# Patient Record
Sex: MALE | Race: WHITE | NOT HISPANIC OR LATINO | Employment: FULL TIME | URBAN - METROPOLITAN AREA
[De-identification: names, ages, dates, MRNs, and addresses within clinical notes are randomized per-mention and may not be internally consistent; named-entity substitution may affect disease eponyms.]

---

## 2017-09-06 PROBLEM — K62.5 RECTAL BLEEDING: Status: ACTIVE | Noted: 2017-09-06

## 2018-06-06 ENCOUNTER — TELEPHONE (OUTPATIENT)
Dept: FAMILY MEDICINE | Facility: CLINIC | Age: 43
End: 2018-06-06

## 2018-06-06 DIAGNOSIS — Z13.1 DIABETES MELLITUS SCREENING: ICD-10-CM

## 2018-06-06 DIAGNOSIS — Z13.29 THYROID DISORDER SCREEN: ICD-10-CM

## 2018-06-06 DIAGNOSIS — Z13.0 SCREENING FOR DEFICIENCY ANEMIA: Primary | ICD-10-CM

## 2018-06-06 DIAGNOSIS — Z13.220 SCREENING CHOLESTEROL LEVEL: ICD-10-CM

## 2018-06-06 NOTE — TELEPHONE ENCOUNTER
----- Message from Minna Vela sent at 6/6/2018 10:05 AM CDT -----  Contact: Self/ [phone]  New patient would like to establish care with you and would like a physical.    Please call and advise.

## 2018-06-06 NOTE — TELEPHONE ENCOUNTER
OK - advise patient that I like my patients to get their fasting labs for screening PRIOR to wellness visit so that way we have the results to review during the visit - schedule patient for fasting labs and NEW Patient WELLNESS APPT

## 2018-06-12 ENCOUNTER — LAB VISIT (OUTPATIENT)
Dept: LAB | Facility: HOSPITAL | Age: 43
End: 2018-06-12
Attending: NURSE PRACTITIONER
Payer: COMMERCIAL

## 2018-06-12 DIAGNOSIS — Z13.0 SCREENING FOR DEFICIENCY ANEMIA: ICD-10-CM

## 2018-06-12 DIAGNOSIS — Z13.1 DIABETES MELLITUS SCREENING: ICD-10-CM

## 2018-06-12 DIAGNOSIS — Z13.29 THYROID DISORDER SCREEN: ICD-10-CM

## 2018-06-12 DIAGNOSIS — Z13.220 SCREENING CHOLESTEROL LEVEL: ICD-10-CM

## 2018-06-12 LAB
ALBUMIN SERPL BCP-MCNC: 3.7 G/DL
ALP SERPL-CCNC: 67 U/L
ALT SERPL W/O P-5'-P-CCNC: 29 U/L
ANION GAP SERPL CALC-SCNC: 10 MMOL/L
AST SERPL-CCNC: 20 U/L
BASOPHILS # BLD AUTO: 0.04 K/UL
BASOPHILS NFR BLD: 0.7 %
BILIRUB SERPL-MCNC: 0.7 MG/DL
BUN SERPL-MCNC: 12 MG/DL
CALCIUM SERPL-MCNC: 9.3 MG/DL
CHLORIDE SERPL-SCNC: 105 MMOL/L
CHOLEST SERPL-MCNC: 134 MG/DL
CHOLEST/HDLC SERPL: 4.5 {RATIO}
CO2 SERPL-SCNC: 23 MMOL/L
CREAT SERPL-MCNC: 1 MG/DL
DIFFERENTIAL METHOD: ABNORMAL
EOSINOPHIL # BLD AUTO: 0.2 K/UL
EOSINOPHIL NFR BLD: 3.4 %
ERYTHROCYTE [DISTWIDTH] IN BLOOD BY AUTOMATED COUNT: 15.8 %
EST. GFR  (AFRICAN AMERICAN): >60 ML/MIN/1.73 M^2
EST. GFR  (NON AFRICAN AMERICAN): >60 ML/MIN/1.73 M^2
GLUCOSE SERPL-MCNC: 86 MG/DL
HCT VFR BLD AUTO: 43.3 %
HDLC SERPL-MCNC: 30 MG/DL
HDLC SERPL: 22.4 %
HGB BLD-MCNC: 12.8 G/DL
LDLC SERPL CALC-MCNC: 64.2 MG/DL
LYMPHOCYTES # BLD AUTO: 1.4 K/UL
LYMPHOCYTES NFR BLD: 25.1 %
MCH RBC QN AUTO: 24.2 PG
MCHC RBC AUTO-ENTMCNC: 29.6 G/DL
MCV RBC AUTO: 82 FL
MONOCYTES # BLD AUTO: 0.8 K/UL
MONOCYTES NFR BLD: 13.7 %
NEUTROPHILS # BLD AUTO: 3.2 K/UL
NEUTROPHILS NFR BLD: 56.2 %
NONHDLC SERPL-MCNC: 104 MG/DL
PLATELET # BLD AUTO: 173 K/UL
PMV BLD AUTO: 10.4 FL
POTASSIUM SERPL-SCNC: 4.3 MMOL/L
PROT SERPL-MCNC: 6.9 G/DL
RBC # BLD AUTO: 5.3 M/UL
SODIUM SERPL-SCNC: 138 MMOL/L
TRIGL SERPL-MCNC: 199 MG/DL
TSH SERPL DL<=0.005 MIU/L-ACNC: 1.98 UIU/ML
WBC # BLD AUTO: 5.62 K/UL

## 2018-06-12 PROCEDURE — 80061 LIPID PANEL: CPT

## 2018-06-12 PROCEDURE — 84443 ASSAY THYROID STIM HORMONE: CPT

## 2018-06-12 PROCEDURE — 36415 COLL VENOUS BLD VENIPUNCTURE: CPT

## 2018-06-12 PROCEDURE — 80053 COMPREHEN METABOLIC PANEL: CPT

## 2018-06-12 PROCEDURE — 85025 COMPLETE CBC W/AUTO DIFF WBC: CPT

## 2018-06-13 ENCOUNTER — OFFICE VISIT (OUTPATIENT)
Dept: FAMILY MEDICINE | Facility: CLINIC | Age: 43
End: 2018-06-13
Payer: COMMERCIAL

## 2018-06-13 VITALS
SYSTOLIC BLOOD PRESSURE: 128 MMHG | DIASTOLIC BLOOD PRESSURE: 86 MMHG | HEART RATE: 87 BPM | WEIGHT: 296.5 LBS | BODY MASS INDEX: 42.45 KG/M2 | TEMPERATURE: 99 F | HEIGHT: 70 IN | OXYGEN SATURATION: 97 %

## 2018-06-13 DIAGNOSIS — E78.1 HYPERTRIGLYCERIDEMIA: ICD-10-CM

## 2018-06-13 DIAGNOSIS — L81.9 HYPOPIGMENTED SKIN LESION: ICD-10-CM

## 2018-06-13 DIAGNOSIS — K62.5 RECTAL BLEEDING: ICD-10-CM

## 2018-06-13 DIAGNOSIS — Z86.010 HISTORY OF COLON POLYPS: ICD-10-CM

## 2018-06-13 DIAGNOSIS — Z00.00 ANNUAL PHYSICAL EXAM: Primary | ICD-10-CM

## 2018-06-13 DIAGNOSIS — K64.8 INTERNAL HEMORRHOID: ICD-10-CM

## 2018-06-13 DIAGNOSIS — Z23 NEED FOR TDAP VACCINATION: ICD-10-CM

## 2018-06-13 DIAGNOSIS — D64.9 MILD ANEMIA: ICD-10-CM

## 2018-06-13 DIAGNOSIS — E78.6 LOW HDL (UNDER 40): ICD-10-CM

## 2018-06-13 DIAGNOSIS — E66.01 MORBID OBESITY WITH BMI OF 40.0-44.9, ADULT: ICD-10-CM

## 2018-06-13 PROBLEM — K63.5 COLON POLYPS: Status: ACTIVE | Noted: 2017-01-01

## 2018-06-13 PROCEDURE — 99386 PREV VISIT NEW AGE 40-64: CPT | Mod: 25,S$GLB,, | Performed by: NURSE PRACTITIONER

## 2018-06-13 PROCEDURE — 99999 PR PBB SHADOW E&M-EST. PATIENT-LVL IV: CPT | Mod: PBBFAC,,, | Performed by: NURSE PRACTITIONER

## 2018-06-13 PROCEDURE — 90715 TDAP VACCINE 7 YRS/> IM: CPT | Mod: S$GLB,,, | Performed by: NURSE PRACTITIONER

## 2018-06-13 PROCEDURE — 3079F DIAST BP 80-89 MM HG: CPT | Mod: CPTII,S$GLB,, | Performed by: NURSE PRACTITIONER

## 2018-06-13 PROCEDURE — 3074F SYST BP LT 130 MM HG: CPT | Mod: CPTII,S$GLB,, | Performed by: NURSE PRACTITIONER

## 2018-06-13 PROCEDURE — 90471 IMMUNIZATION ADMIN: CPT | Mod: S$GLB,,, | Performed by: NURSE PRACTITIONER

## 2018-06-13 NOTE — PATIENT INSTRUCTIONS
"  Triglycerides  Does this test have other names?  Lipid panel, fasting lipoprotein panel  What is this test?  This test measures the amount of triglycerides in your blood.  Triglycerides are one of several types of fats in your blood. Other kinds are LDL ("bad") cholesterol and HDL ("good") cholesterol.  Knowing your triglyceride level is important, especially if you have diabetes, are overweight or a smoker, or are mostly inactive. High triglyceride levels may put you at greater risk for a heart attack or stroke.    This test is part of a group of cholesterol and blood fat tests called a fasting lipoprotein panel, or lipid panel. This panel is recommended for all adults at least once every 5 years, or as recommended by your healthcare provider.  Knowing your triglyceride level helps your healthcare provider suggest healthy changes to your diet or lifestyle. If you have triglycerides that are high to very high, your provider is more likely to prescribe medicines to lower your triglycerides or your LDL cholesterol.  Why do I need this test?  You may have this test as part of a routine checkup. You may also need this test if you're overweight, drink too much alcohol, rarely exercise, or have other conditions like high blood pressure or diabetes.  If you are on cholesterol-lowering medicines, you may have this test to see how well your treatment is working.  What other tests might I have along with this test?  Your healthcare provider will order screening tests for LDL, HDL, and total cholesterol.  What do my test results mean?  Many things may affect your lab test results. These include the method each lab uses to do the test. Even if your test results are different from the normal value, you may not have a problem. To learn what the results mean for you, talk with your healthcare provider.  Results are given in milligrams per deciliter (mg/dL). Normal levels of triglycerides are less than 150 mg/dL.  Here are how " higher numbers are classified:  · 150 to 199 mg/dL: Borderline high  · 200 to 499 mg/dL: High  · 500 mg/dL and above: Very high  If you have a high triglyceride level, you have a greater risk for heart attack and stroke.  A triglyceride level above 150 mg/dL also means that you may have an increased risk for metabolic syndrome. This is a cluster of symptoms including high blood pressure, high blood sugar, and high body fat around the waist. These symptoms have been linked to increased risk for diabetes, heart disease, and stroke.  High triglycerides levels can also be caused by certain diseases or inherited conditions.  If your triglyceride level is above 200 mg/dL, your healthcare provider may recommend that you:  · Lose weight  · Limit high-fat foods containing saturated fats. These are animal fats found in meat, butter, and whole milk.  · Limit trans fats, which are found in many processed foods like chips and store-bought cookies  · Cut back on drinks with added sugars, such as soda  · Limit your alcohol intake  · Stop smoking  · Control your blood pressure  · Exercise for at least 30 minutes a day, 5 days a week  · Limit the calories from fat in your diet to 25% to 35% of your total intake  If your triglycerides are extremely high--above 500 mg/dL--you may have an added risk for problems with your pancreas. You will likely need medicine to lower your levels along with recommended changes in diet and lifestyle.  How is this test done?  The test requires a blood sample, which is drawn through a needle from a vein in your arm.  Does this test pose any risks?  Taking a blood sample with a needle carries risks that include bleeding, infection, bruising, or feeling dizzy. When the needle pricks your arm, you may feel a slight stinging sensation or pain. Afterward, the site may be slightly sore.  What might affect my test results?  Not fasting for the required length of time before the test can affect your results.  Certain medicines can affect your results, as can drinking alcohol.  How do I prepare for the test?  If you have this test as part of a cholesterol screening, you will need to not eat or drink anything but water for 9 to 14 hours before the test. In addition, be sure your healthcare provider knows about all medicines, herbs, vitamins, and supplements you are taking. This includes medicines that don't need a prescription and any illicit drugs you may use.     © 7383-5493 [employer]. [address] [place] [address], [place] [address]. All rights reserved. This information is not intended as a substitute for professional medical care. Always follow your healthcare professional's instructions.

## 2018-06-14 NOTE — PROGRESS NOTES
"Subjective:       Patient ID: Kyle Abel is a 43 y.o. male.    Chief Complaint: Annual Exam (wellness)    ####NEW PATIENT - 3 years since last visit###    Patient is a 43 year old white male with history of Hypertension that resolved with lifestyle modifications, history of rectal bleeding with internal hemorrhoids and colon polyps seen on colonoscopy that is here today to establish care and get Biometric Screening form completed.    Patient reports that he was diagnosed with Hypertension at one visit in the past because blood pressure was elevated but reports he was able to resolve with lifestyle modifications.  Blood pressure is okay today.  /86 (BP Location: Left arm, Patient Position: Sitting, BP Method: Large (Manual))   Pulse 87   Temp 99.4 °F (37.4 °C) (Oral)   Ht 5' 10" (1.778 m)   Wt 134.5 kg (296 lb 8.3 oz)   SpO2 97%   BMI 42.55 kg/m²     Patient reports he still has excessive rectal bleeding and known internal hemorrhoids.  He reports he seen GI and had colonoscopy screening but then the GI never addressed the continued rectal bleeding.  Patient does show a very mild microcytic anemia on wellness labs.  Will refer to Colorectal Surgery for evaluation.    Wellness labs:  -  CBC shows mild microcytic anemia present.  Does report rectal bleeding.  Will refer to CRS and advised to take MVI with iron supplement.  -  CMP WNL  -  Total cholesterol normal at 134 with normal LDL 64.2.  Patient does have elevated triglycerides of 199 with low HDL 30.  Patient did quit smoking 3 days ago.  Advised on lifestyle modifications and fish oil supplement.  -  TSH WNL    Health Maintenance:  -  Due for Tdap vaccine.        Lab Visit on 06/12/2018   Component Date Value Ref Range Status    WBC 06/12/2018 5.62  3.90 - 12.70 K/uL Final    RBC 06/12/2018 5.30  4.60 - 6.20 M/uL Final    Hemoglobin 06/12/2018 12.8* 14.0 - 18.0 g/dL Final    Hematocrit 06/12/2018 43.3  40.0 - 54.0 % Final    MCV " 06/12/2018 82  82 - 98 fL Final    MCH 06/12/2018 24.2* 27.0 - 31.0 pg Final    MCHC 06/12/2018 29.6* 32.0 - 36.0 g/dL Final    RDW 06/12/2018 15.8* 11.5 - 14.5 % Final    Platelets 06/12/2018 173  150 - 350 K/uL Final    MPV 06/12/2018 10.4  9.2 - 12.9 fL Final    Gran # (ANC) 06/12/2018 3.2  1.8 - 7.7 K/uL Final    Lymph # 06/12/2018 1.4  1.0 - 4.8 K/uL Final    Mono # 06/12/2018 0.8  0.3 - 1.0 K/uL Final    Eos # 06/12/2018 0.2  0.0 - 0.5 K/uL Final    Baso # 06/12/2018 0.04  0.00 - 0.20 K/uL Final    Gran% 06/12/2018 56.2  38.0 - 73.0 % Final    Lymph% 06/12/2018 25.1  18.0 - 48.0 % Final    Mono% 06/12/2018 13.7  4.0 - 15.0 % Final    Eosinophil% 06/12/2018 3.4  0.0 - 8.0 % Final    Basophil% 06/12/2018 0.7  0.0 - 1.9 % Final    Differential Method 06/12/2018 Automated   Final    Sodium 06/12/2018 138  136 - 145 mmol/L Final    Potassium 06/12/2018 4.3  3.5 - 5.1 mmol/L Final    Chloride 06/12/2018 105  95 - 110 mmol/L Final    CO2 06/12/2018 23  23 - 29 mmol/L Final    Glucose 06/12/2018 86  70 - 110 mg/dL Final    BUN, Bld 06/12/2018 12  6 - 20 mg/dL Final    Creatinine 06/12/2018 1.0  0.5 - 1.4 mg/dL Final    Calcium 06/12/2018 9.3  8.7 - 10.5 mg/dL Final    Total Protein 06/12/2018 6.9  6.0 - 8.4 g/dL Final    Albumin 06/12/2018 3.7  3.5 - 5.2 g/dL Final    Total Bilirubin 06/12/2018 0.7  0.1 - 1.0 mg/dL Final    Comment: For infants and newborns, interpretation of results should be based  on gestational age, weight and in agreement with clinical  observations.  Premature Infant recommended reference ranges:  Up to 24 hours.............<8.0 mg/dL  Up to 48 hours............<12.0 mg/dL  3-5 days..................<15.0 mg/dL  6-29 days.................<15.0 mg/dL      Alkaline Phosphatase 06/12/2018 67  55 - 135 U/L Final    AST 06/12/2018 20  10 - 40 U/L Final    ALT 06/12/2018 29  10 - 44 U/L Final    Anion Gap 06/12/2018 10  8 - 16 mmol/L Final    eGFR if   06/12/2018 >60  >60 mL/min/1.73 m^2 Final    eGFR if non African American 06/12/2018 >60  >60 mL/min/1.73 m^2 Final    Comment: Calculation used to obtain the estimated glomerular filtration  rate (eGFR) is the CKD-EPI equation.       Cholesterol 06/12/2018 134  120 - 199 mg/dL Final    Comment: The National Cholesterol Education Program (NCEP) has set the  following guidelines (reference ranges) for Cholesterol:  Optimal.....................<200 mg/dL  Borderline High.............200-239 mg/dL  High........................> or = 240 mg/dL      Triglycerides 06/12/2018 199* 30 - 150 mg/dL Final    Comment: The National Cholesterol Education Program (NCEP) has set the  following guidelines (reference values) for triglycerides:  Normal......................<150 mg/dL  Borderline High.............150-199 mg/dL  High........................200-499 mg/dL      HDL 06/12/2018 30* 40 - 75 mg/dL Final    Comment: The National Cholesterol Education Program (NCEP) has set the  following guidelines (reference values) for HDL Cholesterol:  Low...............<40 mg/dL  Optimal...........>60 mg/dL      LDL Cholesterol 06/12/2018 64.2  63.0 - 159.0 mg/dL Final    Comment: The National Cholesterol Education Program (NCEP) has set the  following guidelines (reference values) for LDL Cholesterol:  Optimal.......................<130 mg/dL  Borderline High...............130-159 mg/dL  High..........................160-189 mg/dL  Very High.....................>190 mg/dL      HDL/Chol Ratio 06/12/2018 22.4  20.0 - 50.0 % Final    Total Cholesterol/HDL Ratio 06/12/2018 4.5  2.0 - 5.0 Final    Non-HDL Cholesterol 06/12/2018 104  mg/dL Final    Comment: Risk category and Non-HDL cholesterol goals:  Coronary heart disease (CHD)or equivalent (10-year risk of CHD >20%):  Non-HDL cholesterol goal     <130 mg/dL  Two or more CHD risk factors and 10-year risk of CHD <= 20%:  Non-HDL cholesterol goal     <160 mg/dL  0 to 1 CHD risk  factor:  Non-HDL cholesterol goal     <190 mg/dL      TSH 06/12/2018 1.978  0.400 - 4.000 uIU/mL Final       Previous Medications    No medications on file       Past Medical History:   Diagnosis Date    Colon polyps 2017    repeat colonoscopy in 5 years    Hemorrhoid     Hypertension     Diagnosed @ 1 1/2 years ago and medication was prescribed - patient reports he did not take medication and does not want treatment for blood pressure    Internal hemorrhoid, bleeding     Rectal bleed        Past Surgical History:   Procedure Laterality Date    COLONOSCOPY N/A 9/6/2017    Procedure: COLONOSCOPY;  Surgeon: Chapin Hernandez MD;  [facility];  Service: Endoscopy;  Laterality: N/A;       Family History   Problem Relation Age of Onset    No Known Problems Mother     Ulcerative colitis Father 57        intestines     No Known Problems Brother        Social History     Social History    Marital status:      Spouse name: N/A    Number of children: N/A    Years of education: N/A     Occupational History     [employer]      Social History Main Topics    Smoking status: Former Smoker     Packs/day: 1.50     Years: 25.00     Types: Cigarettes     Quit date: 6/10/2018    Smokeless tobacco: Current User     Types: Chew    Alcohol use Yes      Comment: every weekend - 4 drinks either beer or vodka per occasion    Drug use: No    Sexual activity: Not Asked     Other Topics Concern    None     Social History Narrative    None       Review of Systems   Constitutional: Negative for activity change, appetite change, fatigue, fever and unexpected weight change.        Obesity   HENT: Negative for congestion, ear pain, mouth sores, nosebleeds, postnasal drip, rhinorrhea, sinus pressure, sneezing, sore throat, trouble swallowing and voice change.    Eyes: Negative.    Respiratory: Negative for cough, chest tightness and shortness of breath.    Cardiovascular: Negative for  "chest pain, palpitations and leg swelling.   Gastrointestinal: Positive for anal bleeding, blood in stool and rectal pain. Negative for abdominal pain, constipation, diarrhea, nausea and vomiting.   Endocrine: Negative.    Genitourinary: Negative for difficulty urinating, dysuria, flank pain, hematuria and urgency.   Musculoskeletal: Negative for arthralgias, back pain, gait problem, joint swelling, myalgias and neck pain.   Skin: Negative for color change, rash and wound.        Skin lesion on back that he would like checked.   Allergic/Immunologic: Negative for immunocompromised state.   Neurological: Negative for dizziness, tremors, seizures, syncope, speech difficulty and headaches.   Hematological: Negative for adenopathy. Does not bruise/bleed easily.   Psychiatric/Behavioral: Negative for behavioral problems, dysphoric mood, sleep disturbance and suicidal ideas. The patient is not nervous/anxious.          Objective:     Vitals:    06/13/18 1445   BP: 128/86   BP Location: Left arm   Patient Position: Sitting   BP Method: Large (Manual)   Pulse: 87   Temp: 99.4 °F (37.4 °C)   TempSrc: Oral   SpO2: 97%   Weight: 134.5 kg (296 lb 8.3 oz)   Height: 5' 10" (1.778 m)          Physical Exam   Constitutional: He is oriented to person, place, and time. He appears well-developed and well-nourished.   + morbid obesity with Body mass index is 42.55 kg/m².     HENT:   Head: Normocephalic.   Right Ear: External ear normal.   Left Ear: External ear normal.   Nose: Nose normal.   Mouth/Throat: Oropharynx is clear and moist. No oropharyngeal exudate.   Eyes: EOM are normal. Pupils are equal, round, and reactive to light. Right eye exhibits no discharge. Left eye exhibits no discharge. No scleral icterus.   Neck: Normal range of motion. Neck supple. No tracheal deviation present. No thyromegaly present.   Cardiovascular: Normal rate, regular rhythm and normal heart sounds.    No murmur heard.  Pulmonary/Chest: Effort normal " and breath sounds normal. No respiratory distress.   Abdominal: Soft. He exhibits no distension.   Musculoskeletal: Normal range of motion. He exhibits no edema.   Lymphadenopathy:     He has no cervical adenopathy.   Neurological: He is alert and oriented to person, place, and time. Coordination normal.   Skin: Skin is warm and dry. No rash noted.        Patient has a hypopigmented, raised lesion to the back that is round with normal borders and < 1 cm - not highly suspicious.  However patient has multiple scarring to bilateral arms that patient reports he has his own cautery machine that he uses to remove skin lesions - his description of lesions sounds like acetic keratosis.  Recommended patient see dermatologist for skin check.   Psychiatric: He has a normal mood and affect. His behavior is normal.         Assessment:         ICD-10-CM ICD-9-CM   1. Annual physical exam Z00.00 V70.0   2. Mild anemia D64.9 285.9   3. Rectal bleeding K62.5 569.3   4. Internal hemorrhoid K64.8 455.0   5. History of colon polyps Z86.010 V12.72   6. Hypertriglyceridemia E78.1 272.1   7. Low HDL (under 40) E78.6 272.5   8. Need for Tdap vaccination Z23 V06.1   9. Hypopigmented skin lesion L81.9 709.00   10. Morbid obesity with BMI of 40.0-44.9, adult E66.01 278.01    Z68.41 V85.41       Plan:       Annual physical exam  - due for Tdap vaccine    Mild anemia  -  Suspect due to the rectal bleeding that patient describes.  Advised to take OTC MVI with iron and will refer to CRS  -     Ambulatory Referral to Colorectal Surgery    Rectal bleeding  -     Ambulatory Referral to Colorectal Surgery    Internal hemorrhoid  -     Ambulatory Referral to Colorectal Surgery    History of colon polyps  -     Ambulatory Referral to Colorectal Surgery    Hypertriglyceridemia  -  Advised on lifestyle modifications with multiple handouts given    Low HDL (under 40)  -  Advised on lifestyle modifications and multiple handouts given.  Also recommended  fish oil supplement.    Need for Tdap vaccination  -     Tdap Vaccine    Hypopigmented skin lesion  -  Advised patient that skin lesion on back is not highly suspicious BUT the lesions he describes that he is removed himself by cautery sounds suspicious of precancerous lesions - recommended patient see dermatology for skin check.    Morbid obesity with BMI of 40.0-44.9, adult  -  Advised on lifestyle modifications.      Follow-up in about 1 year (around 6/13/2019).     Patient's Medications   New Prescriptions    No medications on file   Previous Medications    No medications on file   Modified Medications    No medications on file   Discontinued Medications    HYDROCORTISONE-PRAMOXINE (PROCTOFOAM-HS) RECTAL FOAM    Place 1 applicator rectally 2 (two) times daily.

## 2018-06-18 NOTE — PROGRESS NOTES
CRS Office Visit History and Physical    [place]erring [place]  [address] [facility]  [address]  [place] [address]    SUBJECTIVE:     Chief Complaint: internal hemorrhoids    History of Present Illness:  Patient is a 43 y.o. male presents with internal hemorrhoids. The patient is a new patient to this practice.   Course is as follows:  He has suffered from internal and external hemorrhoids for multiple years.  His external hemorrhoids of decreased in size over the past few years which she attributes to a topical remedy.  He still has frequent bleeding with bowel movements.  This is associated with passage of clots.  He has intermittent low dull pain when bleeding. No significant perianal mass or protrusion.  No prolapse of tissue. His bowel movements are regular and he does have intermittent constipation.  He tries to moderate this with dietary intake of fiber.  No family history of colon or rectal cancer.  No prior anorectal procedures.        Last Colonoscopy: 2017    - One 8 mm polyp in the ascending colon, removed                         with a hot snare. Resected and retrieved. --> pathology was adenoma                        - One 4 mm polyp in the sigmoid colon, removed with                         a cold snare. Resected and retrieved.  --> hyperplastic                        - Internal hemorrhoids.                        - The examination was otherwise normal.    Review of patient's allergies indicates:   Allergen Reactions    Penicillins Swelling     As a child age 11       Past Medical History:   Diagnosis Date    Colon polyps 2017    repeat colonoscopy in 5 years    Hemorrhoid     Hypertension     Diagnosed @ 1 1/2 years ago and medication was prescribed - patient reports he did not take medication and does not want treatment for blood pressure    Internal hemorrhoid, bleeding     Rectal bleed      Past Surgical History:   Procedure Laterality Date    COLONOSCOPY N/A 9/6/2017     "Procedure: COLONOSCOPY;  Surgeon: Chapin Hernandez MD;  Location: [facility];  Service: Endoscopy;  Laterality: N/A;     Family History   Problem Relation Age of Onset    No Known Problems Mother     Ulcerative colitis Father 57        intestines     No Known Problems Brother      Social History   Substance Use Topics    Smoking status: Former Smoker     Packs/day: 1.50     Years: 25.00     Types: Cigarettes     Quit date: 6/10/2018    Smokeless tobacco: Current User     Types: Chew    Alcohol use Yes      Comment: every weekend - 4 drinks either beer or vodka per occasion        Review of Systems:  Review of Systems   Constitutional: Negative for chills, diaphoresis, fever, malaise/fatigue and weight loss.   HENT: Negative for congestion.    Respiratory: Negative for shortness of breath.    Cardiovascular: Negative for chest pain and leg swelling.   Gastrointestinal: Positive for blood in stool and constipation. Negative for abdominal pain, nausea and vomiting.   Genitourinary: Negative for dysuria.   Musculoskeletal: Negative for back pain and myalgias.   Skin: Negative for rash.   Neurological: Negative for dizziness and weakness.   Endo/Heme/Allergies: Does not bruise/bleed easily.   Psychiatric/Behavioral: Negative for depression.       OBJECTIVE:     Vital Signs (Most Recent)  /84 (BP Location: Left arm, Patient Position: Sitting, BP Method: Large (Manual))   Pulse 86   Ht 5' 10" (1.778 m)   Wt 135.6 kg (299 lb 0.9 oz)   SpO2 97%   BMI 42.91 kg/m²     Physical Exam:  General: White male in no distress   Neuro: alert and oriented x 4.  Moves all extremities.     HEENT: no icterus.  Trachea midline  Respiratory: respirations are even and unlabored  Cardiac: regular rate  Abdomen:  Soft, nontender, no masses  Extremities: Warm dry and intact  Skin: no rashes  Anorectal:  External exam reveals very small external skin tags.  Digital exam was performed. A moderate size hemorrhoids felt on the " right posterior.  Otherwise perianal tone is normal and the exam was normal.  Anoscopy was then performed. Grade 2-3 right posterior hemorrhoid.  Grade 1 right anterior hemorrhoid.  Grade 1-2 left lateral hemorrhoid    Labs: H/H = 13/43    Imaging: none      ASSESSMENT/PLAN:     Kyle was seen today for hemorrhoids and rectal bleeding.    Diagnoses and all orders for this visit:    Internal hemorrhoid, bleeding    Other orders  -     traMADol (ULTRAM) 50 mg tablet; Take 1 tablet (50 mg total) by mouth every 6 (six) hours as needed for Pain.        43-year-old gentleman with rectal bleeding associated to a large posterior right hemorrhoid and smaller left lateral hemorrhoid.  Options for treatment were discussed with him today including dietary modification versus banding versus excisional hemorrhoidectomy.  Banding was recommended as the disease is mostly internal.  Banding was performed in the left lateral and right posterior location.  I will plan to see him back in 4 weeks.    Rubber Band Ligation:  Verbal consent was obtained.   Clear plastic anoscope inserted.    Grade II/III hemorrhoids seen on the right posterior position and grade I/II in the left lateral position  Suction applicator was placed above the dentate line.   Rubber bands were deployed in the right posterior and left lateral position.    Patient tolerated the procedure well.       CECILIA Lawrence MD  Staff Surgeon  Colon & Rectal Surgery

## 2018-06-19 ENCOUNTER — INITIAL CONSULT (OUTPATIENT)
Dept: SURGERY | Facility: CLINIC | Age: 43
End: 2018-06-19
Payer: COMMERCIAL

## 2018-06-19 VITALS
DIASTOLIC BLOOD PRESSURE: 84 MMHG | HEIGHT: 70 IN | HEART RATE: 86 BPM | OXYGEN SATURATION: 97 % | WEIGHT: 299.06 LBS | SYSTOLIC BLOOD PRESSURE: 130 MMHG | BODY MASS INDEX: 42.81 KG/M2

## 2018-06-19 DIAGNOSIS — K64.8 INTERNAL HEMORRHOID, BLEEDING: Primary | ICD-10-CM

## 2018-06-19 PROCEDURE — 3075F SYST BP GE 130 - 139MM HG: CPT | Mod: CPTII,S$GLB,, | Performed by: COLON & RECTAL SURGERY

## 2018-06-19 PROCEDURE — 99203 OFFICE O/P NEW LOW 30 MIN: CPT | Mod: 25,S$GLB,, | Performed by: COLON & RECTAL SURGERY

## 2018-06-19 PROCEDURE — 46221 LIGATION OF HEMORRHOID(S): CPT | Mod: S$GLB,,, | Performed by: COLON & RECTAL SURGERY

## 2018-06-19 PROCEDURE — 3008F BODY MASS INDEX DOCD: CPT | Mod: CPTII,S$GLB,, | Performed by: COLON & RECTAL SURGERY

## 2018-06-19 PROCEDURE — 3079F DIAST BP 80-89 MM HG: CPT | Mod: CPTII,S$GLB,, | Performed by: COLON & RECTAL SURGERY

## 2018-06-19 PROCEDURE — 99999 PR PBB SHADOW E&M-EST. PATIENT-LVL III: CPT | Mod: PBBFAC,,, | Performed by: COLON & RECTAL SURGERY

## 2018-06-19 RX ORDER — TRAMADOL HYDROCHLORIDE 50 MG/1
50 TABLET ORAL EVERY 6 HOURS PRN
Qty: 30 TABLET | Refills: 0 | Status: SHIPPED | OUTPATIENT
Start: 2018-06-19 | End: 2018-06-29

## 2018-06-19 NOTE — LETTER
June 19, 2018      [place]  [address] [facility]  [address]  [place] [address]           [facility]  [address]  [place] [address]  Phone: [phone]  Fax: [phone]          Patient: Kyle Abel   MR Number: [id number]   YOB: 1975   Date of Visit: 6/19/2018       Dear Gilma Cuba:    Thank you for referring Kyle Abel to me for evaluation. Attached you will find relevant portions of my assessment and plan of care.    If you have questions, please do not hesitate to call me. I look forward to following Kyle Abel along with you.    Sincerely,    Bal Lawrence MD    Enclosure  CC:  No Recipients    If you would like to receive this communication electronically, please contact [email or web address] or [phone] to request more information on [facility] access.    For providers and/or their staff who would like to refer a patient to [facility], please contact us through our one-stop-shop provider referral line, [facility], at [phone].    If you feel you have received this communication in error or would no longer like to receive these types of communications, please e-mail [email or web address]

## 2018-06-20 ENCOUNTER — TELEPHONE (OUTPATIENT)
Dept: SURGERY | Facility: CLINIC | Age: 43
End: 2018-06-20

## 2018-06-20 ENCOUNTER — NURSE TRIAGE (OUTPATIENT)
Dept: ADMINISTRATIVE | Facility: CLINIC | Age: 43
End: 2018-06-20

## 2018-06-20 NOTE — TELEPHONE ENCOUNTER
----- Message from Connie Cagle sent at 6/20/2018  9:01 AM CDT -----  Contact: Pt:[phone]  .Needs Advice    Reason for call:Pt called and states he came in to see  06/19/18 and he is experiencing lower back pain and leg pain.Pt would like to speak with the nurse.      Communication Preference:Pt:[phone]  Additional Information:

## 2018-06-20 NOTE — TELEPHONE ENCOUNTER
Pt says the Tramadol did not help last night but today he took Naproxin which did help. The pain is leg and low back pain. Told him this is not related to the RBL. If it continues tomorrow to call his primary doctor.

## 2018-06-20 NOTE — TELEPHONE ENCOUNTER
Reason for Disposition   [1] MILD-MODERATE post-op pain (e.g., pain scale 1-7) AND [2] not controlled with pain medications    Protocols used: ST POST-OP SYMPTOMS AND [id number]    Kyle is calling to report that since procedure he is having lower back pain and pain in left leg.  States Tramadol does not help this pain.  Pain is a 7.  Please contact Kyle to advise this AM.  Kyle can be reached at [phone]

## 2018-10-29 ENCOUNTER — TELEPHONE (OUTPATIENT)
Dept: FAMILY MEDICINE | Facility: CLINIC | Age: 43
End: 2018-10-29

## 2018-10-29 NOTE — TELEPHONE ENCOUNTER
Spoke with pt has an ear infection don't have the time to come in could you call something in ,Gilma called something in before for me, if not to call him and will try to get in.

## 2018-10-29 NOTE — TELEPHONE ENCOUNTER
Call patient and dvise patient that I can not call in med to treat symptoms that I have not evaluated.  An ear infection can be outer ear, middle ear, or inner ear infection - treat all 3 of these conditions differently so  and  must evaluate the ear to know how to treat.

## 2018-10-29 NOTE — TELEPHONE ENCOUNTER
----- Message from Yasmeen Draper sent at 10/29/2018 12:01 PM CDT -----  Contact: [phone]/ self   Patient called in requesting to speak with you. Patient prefers to speak with a nurse. Please advise.

## 2019-01-03 ENCOUNTER — TELEPHONE (OUTPATIENT)
Dept: FAMILY MEDICINE | Facility: CLINIC | Age: 44
End: 2019-01-03

## 2019-01-03 ENCOUNTER — OFFICE VISIT (OUTPATIENT)
Dept: FAMILY MEDICINE | Facility: CLINIC | Age: 44
End: 2019-01-03
Payer: COMMERCIAL

## 2019-01-03 VITALS
HEIGHT: 70 IN | BODY MASS INDEX: 41.95 KG/M2 | HEART RATE: 82 BPM | SYSTOLIC BLOOD PRESSURE: 124 MMHG | OXYGEN SATURATION: 96 % | TEMPERATURE: 99 F | WEIGHT: 293 LBS | DIASTOLIC BLOOD PRESSURE: 80 MMHG

## 2019-01-03 DIAGNOSIS — J30.9 ALLERGIC RHINITIS, UNSPECIFIED SEASONALITY, UNSPECIFIED TRIGGER: Primary | ICD-10-CM

## 2019-01-03 DIAGNOSIS — L81.9 HYPOPIGMENTED SKIN LESION: ICD-10-CM

## 2019-01-03 DIAGNOSIS — Z87.19 HISTORY OF HEMORRHOIDS: ICD-10-CM

## 2019-01-03 DIAGNOSIS — K62.5 RECTAL BLEEDING: ICD-10-CM

## 2019-01-03 PROCEDURE — 3079F PR MOST RECENT DIASTOLIC BLOOD PRESSURE 80-89 MM HG: ICD-10-PCS | Mod: CPTII,S$GLB,, | Performed by: NURSE PRACTITIONER

## 2019-01-03 PROCEDURE — 99999 PR PBB SHADOW E&M-EST. PATIENT-LVL III: CPT | Mod: PBBFAC,,, | Performed by: NURSE PRACTITIONER

## 2019-01-03 PROCEDURE — 3074F SYST BP LT 130 MM HG: CPT | Mod: CPTII,S$GLB,, | Performed by: NURSE PRACTITIONER

## 2019-01-03 PROCEDURE — 99214 PR OFFICE/OUTPT VISIT, EST, LEVL IV, 30-39 MIN: ICD-10-PCS | Mod: S$GLB,,, | Performed by: NURSE PRACTITIONER

## 2019-01-03 PROCEDURE — 99214 OFFICE O/P EST MOD 30 MIN: CPT | Mod: S$GLB,,, | Performed by: NURSE PRACTITIONER

## 2019-01-03 PROCEDURE — 3074F PR MOST RECENT SYSTOLIC BLOOD PRESSURE < 130 MM HG: ICD-10-PCS | Mod: CPTII,S$GLB,, | Performed by: NURSE PRACTITIONER

## 2019-01-03 PROCEDURE — 3008F BODY MASS INDEX DOCD: CPT | Mod: CPTII,S$GLB,, | Performed by: NURSE PRACTITIONER

## 2019-01-03 PROCEDURE — 99999 PR PBB SHADOW E&M-EST. PATIENT-LVL III: ICD-10-PCS | Mod: PBBFAC,,, | Performed by: NURSE PRACTITIONER

## 2019-01-03 PROCEDURE — 3008F PR BODY MASS INDEX (BMI) DOCUMENTED: ICD-10-PCS | Mod: CPTII,S$GLB,, | Performed by: NURSE PRACTITIONER

## 2019-01-03 PROCEDURE — 3079F DIAST BP 80-89 MM HG: CPT | Mod: CPTII,S$GLB,, | Performed by: NURSE PRACTITIONER

## 2019-01-03 RX ORDER — CETIRIZINE HYDROCHLORIDE 10 MG/1
10 TABLET ORAL DAILY
Refills: 0 | COMMUNITY
Start: 2019-01-03 | End: 2019-01-15

## 2019-01-03 NOTE — PROGRESS NOTES
Subjective:       Patient ID: Kyle Abel is a 43 y.o. male.    Chief Complaint: Otalgia and Sinus Problem    44 y/o male present to clinic with ear ache, sinus problems, and rectal bleeding.  This patient is new to me, his PCP is Gilma Cuba NP. Ear ache and sinus problems began about 5 days ago. He has not tried any medication for symptom relief. He believes he has an ear infection as he has a hx of chronic ear infections.    Patient has noticed rectal bleeding with bowel movements for the past month. Previous rubber band ligation procedure for internal hemorrhoids with Dr. Lawrence in June 2018. He also notices small blood clots with BM.   He has intermittent low dull pain when bleeding. No significant perianal mass or protrusion.  No prolapse of tissue. His bowel movements are regular and he does have intermittent constipation.  He tries to moderate this with dietary intake of fiber.  No family history of colon or rectal cancer.      Otalgia    There is pain in both ears. This is a new problem. The current episode started in the past 7 days (5 days ago). There has been no fever. The pain is at a severity of 5/10. The pain is moderate. Associated symptoms include coughing, headaches and rhinorrhea. Pertinent negatives include no abdominal pain, diarrhea, ear discharge, hearing loss, neck pain, sore throat or vomiting. He has tried nothing for the symptoms. His past medical history is significant for a chronic ear infection.   Sinus Problem   This is a new problem. The current episode started in the past 7 days (5 days ago). The problem has been gradually worsening since onset. There has been no fever. His pain is at a severity of 5/10. The pain is moderate. Associated symptoms include congestion, coughing, ear pain, headaches, sinus pressure and sneezing. Pertinent negatives include no chills, neck pain, shortness of breath, sore throat or swollen glands. Past treatments include nothing.       Current  Outpatient Medications   Medication Sig Dispense Refill    cetirizine (ZYRTEC) 10 MG tablet Take 1 tablet (10 mg total) by mouth once daily.  0     No current facility-administered medications for this visit.        Past Medical History:   Diagnosis Date    Colon polyps 2017    repeat colonoscopy in 5 years    Hemorrhoid     Hypertension     Diagnosed @ 1 1/2 years ago and medication was prescribed - patient reports he did not take medication and does not want treatment for blood pressure    Internal hemorrhoid, bleeding     Rectal bleed        Past Surgical History:   Procedure Laterality Date    COLONOSCOPY N/A 2017    Performed by Chapin Hernandez MD at [facility]       Family History   Problem Relation Age of Onset    No Known Problems Mother     Ulcerative colitis Father 57        intestines     No Known Problems Brother        Social History     Socioeconomic History    Marital status:      Spouse name: None    Number of children: None    Years of education: None    Highest education level: None   Social Needs    Financial resource strain: None    Food insecurity - worry: None    Food insecurity - inability: None    Transportation needs - medical: None    Transportation needs - non-medical: None   Occupational History    Occupation:      Employer: [employer]    Tobacco Use    Smoking status: Former Smoker     Packs/day: 1.50     Years: 25.00     Pack years: 37.50     Types: Cigarettes     Last attempt to quit: 6/10/2018     Years since quittin.5    Smokeless tobacco: Current User     Types: Chew   Substance and Sexual Activity    Alcohol use: Yes     Comment: every weekend - 4 drinks either beer or vodka per occasion    Drug use: No    Sexual activity: None   Other Topics Concern    None   Social History Narrative    None       Review of Systems   Constitutional: Negative for chills, fatigue and fever.   HENT: Positive for congestion, ear  "pain, postnasal drip, rhinorrhea, sinus pressure, sinus pain and sneezing. Negative for ear discharge, hearing loss, sore throat and trouble swallowing.    Eyes: Positive for discharge (watery eyes). Negative for pain, redness and itching.   Respiratory: Positive for cough. Negative for shortness of breath and wheezing.    Cardiovascular: Negative for chest pain.   Gastrointestinal: Positive for anal bleeding, blood in stool, constipation and rectal pain. Negative for abdominal pain, diarrhea, nausea and vomiting.   Genitourinary: Negative for dysuria.   Musculoskeletal: Negative for myalgias and neck pain.   Allergic/Immunologic: Positive for environmental allergies.   Neurological: Positive for headaches. Negative for syncope and weakness.   Hematological: Does not bruise/bleed easily.         Objective:     Vitals:    01/03/19 1306   BP: 124/80   BP Location: Right arm   Patient Position: Sitting   BP Method: Large (Manual)   Pulse: 82   Temp: 98.6 °F (37 °C)   TempSrc: Oral   SpO2: 96%   Weight: 132.9 kg (292 lb 15.9 oz)   Height: 5' 10" (1.778 m)          Physical Exam   Constitutional: He is oriented to person, place, and time. He appears well-developed and well-nourished.   HENT:   Head: Normocephalic.   Right Ear: Hearing normal. Tympanic membrane is not erythematous. A middle ear effusion (clear fluid) is present.   Left Ear: Hearing and tympanic membrane normal. Tympanic membrane is not erythematous.   Nose: Mucosal edema and rhinorrhea present. Right sinus exhibits maxillary sinus tenderness. Right sinus exhibits no frontal sinus tenderness. Left sinus exhibits maxillary sinus tenderness. Left sinus exhibits no frontal sinus tenderness.   Mouth/Throat: Uvula is midline and mucous membranes are normal. Posterior oropharyngeal erythema (+pnd) present. No oropharyngeal exudate or posterior oropharyngeal edema.   Eyes: Conjunctivae and EOM are normal. Pupils are equal, round, and reactive to light.   Neck: " Normal range of motion. No thyromegaly present.   Cardiovascular: Normal rate and regular rhythm.   Pulmonary/Chest: Effort normal and breath sounds normal. He has no wheezes.   Abdominal: Soft. Bowel sounds are normal. There is no tenderness.   Musculoskeletal: Normal range of motion.   Lymphadenopathy:     He has no cervical adenopathy.   Neurological: He is alert and oriented to person, place, and time.   Skin: Skin is warm and dry.        Psychiatric: He has a normal mood and affect.         Assessment:         ICD-10-CM ICD-9-CM   1. Allergic rhinitis, unspecified seasonality, unspecified trigger J30.9 477.9   2. Rectal bleeding K62.5 569.3   3. History of hemorrhoids Z87.19 V13.89   4. Hypopigmented skin lesion L81.9 709.00       Plan:       Allergic rhinitis, unspecified seasonality, unspecified trigger  -     cetirizine (ZYRTEC) 10 MG tablet; Take 1 tablet (10 mg total) by mouth once daily.; Refill: 0  -continue nasal Flonase 1-2 sprays to each nostril daily    Rectal bleeding  -     Ambulatory Referral to Gastroenterology    History of hemorrhoids  -     Ambulatory Referral to Gastroenterology    Hypopigmented skin lesion  -Encouraged to see dermatology is lesion changes in size, color, or becomes painful or irritated.    Follow-up if symptoms worsen or fail to improve.        Medication List           Accurate as of 1/3/19  2:07 PM. If you have any questions, ask your nurse or doctor.               START taking these medications    cetirizine 10 MG tablet  Commonly known as:  ZYRTEC  Take 1 tablet (10 mg total) by mouth once daily.  Started by:  JOSEP Mendoza           Where to Get Your Medications      You can get these medications from any pharmacy    You don't need a prescription for these medications  · cetirizine 10 MG tablet

## 2019-01-03 NOTE — TELEPHONE ENCOUNTER
----- Message from Regine Willingham sent at 1/3/2019  8:56 AM CST -----  Contact: [phone]/ self   Pt its requesting an appointment for today , states she has double ear infection . Please advise

## 2019-01-04 ENCOUNTER — TELEPHONE (OUTPATIENT)
Dept: FAMILY MEDICINE | Facility: CLINIC | Age: 44
End: 2019-01-04

## 2019-01-04 DIAGNOSIS — K62.5 RECTAL BLEEDING: Primary | ICD-10-CM

## 2019-01-04 DIAGNOSIS — K64.8 INTERNAL HEMORRHOID, BLEEDING: ICD-10-CM

## 2019-01-04 NOTE — TELEPHONE ENCOUNTER
----- Message from Brianna Draper sent at 1/4/2019  4:42 PM CST -----  Patient would like to have a referral to Colon and Rectal and not GI. The patient stated he would like to see Dr. Lawrence or someone in his dept for his issue. Please advise.

## 2019-01-15 ENCOUNTER — OFFICE VISIT (OUTPATIENT)
Dept: SURGERY | Facility: CLINIC | Age: 44
End: 2019-01-15
Payer: COMMERCIAL

## 2019-01-15 VITALS
DIASTOLIC BLOOD PRESSURE: 91 MMHG | BODY MASS INDEX: 42.17 KG/M2 | SYSTOLIC BLOOD PRESSURE: 153 MMHG | HEART RATE: 96 BPM | WEIGHT: 294.56 LBS | HEIGHT: 70 IN

## 2019-01-15 DIAGNOSIS — K64.8 INTERNAL HEMORRHOID, BLEEDING: Primary | ICD-10-CM

## 2019-01-15 PROCEDURE — 3077F PR MOST RECENT SYSTOLIC BLOOD PRESSURE >= 140 MM HG: ICD-10-PCS | Mod: CPTII,S$GLB,, | Performed by: COLON & RECTAL SURGERY

## 2019-01-15 PROCEDURE — 46221 LIGATION OF HEMORRHOID(S): CPT | Mod: S$GLB,,, | Performed by: COLON & RECTAL SURGERY

## 2019-01-15 PROCEDURE — 3077F SYST BP >= 140 MM HG: CPT | Mod: CPTII,S$GLB,, | Performed by: COLON & RECTAL SURGERY

## 2019-01-15 PROCEDURE — 46221 PR HEMORRHOIDECTOMY INTERNAL RUBBER BAND LIGATIONS: ICD-10-PCS | Mod: S$GLB,,, | Performed by: COLON & RECTAL SURGERY

## 2019-01-15 PROCEDURE — 3008F PR BODY MASS INDEX (BMI) DOCUMENTED: ICD-10-PCS | Mod: CPTII,S$GLB,, | Performed by: COLON & RECTAL SURGERY

## 2019-01-15 PROCEDURE — 99999 PR PBB SHADOW E&M-EST. PATIENT-LVL III: CPT | Mod: PBBFAC,,, | Performed by: COLON & RECTAL SURGERY

## 2019-01-15 PROCEDURE — 99213 PR OFFICE/OUTPT VISIT, EST, LEVL III, 20-29 MIN: ICD-10-PCS | Mod: 25,S$GLB,, | Performed by: COLON & RECTAL SURGERY

## 2019-01-15 PROCEDURE — 99999 PR PBB SHADOW E&M-EST. PATIENT-LVL III: ICD-10-PCS | Mod: PBBFAC,,, | Performed by: COLON & RECTAL SURGERY

## 2019-01-15 PROCEDURE — 3080F PR MOST RECENT DIASTOLIC BLOOD PRESSURE >= 90 MM HG: ICD-10-PCS | Mod: CPTII,S$GLB,, | Performed by: COLON & RECTAL SURGERY

## 2019-01-15 PROCEDURE — 99213 OFFICE O/P EST LOW 20 MIN: CPT | Mod: 25,S$GLB,, | Performed by: COLON & RECTAL SURGERY

## 2019-01-15 PROCEDURE — 3008F BODY MASS INDEX DOCD: CPT | Mod: CPTII,S$GLB,, | Performed by: COLON & RECTAL SURGERY

## 2019-01-15 PROCEDURE — 3080F DIAST BP >= 90 MM HG: CPT | Mod: CPTII,S$GLB,, | Performed by: COLON & RECTAL SURGERY

## 2019-01-15 NOTE — LETTER
January 15, 2019      Loretta Bennett, JOSEP  [address]  [address]  [facility] [address]           [facility]  [address]  [place] [address]  Phone: [phone]          Patient: Kyle Abel   MR Number: [id number]   YOB: 1975   Date of Visit: 1/15/2019       Dear Loretta Bennett:    Thank you for referring Kyle Abel to me for evaluation. Attached you will find relevant portions of my assessment and plan of care.    If you have questions, please do not hesitate to call me. I look forward to following Kyle Abel along with you.    Sincerely,    Bal Lawrence MD    Enclosure  CC:  No Recipients    If you would like to receive this communication electronically, please contact [email or web address] or [phone] to request more information on [facility] Link access.    For providers and/or their staff who would like to refer a patient to [facility], please contact us through our one-stop-shop provider referral line, [facility] , at [phone].    If you feel you have received this communication in error or would no longer like to receive these types of communications, please e-mail [email or web address]

## 2019-01-15 NOTE — PROGRESS NOTES
CRS Office Visit Followup  Referring Md:   Loretta Bennett, [place]  [address]  [address]  [place] [address]    SUBJECTIVE:     Chief Complaint: internal hemorrhoids    History of Present Illness:  Patient is a 43 y.o. male presents with internal hemorrhoids. The patient is an established patient to this practice.   Course is as follows:  6/19/18:  He has suffered from internal and external hemorrhoids for multiple years.  His external hemorrhoids of decreased in size over the past few years which she attributes to a topical remedy.  He still has frequent bleeding with bowel movements.  This is associated with passage of clots.  He has intermittent low dull pain when bleeding. No significant perianal mass or protrusion.  No prolapse of tissue. His bowel movements are regular and he does have intermittent constipation.  He tries to moderate this with dietary intake of fiber.      - found to have Grade 2-3 right posterior hemorrhoid.  Grade 1 right anterior hemorrhoid.  Grade 1-2 left lateral hemorrhoid.  Banding was performed on right posterior and left lateral columns  1/15/19:  Did well for a few months and then had multiple episodes of repeat bleeding.  Still struggles with constipation.    No family history of colon or rectal cancer.  No prior anorectal procedures.        Last Colonoscopy: 2017    - One 8 mm polyp in the ascending colon, removed                         with a hot snare. Resected and retrieved. --> pathology was adenoma                        - One 4 mm polyp in the sigmoid colon, removed with                         a cold snare. Resected and retrieved.  --> hyperplastic                        - Internal hemorrhoids.                        - The examination was otherwise normal.      Review of Systems:  Review of Systems   Constitutional: Negative for chills, diaphoresis, fever, malaise/fatigue and weight loss.   HENT: Negative for congestion.    Respiratory: Negative for shortness of  "breath.    Cardiovascular: Negative for chest pain and leg swelling.   Gastrointestinal: Positive for blood in stool and constipation. Negative for abdominal pain, nausea and vomiting.   Genitourinary: Negative for dysuria.   Musculoskeletal: Negative for back pain and myalgias.   Skin: Negative for rash.   Neurological: Negative for dizziness and weakness.   Endo/Heme/Allergies: Does not bruise/bleed easily.   Psychiatric/Behavioral: Negative for depression.       OBJECTIVE:     Vital Signs (Most Recent)  BP (!) 153/91 (BP Location: Left arm, Patient Position: Sitting, BP Method: Large (Automatic))   Pulse 96   Ht 5' 10" (1.778 m)   Wt 133.6 kg (294 lb 8.6 oz)   BMI 42.26 kg/m²     Physical Exam:  General: White male in no distress   Neuro: alert and oriented x 4.  Moves all extremities.     HEENT: no icterus.  Trachea midline  Respiratory: respirations are even and unlabored  Cardiac: regular rate  Abdomen:  Soft, nontender, no masses  Extremities: Warm dry and intact  Skin: no rashes  Anorectal:  External exam reveals very small external skin tags.  Digital exam was performed. A moderate size hemorrhoids felt on the right posterior.  Otherwise perianal tone is normal and the exam was normal.  Anoscopy was then performed. Grade 2-3 right posterior hemorrhoid.  Grade 1=2 right anterior hemorrhoid.  Grade 1 left lateral hemorrhoid    Labs: H/H = 13/43    Imaging: none      ASSESSMENT/PLAN:     Diagnoses and all orders for this visit:    Internal hemorrhoid, bleeding        43-year-old gentleman with rectal bleeding associated to a large posterior right hemorrhoid and smaller left lateral hemorrhoid.  RBL on 6/2018 in the left lateral and right posterior positions.  Repeat banding was performed today on the right posterior and right anterior positions.  This was done in the left lateral position as he had back discomfort following prone positioning.  See him back in 4 weeks for evaluation for repeat " banding.    We discussed the need to take intermittent MiraLax to help with constipation.      Rubber Band Ligation:  Verbal consent was obtained.   Clear plastic anoscope inserted.    Grade II/III hemorrhoids seen on the right posterior position and grade I/II in the right anterior position  Suction applicator was placed above the dentate line.   Rubber bands were deployed in the right posterior and right anterior position.    Patient tolerated the procedure well.       CECILIA Lawrence MD  Staff Surgeon  Colon & Rectal Surgery

## 2019-01-20 ENCOUNTER — HOSPITAL ENCOUNTER (EMERGENCY)
Facility: HOSPITAL | Age: 44
Discharge: HOME | End: 2019-01-20
Attending: EMERGENCY MEDICINE
Payer: COMMERCIAL

## 2019-01-20 VITALS
BODY MASS INDEX: 40.89 KG/M2 | OXYGEN SATURATION: 99 % | SYSTOLIC BLOOD PRESSURE: 134 MMHG | RESPIRATION RATE: 18 BRPM | DIASTOLIC BLOOD PRESSURE: 84 MMHG | WEIGHT: 285 LBS | TEMPERATURE: 99 F | HEART RATE: 86 BPM

## 2019-01-20 DIAGNOSIS — K57.92 DIVERTICULITIS: ICD-10-CM

## 2019-01-20 DIAGNOSIS — K57.32 SIGMOID DIVERTICULITIS: Primary | ICD-10-CM

## 2019-01-20 DIAGNOSIS — R10.9 ABDOMINAL PAIN: ICD-10-CM

## 2019-01-20 LAB
ALBUMIN SERPL BCP-MCNC: 3.9 G/DL
ALP SERPL-CCNC: 67 U/L
ALT SERPL W/O P-5'-P-CCNC: 30 U/L
ANION GAP SERPL CALC-SCNC: 10 MMOL/L
AST SERPL-CCNC: 19 U/L
BASOPHILS # BLD AUTO: 0.01 K/UL
BASOPHILS NFR BLD: 0.1 %
BILIRUB SERPL-MCNC: 1.2 MG/DL
BILIRUB UR QL STRIP: NEGATIVE
BUN SERPL-MCNC: 10 MG/DL
CALCIUM SERPL-MCNC: 9.7 MG/DL
CHLORIDE SERPL-SCNC: 101 MMOL/L
CLARITY UR: CLEAR
CO2 SERPL-SCNC: 27 MMOL/L
COLOR UR: ABNORMAL
CREAT SERPL-MCNC: 1.1 MG/DL
DIFFERENTIAL METHOD: ABNORMAL
EOSINOPHIL # BLD AUTO: 0 K/UL
EOSINOPHIL NFR BLD: 0.3 %
ERYTHROCYTE [DISTWIDTH] IN BLOOD BY AUTOMATED COUNT: 14.5 %
EST. GFR  (AFRICAN AMERICAN): >60 ML/MIN/1.73 M^2
EST. GFR  (NON AFRICAN AMERICAN): >60 ML/MIN/1.73 M^2
GLUCOSE SERPL-MCNC: 110 MG/DL
GLUCOSE UR QL STRIP: NEGATIVE
HCT VFR BLD AUTO: 47.5 %
HGB BLD-MCNC: 15.3 G/DL
HGB UR QL STRIP: NEGATIVE
KETONES UR QL STRIP: NEGATIVE
LACTATE SERPL-SCNC: 1.7 MMOL/L
LEUKOCYTE ESTERASE UR QL STRIP: NEGATIVE
LIPASE SERPL-CCNC: 10 U/L
LYMPHOCYTES # BLD AUTO: 0.9 K/UL
LYMPHOCYTES NFR BLD: 8.4 %
MCH RBC QN AUTO: 28.3 PG
MCHC RBC AUTO-ENTMCNC: 32.2 G/DL
MCV RBC AUTO: 88 FL
MONOCYTES # BLD AUTO: 0.9 K/UL
MONOCYTES NFR BLD: 8.2 %
NEUTROPHILS # BLD AUTO: 9.1 K/UL
NEUTROPHILS NFR BLD: 82.6 %
NITRITE UR QL STRIP: NEGATIVE
PH UR STRIP: 8 [PH] (ref 5–8)
PLATELET # BLD AUTO: 160 K/UL
PMV BLD AUTO: 11 FL
POTASSIUM SERPL-SCNC: 3.9 MMOL/L
PROT SERPL-MCNC: 7.6 G/DL
PROT UR QL STRIP: ABNORMAL
RBC # BLD AUTO: 5.4 M/UL
SODIUM SERPL-SCNC: 138 MMOL/L
SP GR UR STRIP: 1.01 (ref 1–1.03)
URN SPEC COLLECT METH UR: ABNORMAL
UROBILINOGEN UR STRIP-ACNC: 1 EU/DL
WBC # BLD AUTO: 11.03 K/UL

## 2019-01-20 PROCEDURE — 25500020 PHARM REV CODE 255: Performed by: EMERGENCY MEDICINE

## 2019-01-20 PROCEDURE — 85025 COMPLETE CBC W/AUTO DIFF WBC: CPT

## 2019-01-20 PROCEDURE — 83690 ASSAY OF LIPASE: CPT

## 2019-01-20 PROCEDURE — 81003 URINALYSIS AUTO W/O SCOPE: CPT

## 2019-01-20 PROCEDURE — 96374 THER/PROPH/DIAG INJ IV PUSH: CPT

## 2019-01-20 PROCEDURE — 96361 HYDRATE IV INFUSION ADD-ON: CPT

## 2019-01-20 PROCEDURE — 63600175 PHARM REV CODE 636 W HCPCS: Performed by: EMERGENCY MEDICINE

## 2019-01-20 PROCEDURE — 96375 TX/PRO/DX INJ NEW DRUG ADDON: CPT

## 2019-01-20 PROCEDURE — 80053 COMPREHEN METABOLIC PANEL: CPT

## 2019-01-20 PROCEDURE — 99285 EMERGENCY DEPT VISIT HI MDM: CPT | Mod: 25

## 2019-01-20 PROCEDURE — 25000003 PHARM REV CODE 250: Performed by: EMERGENCY MEDICINE

## 2019-01-20 PROCEDURE — 83605 ASSAY OF LACTIC ACID: CPT

## 2019-01-20 PROCEDURE — 96376 TX/PRO/DX INJ SAME DRUG ADON: CPT

## 2019-01-20 RX ORDER — ONDANSETRON 2 MG/ML
4 INJECTION INTRAMUSCULAR; INTRAVENOUS
Status: COMPLETED | OUTPATIENT
Start: 2019-01-20 | End: 2019-01-20

## 2019-01-20 RX ORDER — HYOSCYAMINE SULFATE 0.5 MG/ML
0.5 INJECTION, SOLUTION SUBCUTANEOUS
Status: COMPLETED | OUTPATIENT
Start: 2019-01-20 | End: 2019-01-20

## 2019-01-20 RX ORDER — METRONIDAZOLE 500 MG/1
500 TABLET ORAL EVERY 8 HOURS
Status: DISCONTINUED | OUTPATIENT
Start: 2019-01-20 | End: 2019-01-21 | Stop reason: HOSPADM

## 2019-01-20 RX ORDER — CIPROFLOXACIN 500 MG/1
500 TABLET ORAL EVERY 12 HOURS
Status: DISCONTINUED | OUTPATIENT
Start: 2019-01-20 | End: 2019-01-21 | Stop reason: HOSPADM

## 2019-01-20 RX ORDER — MORPHINE SULFATE 4 MG/ML
4 INJECTION, SOLUTION INTRAMUSCULAR; INTRAVENOUS
Status: COMPLETED | OUTPATIENT
Start: 2019-01-20 | End: 2019-01-20

## 2019-01-20 RX ORDER — METRONIDAZOLE 500 MG/1
500 TABLET ORAL EVERY 6 HOURS
Qty: 28 TABLET | Refills: 0 | Status: ON HOLD | OUTPATIENT
Start: 2019-01-20 | End: 2019-02-06 | Stop reason: HOSPADM

## 2019-01-20 RX ORDER — ONDANSETRON 4 MG/1
8 TABLET, ORALLY DISINTEGRATING ORAL EVERY 8 HOURS PRN
Qty: 12 TABLET | Refills: 0 | Status: SHIPPED | OUTPATIENT
Start: 2019-01-20 | End: 2019-01-23

## 2019-01-20 RX ORDER — SYRING-NEEDL,DISP,INSUL,0.3 ML 29 G X1/2"
296 SYRINGE, EMPTY DISPOSABLE MISCELLANEOUS
Status: DISCONTINUED | OUTPATIENT
Start: 2019-01-20 | End: 2019-01-20

## 2019-01-20 RX ORDER — CIPROFLOXACIN 500 MG/1
500 TABLET ORAL EVERY 12 HOURS
Qty: 14 TABLET | Refills: 0 | Status: ON HOLD | OUTPATIENT
Start: 2019-01-20 | End: 2019-02-06 | Stop reason: HOSPADM

## 2019-01-20 RX ORDER — SODIUM CHLORIDE 9 MG/ML
1000 INJECTION, SOLUTION INTRAVENOUS
Status: COMPLETED | OUTPATIENT
Start: 2019-01-20 | End: 2019-01-20

## 2019-01-20 RX ORDER — PSEUDOEPHEDRINE/ACETAMINOPHEN 30MG-500MG
100 TABLET ORAL
Status: DISCONTINUED | OUTPATIENT
Start: 2019-01-20 | End: 2019-01-20

## 2019-01-20 RX ORDER — OXYCODONE AND ACETAMINOPHEN 5; 325 MG/1; MG/1
1 TABLET ORAL EVERY 6 HOURS PRN
Qty: 12 TABLET | Refills: 0 | Status: SHIPPED | OUTPATIENT
Start: 2019-01-20 | End: 2019-01-23

## 2019-01-20 RX ADMIN — MORPHINE SULFATE 4 MG: 4 INJECTION INTRAVENOUS at 08:01

## 2019-01-20 RX ADMIN — HYOSCYAMINE SULFATE 0.5 MG: 0.5 INJECTION, SOLUTION SUBCUTANEOUS at 08:01

## 2019-01-20 RX ADMIN — MORPHINE SULFATE 4 MG: 4 INJECTION INTRAVENOUS at 09:01

## 2019-01-20 RX ADMIN — CIPROFLOXACIN HYDROCHLORIDE 500 MG: 500 TABLET, FILM COATED ORAL at 10:01

## 2019-01-20 RX ADMIN — METRONIDAZOLE 500 MG: 500 TABLET ORAL at 10:01

## 2019-01-20 RX ADMIN — SODIUM CHLORIDE 1000 ML: 0.9 INJECTION, SOLUTION INTRAVENOUS at 07:01

## 2019-01-20 RX ADMIN — ONDANSETRON 4 MG: 2 INJECTION, SOLUTION INTRAMUSCULAR; INTRAVENOUS at 09:01

## 2019-01-20 RX ADMIN — IOHEXOL 100 ML: 350 INJECTION, SOLUTION INTRAVENOUS at 09:01

## 2019-01-21 NOTE — DISCHARGE INSTRUCTIONS
Take all medications as prescribed.  Follow up with your PCP in one week.  Return to the ED if symptoms fail to improve.  Utilize a bland diet over then next week.

## 2019-01-21 NOTE — ED PROVIDER NOTES
Encounter Date: 1/20/2019    SCRIBE #1 NOTE: I, Isabel Mejia, am scribing for, and in the presence of,  Dr. Smith. I have scribed the entire note.       History     Chief Complaint   Patient presents with    Abdominal Pain     states constipation took oral laxatives and suppository had bm and continues with abdominal pain      This is a 43 y.o. male who presents with complaint of abdominal pain. The patient describes the pain as being located in the left lower quadrant without radiation of side pain.  He reports onset of symptoms was about 2-3 days ago. The patient states prior to onset of symptoms he was having constipation. He then tried using laxatives and suppositories for relief.  At this time, the patient believes that he might be constipated.  The patient was able to have loose stool like diarrhea after the laxatives 2 days ago.  He denies any vomiting, change in appetite, fever or chills but admits to nausea during diarrhea. The patient denies a history of abdominal surgeries. He does report a history of internal hemorrhoids with banding performed by Dr. Lawrence 5 days ago.  Patient denies any chest pain, shortness of breath, fever, chills, blood in stool, numbness or tingling.          The history is provided by the patient.     Review of patient's allergies indicates:   Allergen Reactions    Penicillins Swelling     As a child age 11     Past Medical History:   Diagnosis Date    Colon polyps 2017    repeat colonoscopy in 5 years    Hemorrhoid     Hypertension     Diagnosed @ 1 1/2 years ago and medication was prescribed - patient reports he did not take medication and does not want treatment for blood pressure    Internal hemorrhoid, bleeding     Rectal bleed      Past Surgical History:   Procedure Laterality Date    COLONOSCOPY N/A 9/6/2017    Performed by Chapin Hernandez MD at [facility]     Family History   Problem Relation Age of Onset    No Known Problems Mother      Ulcerative colitis Father 57        intestines     No Known Problems Brother      Social History     Tobacco Use    Smoking status: Former Smoker     Packs/day: 1.50     Years: 25.00     Pack years: 37.50     Types: Cigarettes     Last attempt to quit: 6/10/2018     Years since quittin.6    Smokeless tobacco: Current User     Types: Chew   Substance Use Topics    Alcohol use: Yes     Comment: every weekend - 4 drinks either beer or vodka per occasion    Drug use: No     Review of Systems   Constitutional: Negative for chills and fever.   HENT: Negative for congestion, ear pain, rhinorrhea and sore throat.    Eyes: Negative for photophobia and visual disturbance.   Respiratory: Negative for cough, shortness of breath and wheezing.    Cardiovascular: Negative for chest pain and palpitations.   Gastrointestinal: Positive for abdominal pain, constipation, diarrhea and nausea. Negative for vomiting.   Genitourinary: Negative for dysuria and hematuria.   Musculoskeletal: Negative for back pain, myalgias and neck pain.   Skin: Negative for rash.   Allergic/Immunologic: Negative for immunocompromised state.   Neurological: Negative for dizziness, weakness, light-headedness and headaches.   Psychiatric/Behavioral: Negative for confusion.       Physical Exam     Initial Vitals   BP Pulse Resp Temp SpO2   19 1837 19 1836 19 1836 19 1836 19 1836   (!) 133/95 109 20 98.7 °F (37.1 °C) 99 %      MAP       --                Physical Exam    Nursing note and vitals reviewed.  Constitutional: He appears well-developed and well-nourished. He is not diaphoretic. He does not appear ill. No distress.   HENT:   Head: Normocephalic and atraumatic.   Mouth/Throat: Oropharynx is clear and moist.   Eyes: Conjunctivae and EOM are normal. Pupils are equal, round, and reactive to light. No scleral icterus.   Neck: Normal range of motion. Neck supple.   Cardiovascular: Normal rate, regular rhythm and normal  heart sounds. Exam reveals no gallop and no friction rub.    No murmur heard.  Pulmonary/Chest: Breath sounds normal. He has no wheezes. He has no rhonchi. He has no rales.   Abdominal: Soft. Normal appearance and bowel sounds are normal. There is tenderness in the left lower quadrant. There is no rebound, no guarding (mild), no tenderness at McBurney's point and negative Love's sign.   Obese abdomen; tender to palpation left lower quadrant.  Positive bowel sounds in all 4 quadrants.  No rebound, no guarding no peritoneal signs. No Love's sign. Negative McBurney's point tenderness. No organomegaly.   Musculoskeletal: Normal range of motion. He exhibits no edema or tenderness.   Lymphadenopathy:     He has no cervical adenopathy.   Neurological: He is alert and oriented to person, place, and time. He has normal strength.   Skin: Skin is warm and dry. Capillary refill takes less than 2 seconds. No rash noted.   Psychiatric: He has a normal mood and affect.         ED Course   Procedures  Labs Reviewed   CBC W/ AUTO DIFFERENTIAL - Abnormal; Notable for the following components:       Result Value    Gran # (ANC) 9.1 (*)     Lymph # 0.9 (*)     Gran% 82.6 (*)     Lymph% 8.4 (*)     All other components within normal limits   COMPREHENSIVE METABOLIC PANEL - Abnormal; Notable for the following components:    Total Bilirubin 1.2 (*)     All other components within normal limits   URINALYSIS, REFLEX TO URINE CULTURE - Abnormal; Notable for the following components:    Color, UA Red (*)     Protein, UA Trace (*)     All other components within normal limits    Narrative:     Preferred Collection Type->Urine, Clean Catch   LIPASE   LACTIC ACID, PLASMA          Imaging Results          CT Abdomen Pelvis With Contrast (Final result)  Result time 01/20/19 21:15:10    Final result by Oneida Roberts MD (01/20/19 21:15:10)                 Impression:      Acute diverticulitis involving the proximal sigmoid colon with  extensive inflammatory changes seen.  Small focus of air seen in the region which may represent air within a diverticulum versus microperforation.  No evidence of focal fluid collection or abscess.      Electronically signed by: Oneida Roberts MD  Date:    01/20/2019  Time:    21:15             Narrative:    EXAMINATION:  CT ABDOMEN PELVIS WITH CONTRAST    CLINICAL HISTORY:  Abdominal distension;    TECHNIQUE:  Low dose axial images, sagittal and coronal reformations were obtained from the lung bases to the pubic symphysis following the IV administration of 100 mL of Omnipaque 350 .  Oral contrast was not given.    COMPARISON:  CT abdomen and pelvis from October 2012.    FINDINGS:  The visualized portion of the heart is unremarkable.  The lung bases are clear.    No significant hepatic abnormalities are identified.  There is no intra-or extrahepatic biliary ductal dilatation.  The gallbladder is unremarkable.  Stomach, pancreas, and adrenal glands are unremarkable.  Trace subcapsular fluid is seen at the lateral aspect of the spleen noting previous findings of remote splenic injury.    Kidneys, ureters, urinary bladder, and prostate are unremarkable.    Extensive inflammatory changes and mild wall thickening are seen involving the proximal sigmoid colon in a region of multiple diverticula suggestive for acute diverticulitis.  Small focus of air is seen within the region which likely reflects air within diverticulum as opposed to potential micro perforation.  No evidence of focal fluid collections or rim enhancing abscess.  No evidence of bowel obstruction.    Aorta tapers normally.    No acute osseous abnormality identified. Subcutaneous soft tissue structures are unremarkable.                               X-Ray Abdomen Flat And Erect (Final result)  Result time 01/20/19 19:52:51    Final result by Samir Duarte MD (01/20/19 19:52:51)                 Impression:      Nonobstructive bowel gas  pattern.      Electronically signed by: Samir Duarte MD  Date:    01/20/2019  Time:    19:52             Narrative:    EXAMINATION:  XR ABDOMEN FLAT AND ERECT    CLINICAL HISTORY:  Unspecified abdominal pain    TECHNIQUE:  Flat and erect AP views of the abdomen were performed.    COMPARISON:  CT abdomen and pelvis 10/01/2012    FINDINGS:  Resolution is limited by body habitus with underpenetration.  Nonobstructive bowel gas pattern.  Hepatic shadow appears prominent without significant mass effect.  No free air or abnormal intra-abdominal calcification seen.  No large consolidation at the included lung bases.  Mild degenerative change without acute process seen.                                 Medical Decision Making:   Clinical Tests:   Lab Tests: Ordered and Reviewed  Radiological Study: Ordered and Reviewed  ED Management:  - CBC w/diff with normal H&H; no leukocytosis  - CMP within normal limits; no electrolyte abnormalities noted  - Lipase within normal limits  - UA within normal limits  - Lactic acid within normal limits  - Plain radiograph of abdomen does not demonstrate any free air, air fluid levels or signs of obstruction or constipation; non-obstructive gas pattern  - Pt administered IV morphine with no improvement in pain  - Pt  administered 1 L normal saline IV fluid bolus  - CT scan of abdomen pelvis demonstrates sigmoid diverticulitis  - patient administered additional IV morphine with mild improvement in pain   -Patient has some improvement of pain after 2nd dose of Morphine  -He is able to tolerate PO  -CT of abdomen is suggestive of sigmoid diverticulitis  -Will make ambulatory appointment with GI  -Will give dose of Cipro and Flagyl in ED and discharge home with Cipro, Flagyl, pain medication and antiemetic.  -Recommend bland diet for 2-3 days  -Patient given strict return precautions                   ED Course as of Jan 20 2230   Sun Jan 20, 2019   1838 This is a SORT/MSE of a 43 y.o. male  presenting to the ED with c/o abdominal pain and constipation since this a.m. Patient reports that he took oral laxatives  and suppository and had a BM continues to have pain. Care will be transferred to an alternate provider when patient is roomed for a full evaluation and final disposition. YOSI Pitts 6:38 PM   [CH]      ED Course User Index  [CH] Saul Borrego NP     Clinical Impression:     1. Sigmoid diverticulitis    2. Abdominal pain    3. Diverticulitis        Disposition:   Disposition: Discharged  Condition: Stable    I, Ambrose Smith,  personally performed the services described in this documentation. All medical record entries made by the scribe were at my direction and in my presence.  I have reviewed the chart and agree that the record reflects my personal performance and is accurate and complete. Ambrose Smith M.D. 10:30 PM01/20/2019                   Ambrose Smith MD  01/20/19 [id number]

## 2019-01-23 ENCOUNTER — TELEPHONE (OUTPATIENT)
Dept: FAMILY MEDICINE | Facility: CLINIC | Age: 44
End: 2019-01-23

## 2019-01-23 NOTE — TELEPHONE ENCOUNTER
----- Message from Minna Vela sent at 1/23/2019  9:03 AM CST -----  Contact: Self/ [phone]  Patient asked to speak with you about his recent hospital visit.    Please call.

## 2019-01-23 NOTE — TELEPHONE ENCOUNTER
Spoke with patient he wanted to let Gilma know that he had surgery on 1-20-19 ans was told to let his PCP know. Patient has a F/U to see Gilma on 1-28-19.

## 2019-01-28 ENCOUNTER — HOSPITAL ENCOUNTER (INPATIENT)
Facility: HOSPITAL | Age: 44
LOS: 9 days | Discharge: HOME | DRG: 329 | End: 2019-02-06
Attending: EMERGENCY MEDICINE | Admitting: COLON & RECTAL SURGERY
Payer: COMMERCIAL

## 2019-01-28 ENCOUNTER — OFFICE VISIT (OUTPATIENT)
Dept: FAMILY MEDICINE | Facility: CLINIC | Age: 44
End: 2019-01-28
Payer: COMMERCIAL

## 2019-01-28 VITALS
HEIGHT: 70 IN | TEMPERATURE: 98 F | RESPIRATION RATE: 18 BRPM | DIASTOLIC BLOOD PRESSURE: 78 MMHG | BODY MASS INDEX: 41.17 KG/M2 | SYSTOLIC BLOOD PRESSURE: 124 MMHG | HEART RATE: 85 BPM | WEIGHT: 287.56 LBS | OXYGEN SATURATION: 97 %

## 2019-01-28 DIAGNOSIS — R10.32 ACUTE BILATERAL LOWER ABDOMINAL PAIN: Primary | ICD-10-CM

## 2019-01-28 DIAGNOSIS — R10.31 ACUTE BILATERAL LOWER ABDOMINAL PAIN: Primary | ICD-10-CM

## 2019-01-28 DIAGNOSIS — K57.92 DIVERTICULITIS: ICD-10-CM

## 2019-01-28 DIAGNOSIS — K57.32 SIGMOID DIVERTICULITIS: Primary | ICD-10-CM

## 2019-01-28 DIAGNOSIS — Z01.810 PREOP CARDIOVASCULAR EXAM: ICD-10-CM

## 2019-01-28 LAB
ALBUMIN SERPL BCP-MCNC: 3.8 G/DL
ALP SERPL-CCNC: 61 U/L
ALT SERPL W/O P-5'-P-CCNC: 55 U/L
ANION GAP SERPL CALC-SCNC: 9 MMOL/L
AST SERPL-CCNC: 24 U/L
BACTERIA #/AREA URNS AUTO: ABNORMAL /HPF
BASOPHILS # BLD AUTO: 0.06 K/UL
BASOPHILS NFR BLD: 0.4 %
BILIRUB SERPL-MCNC: 0.5 MG/DL
BILIRUB UR QL STRIP: NEGATIVE
BUN SERPL-MCNC: 14 MG/DL
BUN SERPL-MCNC: 14 MG/DL (ref 6–30)
CALCIUM SERPL-MCNC: 9.7 MG/DL
CHLORIDE SERPL-SCNC: 102 MMOL/L (ref 95–110)
CHLORIDE SERPL-SCNC: 103 MMOL/L
CLARITY UR REFRACT.AUTO: CLEAR
CO2 SERPL-SCNC: 24 MMOL/L
COLOR UR AUTO: ABNORMAL
CREAT SERPL-MCNC: 1 MG/DL
CREAT SERPL-MCNC: 1 MG/DL (ref 0.5–1.4)
DIFFERENTIAL METHOD: ABNORMAL
EOSINOPHIL # BLD AUTO: 0 K/UL
EOSINOPHIL NFR BLD: 0.2 %
ERYTHROCYTE [DISTWIDTH] IN BLOOD BY AUTOMATED COUNT: 14.2 %
EST. GFR  (AFRICAN AMERICAN): >60 ML/MIN/1.73 M^2
EST. GFR  (NON AFRICAN AMERICAN): >60 ML/MIN/1.73 M^2
GLUCOSE SERPL-MCNC: 100 MG/DL
GLUCOSE SERPL-MCNC: 102 MG/DL (ref 70–110)
GLUCOSE UR QL STRIP: NEGATIVE
HCT VFR BLD AUTO: 47.8 %
HCT VFR BLD CALC: 45 %PCV (ref 36–54)
HGB BLD-MCNC: 15.3 G/DL
HGB UR QL STRIP: NEGATIVE
IMM GRANULOCYTES # BLD AUTO: 0.2 K/UL
IMM GRANULOCYTES NFR BLD AUTO: 1.2 %
KETONES UR QL STRIP: NEGATIVE
LACTATE SERPL-SCNC: 1.2 MMOL/L
LEUKOCYTE ESTERASE UR QL STRIP: ABNORMAL
LIPASE SERPL-CCNC: 18 U/L
LYMPHOCYTES # BLD AUTO: 1 K/UL
LYMPHOCYTES NFR BLD: 6.3 %
MCH RBC QN AUTO: 28.7 PG
MCHC RBC AUTO-ENTMCNC: 32 G/DL
MCV RBC AUTO: 90 FL
MICROSCOPIC COMMENT: ABNORMAL
MONOCYTES # BLD AUTO: 1.3 K/UL
MONOCYTES NFR BLD: 8 %
NEUTROPHILS # BLD AUTO: 13.7 K/UL
NEUTROPHILS NFR BLD: 83.9 %
NITRITE UR QL STRIP: NEGATIVE
NRBC BLD-RTO: 0 /100 WBC
PH UR STRIP: 5 [PH] (ref 5–8)
PLATELET # BLD AUTO: 262 K/UL
PMV BLD AUTO: 11.2 FL
POC IONIZED CALCIUM: 1.1 MMOL/L (ref 1.06–1.42)
POC TCO2 (MEASURED): 24 MMOL/L (ref 23–29)
POTASSIUM BLD-SCNC: 4.2 MMOL/L (ref 3.5–5.1)
POTASSIUM SERPL-SCNC: 4.2 MMOL/L
PROT SERPL-MCNC: 7.8 G/DL
PROT UR QL STRIP: NEGATIVE
RBC # BLD AUTO: 5.33 M/UL
RBC #/AREA URNS AUTO: 1 /HPF (ref 0–4)
SAMPLE: NORMAL
SODIUM BLD-SCNC: 139 MMOL/L (ref 136–145)
SODIUM SERPL-SCNC: 136 MMOL/L
SP GR UR STRIP: >=1.03 (ref 1–1.03)
URN SPEC COLLECT METH UR: ABNORMAL
WBC # BLD AUTO: 16.38 K/UL
WBC #/AREA URNS AUTO: 1 /HPF (ref 0–5)

## 2019-01-28 PROCEDURE — 20600001 HC STEP DOWN PRIVATE ROOM

## 2019-01-28 PROCEDURE — 99285 PR EMERGENCY DEPT VISIT,LEVEL V: ICD-10-PCS | Mod: ,,, | Performed by: PHYSICIAN ASSISTANT

## 2019-01-28 PROCEDURE — 3074F PR MOST RECENT SYSTOLIC BLOOD PRESSURE < 130 MM HG: ICD-10-PCS | Mod: CPTII,S$GLB,, | Performed by: NURSE PRACTITIONER

## 2019-01-28 PROCEDURE — A4216 STERILE WATER/SALINE, 10 ML: HCPCS | Performed by: NURSE PRACTITIONER

## 2019-01-28 PROCEDURE — S0030 INJECTION, METRONIDAZOLE: HCPCS | Performed by: PHYSICIAN ASSISTANT

## 2019-01-28 PROCEDURE — 3008F PR BODY MASS INDEX (BMI) DOCUMENTED: ICD-10-PCS | Mod: CPTII,S$GLB,, | Performed by: NURSE PRACTITIONER

## 2019-01-28 PROCEDURE — 83690 ASSAY OF LIPASE: CPT

## 2019-01-28 PROCEDURE — 81001 URINALYSIS AUTO W/SCOPE: CPT

## 2019-01-28 PROCEDURE — 63600175 PHARM REV CODE 636 W HCPCS: Performed by: PHYSICIAN ASSISTANT

## 2019-01-28 PROCEDURE — 80053 COMPREHEN METABOLIC PANEL: CPT

## 2019-01-28 PROCEDURE — 96367 TX/PROPH/DG ADDL SEQ IV INF: CPT

## 2019-01-28 PROCEDURE — 99285 EMERGENCY DEPT VISIT HI MDM: CPT | Mod: ,,, | Performed by: PHYSICIAN ASSISTANT

## 2019-01-28 PROCEDURE — 25000003 PHARM REV CODE 250: Performed by: NURSE PRACTITIONER

## 2019-01-28 PROCEDURE — 99213 PR OFFICE/OUTPT VISIT, EST, LEVL III, 20-29 MIN: ICD-10-PCS | Mod: S$GLB,,, | Performed by: NURSE PRACTITIONER

## 2019-01-28 PROCEDURE — 87040 BLOOD CULTURE FOR BACTERIA: CPT | Mod: 59

## 2019-01-28 PROCEDURE — 96376 TX/PRO/DX INJ SAME DRUG ADON: CPT

## 2019-01-28 PROCEDURE — 63600175 PHARM REV CODE 636 W HCPCS: Performed by: STUDENT IN AN ORGANIZED HEALTH CARE EDUCATION/TRAINING PROGRAM

## 2019-01-28 PROCEDURE — 3008F BODY MASS INDEX DOCD: CPT | Mod: CPTII,S$GLB,, | Performed by: NURSE PRACTITIONER

## 2019-01-28 PROCEDURE — 99999 PR PBB SHADOW E&M-EST. PATIENT-LVL III: ICD-10-PCS | Mod: PBBFAC,,, | Performed by: NURSE PRACTITIONER

## 2019-01-28 PROCEDURE — 99999 PR PBB SHADOW E&M-EST. PATIENT-LVL III: CPT | Mod: PBBFAC,,, | Performed by: NURSE PRACTITIONER

## 2019-01-28 PROCEDURE — 96361 HYDRATE IV INFUSION ADD-ON: CPT

## 2019-01-28 PROCEDURE — 99285 EMERGENCY DEPT VISIT HI MDM: CPT | Mod: 25

## 2019-01-28 PROCEDURE — 3074F SYST BP LT 130 MM HG: CPT | Mod: CPTII,S$GLB,, | Performed by: NURSE PRACTITIONER

## 2019-01-28 PROCEDURE — 85025 COMPLETE CBC W/AUTO DIFF WBC: CPT

## 2019-01-28 PROCEDURE — 83605 ASSAY OF LACTIC ACID: CPT

## 2019-01-28 PROCEDURE — 99213 OFFICE O/P EST LOW 20 MIN: CPT | Mod: S$GLB,,, | Performed by: NURSE PRACTITIONER

## 2019-01-28 PROCEDURE — 3078F DIAST BP <80 MM HG: CPT | Mod: CPTII,S$GLB,, | Performed by: NURSE PRACTITIONER

## 2019-01-28 PROCEDURE — 63600175 PHARM REV CODE 636 W HCPCS: Performed by: NURSE PRACTITIONER

## 2019-01-28 PROCEDURE — 96365 THER/PROPH/DIAG IV INF INIT: CPT

## 2019-01-28 PROCEDURE — 25500020 PHARM REV CODE 255: Performed by: EMERGENCY MEDICINE

## 2019-01-28 PROCEDURE — 96375 TX/PRO/DX INJ NEW DRUG ADDON: CPT

## 2019-01-28 PROCEDURE — 25000003 PHARM REV CODE 250: Performed by: PHYSICIAN ASSISTANT

## 2019-01-28 PROCEDURE — 3078F PR MOST RECENT DIASTOLIC BLOOD PRESSURE < 80 MM HG: ICD-10-PCS | Mod: CPTII,S$GLB,, | Performed by: NURSE PRACTITIONER

## 2019-01-28 RX ORDER — HYDROMORPHONE HYDROCHLORIDE 2 MG/ML
2 INJECTION, SOLUTION INTRAMUSCULAR; INTRAVENOUS; SUBCUTANEOUS
Status: COMPLETED | OUTPATIENT
Start: 2019-01-28 | End: 2019-01-28

## 2019-01-28 RX ORDER — METRONIDAZOLE 500 MG/100ML
500 INJECTION, SOLUTION INTRAVENOUS
Status: COMPLETED | OUTPATIENT
Start: 2019-01-28 | End: 2019-01-28

## 2019-01-28 RX ORDER — CEFEPIME HYDROCHLORIDE 2 G/1
2 INJECTION, POWDER, FOR SOLUTION INTRAVENOUS
Status: DISCONTINUED | OUTPATIENT
Start: 2019-01-28 | End: 2019-01-28

## 2019-01-28 RX ORDER — NALOXONE HCL 0.4 MG/ML
0.02 VIAL (ML) INJECTION
Status: DISCONTINUED | OUTPATIENT
Start: 2019-01-28 | End: 2019-02-01

## 2019-01-28 RX ORDER — SODIUM CHLORIDE 9 MG/ML
INJECTION, SOLUTION INTRAVENOUS CONTINUOUS
Status: DISCONTINUED | OUTPATIENT
Start: 2019-01-28 | End: 2019-01-29

## 2019-01-28 RX ORDER — ONDANSETRON 2 MG/ML
4 INJECTION INTRAMUSCULAR; INTRAVENOUS EVERY 6 HOURS PRN
Status: DISCONTINUED | OUTPATIENT
Start: 2019-01-28 | End: 2019-02-01

## 2019-01-28 RX ORDER — ACETAMINOPHEN 10 MG/ML
1000 INJECTION, SOLUTION INTRAVENOUS EVERY 8 HOURS
Status: COMPLETED | OUTPATIENT
Start: 2019-01-28 | End: 2019-01-29

## 2019-01-28 RX ORDER — SODIUM CHLORIDE 0.9 % (FLUSH) 0.9 %
5 SYRINGE (ML) INJECTION
Status: DISCONTINUED | OUTPATIENT
Start: 2019-01-28 | End: 2019-02-01

## 2019-01-28 RX ORDER — SODIUM CHLORIDE 9 MG/ML
1000 INJECTION, SOLUTION INTRAVENOUS
Status: COMPLETED | OUTPATIENT
Start: 2019-01-28 | End: 2019-01-28

## 2019-01-28 RX ORDER — HYDROMORPHONE HYDROCHLORIDE 1 MG/ML
1 INJECTION, SOLUTION INTRAMUSCULAR; INTRAVENOUS; SUBCUTANEOUS EVERY 4 HOURS PRN
Status: DISCONTINUED | OUTPATIENT
Start: 2019-01-28 | End: 2019-01-29

## 2019-01-28 RX ORDER — HYDROMORPHONE HYDROCHLORIDE 1 MG/ML
1 INJECTION, SOLUTION INTRAMUSCULAR; INTRAVENOUS; SUBCUTANEOUS
Status: COMPLETED | OUTPATIENT
Start: 2019-01-28 | End: 2019-01-28

## 2019-01-28 RX ORDER — CIPROFLOXACIN 2 MG/ML
400 INJECTION, SOLUTION INTRAVENOUS
Status: COMPLETED | OUTPATIENT
Start: 2019-01-28 | End: 2019-01-28

## 2019-01-28 RX ORDER — ONDANSETRON 2 MG/ML
4 INJECTION INTRAMUSCULAR; INTRAVENOUS
Status: COMPLETED | OUTPATIENT
Start: 2019-01-28 | End: 2019-01-28

## 2019-01-28 RX ORDER — SODIUM CHLORIDE 0.9 % (FLUSH) 0.9 %
3 SYRINGE (ML) INJECTION EVERY 8 HOURS
Status: DISCONTINUED | OUTPATIENT
Start: 2019-01-28 | End: 2019-02-01

## 2019-01-28 RX ADMIN — ONDANSETRON 4 MG: 2 INJECTION INTRAMUSCULAR; INTRAVENOUS at 04:01

## 2019-01-28 RX ADMIN — HYDROMORPHONE HYDROCHLORIDE 1 MG: 1 INJECTION, SOLUTION INTRAMUSCULAR; INTRAVENOUS; SUBCUTANEOUS at 09:01

## 2019-01-28 RX ADMIN — IOHEXOL 100 ML: 350 INJECTION, SOLUTION INTRAVENOUS at 12:01

## 2019-01-28 RX ADMIN — ACETAMINOPHEN 1000 MG: 10 INJECTION, SOLUTION INTRAVENOUS at 09:01

## 2019-01-28 RX ADMIN — Medication 3 ML: at 10:01

## 2019-01-28 RX ADMIN — METRONIDAZOLE 500 MG: 500 SOLUTION INTRAVENOUS at 02:01

## 2019-01-28 RX ADMIN — ONDANSETRON 4 MG: 2 INJECTION INTRAMUSCULAR; INTRAVENOUS at 11:01

## 2019-01-28 RX ADMIN — HYDROMORPHONE HYDROCHLORIDE 1 MG: 1 INJECTION, SOLUTION INTRAMUSCULAR; INTRAVENOUS; SUBCUTANEOUS at 04:01

## 2019-01-28 RX ADMIN — HYDROMORPHONE HYDROCHLORIDE 1 MG: 1 INJECTION, SOLUTION INTRAMUSCULAR; INTRAVENOUS; SUBCUTANEOUS at 11:01

## 2019-01-28 RX ADMIN — CIPROFLOXACIN 400 MG: 2 INJECTION, SOLUTION INTRAVENOUS at 03:01

## 2019-01-28 RX ADMIN — SODIUM CHLORIDE 1000 ML: 0.9 INJECTION, SOLUTION INTRAVENOUS at 11:01

## 2019-01-28 RX ADMIN — SODIUM CHLORIDE 1000 ML: 0.9 INJECTION, SOLUTION INTRAVENOUS at 02:01

## 2019-01-28 RX ADMIN — HYDROMORPHONE HYDROCHLORIDE 2 MG: 2 INJECTION INTRAMUSCULAR; INTRAVENOUS; SUBCUTANEOUS at 01:01

## 2019-01-28 RX ADMIN — SODIUM CHLORIDE: 0.9 INJECTION, SOLUTION INTRAVENOUS at 10:01

## 2019-01-28 RX ADMIN — ONDANSETRON 4 MG: 2 INJECTION INTRAMUSCULAR; INTRAVENOUS at 09:01

## 2019-01-28 NOTE — ASSESSMENT & PLAN NOTE
43 year old male failed outpt treatment for diverticulitis    -admit to Dr. sykes  -npo  -may have ice sparingly  -begin IB cipro/flagyl  -will consult ID due to severe allergy to PCN  -seerial abd exams  -daily labs  -pt and family agree with plans

## 2019-01-28 NOTE — ED PROVIDER NOTES
Encounter Date: 1/28/2019       History     Chief Complaint   Patient presents with    Abdominal Pain     hx divertic from last week     43 year old male with medical history of HTN, internal hemorrhoid presenting to the ED with the chief complaint of abdominal pain. Patient reports developing lower abdominal pain 1 week ago. He was evaluated at [facility] and diagnosed with sigmoid diverticulitis with possible microperforation. Patient was discharged on Cipro/Flagyl and completes the course today. He reports his abdominal pain gradually improved. He reports waking up with acutely worsened lower abdominal pain. He reports the pain feels similar to his diverticulitis pain, but the pain radiates down to his pelvis which is new. He was evaluated by his PCP today for a follow-up visit and referred to the ED for evaluation. He denies fever, chest pain, SOB, cough, nausea, vomiting, flank pain, diarrhea, constipation, melena, hematochezia. Patient's last BM was today and normal. He reports having 1 episode of dysuria today. He denies history of abdominal surgeries and kidney stones. He took 1 percocet today without relief of his pain.           Review of patient's allergies indicates:   Allergen Reactions    Penicillins Swelling     As a child age 11     Past Medical History:   Diagnosis Date    Colon polyps 2017    repeat colonoscopy in 5 years    Hemorrhoid     Hypertension     Diagnosed @ 1 1/2 years ago and medication was prescribed - patient reports he did not take medication and does not want treatment for blood pressure    Internal hemorrhoid, bleeding     Rectal bleed      Past Surgical History:   Procedure Laterality Date    COLONOSCOPY N/A 9/6/2017    Performed by Chapin Hernandez MD at [facility]     Family History   Problem Relation Age of Onset    No Known Problems Mother     Ulcerative colitis Father 57        intestines     No Known Problems Brother      Social History     Tobacco Use     Smoking status: Former Smoker     Packs/day: 1.50     Years: 25.00     Pack years: 37.50     Types: Cigarettes     Last attempt to quit: 6/10/2018     Years since quittin.6    Smokeless tobacco: Current User     Types: Chew   Substance Use Topics    Alcohol use: Yes     Comment: every weekend - 4 drinks either beer or vodka per occasion    Drug use: No     Review of Systems   Constitutional: Negative for chills, diaphoresis and fever.   HENT: Negative for congestion, sore throat and trouble swallowing.    Eyes: Negative for pain and redness.   Respiratory: Negative for cough and shortness of breath.    Cardiovascular: Negative for chest pain.   Gastrointestinal: Positive for abdominal pain. Negative for blood in stool, constipation, diarrhea, nausea and vomiting.   Genitourinary: Positive for dysuria and testicular pain. Negative for discharge, flank pain, hematuria and scrotal swelling.   Musculoskeletal: Negative for back pain, neck pain and neck stiffness.   Skin: Negative for rash and wound.   Neurological: Negative for weakness, light-headedness and headaches.     Physical Exam     Initial Vitals [19 1108]   BP Pulse Resp Temp SpO2   133/88 92 18 98.4 °F (36.9 °C) 98 %      MAP       --         Physical Exam    Constitutional: He appears well-developed and well-nourished. He is not diaphoretic. He appears distressed.   HENT:   Head: Normocephalic and atraumatic.   Mouth/Throat: Oropharynx is clear and moist. No oropharyngeal exudate.   Eyes: EOM are normal. Pupils are equal, round, and reactive to light.   Neck: Normal range of motion. Neck supple.   Cardiovascular: Normal rate and regular rhythm.   Pulmonary/Chest: Breath sounds normal. No respiratory distress. He has no wheezes.   Abdominal: Soft. He exhibits no distension. There is tenderness (lower abdominal, diffuse).       Genitourinary: Penis normal.   Genitourinary Comments: No testicular tenderness or edema   Musculoskeletal: Normal range  of motion. He exhibits no edema or tenderness.   No midline spinal tenderness  No CVA tenderness   Neurological: He is alert and oriented to person, place, and time. He has normal strength. No cranial nerve deficit or sensory deficit.   Skin: Skin is warm and dry.       ED Course   Procedures  Labs Reviewed   CBC W/ AUTO DIFFERENTIAL - Abnormal; Notable for the following components:       Result Value    WBC 16.38 (*)     Immature Granulocytes 1.2 (*)     Gran # (ANC) 13.7 (*)     Immature Grans (Abs) 0.20 (*)     Mono # 1.3 (*)     Gran% 83.9 (*)     Lymph% 6.3 (*)     All other components within normal limits   COMPREHENSIVE METABOLIC PANEL - Abnormal; Notable for the following components:    ALT 55 (*)     All other components within normal limits   URINALYSIS, REFLEX TO URINE CULTURE - Abnormal; Notable for the following components:    Specific Gravity, UA >=1.030 (*)     Leukocytes, UA 1+ (*)     All other components within normal limits    Narrative:     Preferred Collection Type->Urine, Clean Catch   URINALYSIS MICROSCOPIC - Abnormal; Notable for the following components:    Bacteria, UA Few (*)     All other components within normal limits    Narrative:     Preferred Collection Type->Urine, Clean Catch   CULTURE, BLOOD   CULTURE, BLOOD   LIPASE   LACTIC ACID, PLASMA   ISTAT PROCEDURE   ISTAT CHEM8          Imaging Results          CT Abdomen Pelvis With Contrast (Final result)  Result time 01/28/19 13:00:54    Final result by Mathieu Middleton MD (01/28/19 13:00:54)                 Impression:      1. In this patient with known sigmoid diverticulitis.  There is increased inflammation in the region, with an increase in relatively disorganized fluid.  There is 1 small focus of possible early fluid organization/abscess measuring approximately 2.3 x 1.1 cm.  No definite air within the fluid collection.  Scattered foci of air are noted about the region of diverticulitis suggesting sequela of micro perforation.   In comparison to the previous exam, this has increased.  Continued follow-up advised.  2. Hepatic steatosis, correlation with LFTs advised.  3. Additional findings above.      Electronically signed by: Mathieu Middleton MD  Date:    01/28/2019  Time:    13:00             Narrative:    EXAMINATION:  CT ABDOMEN PELVIS WITH CONTRAST    CLINICAL HISTORY:  Diverticulitis, known, follow up;Dx with sigmoid diverticulitis 1/20 with possible microperf; lower abd pain improved initially and acutely worsened today;    TECHNIQUE:  Low dose axial images, sagittal and coronal reformations were obtained from the lung bases to the pubic symphysis following the IV administration of 100 mL of Omnipaque 350 .  Oral contrast was not given.    COMPARISON:  01/20/2019    FINDINGS:  Images of the lower thorax are remarkable for mild dependent atelectasis on the left.    The liver is mildly hypoattenuating, could reflect steatosis, correlation with LFTs recommended.  The spleen has a lobular contour, similar to the previous exam.  The pancreas, adrenal glands and gallbladder are unremarkable.  There is no biliary dilation or abdominal ascites.  The portal vein, splenic vein, SMV, celiac axis and SMA all are patent.  No significant abdominal lymphadenopathy.    The kidneys enhance symmetrically without hydronephrosis or nephrolithiasis.  The kidneys excrete contrast appropriately on delayed imaging.  The bilateral ureters are unremarkable without calculi seen.  The urinary bladder is grossly unremarkable.  The prostate is not enlarged.    There is diffuse inflammation and disorganized fluid involving the mid sigmoid colon in this patient with history of diverticulitis in the region.  In comparison to the previous examination, the degree of inflammation has somewhat increased.  There are several punctate foci of air adjacent to the region, suggesting sequela of micro perforation.  There is a somewhat linear focus of air adjacent to the right  aspect of the sigmoid colon, more prominent than on the previous exam.  On coronal imaging, there is a rounded fluid collection in the region measuring approximately 2.3 x 1.1 cm, could reflect loculated fluid, early abscess is a consideration.  Diverticula are noted along the remainder of the large bowel without additional regions of inflammation.  The terminal ileum and appendix are unremarkable.  The small bowel is grossly unremarkable.  Scattered shotty periaortic and paracaval lymph nodes are noted.    No focal osseous destructive process.  No significant inguinal lymphadenopathy.                                       APC / Resident Notes:   43 year old male with medical history of HTN, internal hemorrhoid presenting to the ED c/o abdominal pain. Dx with sigmoid diverticulitis on 1/20. Completes Cipro/Flagyl course today. Woke up this AM with acutely worsened abdominal pain. DDx includes but not limited to diverticulitis,  bowel perforation, GI obstruction, intra-abdominal abscess, mesenteric ischemia, viral gastroenteritis, pancreatitis, UTI, nephrolithiasis. Will get labs and repeat CT abd/plev. Will give analgesia, anti-emetics, and fluids.     Work-up shows WBC elevated 16, H/H 15/47, CMP unremarkable, Crt 1.0, Lipase 18, Lactate 1.2, UA negative for UTI or occult blood.     CT shows increased inflammation in the sigmoid colon, with an increase in relatively disorganized fluid.  There is 1 small focus of possible early fluid organization/abscess measuring approximately 2.3 x 1.1 cm.  No definite air within the fluid collection.  Scattered foci of air are noted about the region of diverticulitis suggesting sequela of micro perforation.  In comparison to the previous exam, this has increased.  Continued follow-up advised.    2:29 PM  Discussed patient with CRS and they will come see the patient. Will give Cefepime and Flagyl for coverage. Patient reports severe anaphylactic reaction to penicillin when he was  11 years old and pharmacy not recommending Zosyn at this time.     2:47 PM  Discussed with CRS and they will admit the patient to their service for ongoing management. CRS do not recommend giving Cefepime and would like to remain with Cipro/Flagyl at this time. Will consult ID regarding antibiotic course when inpatient. Admission orders placed to Dr. Guidry per CRS. Patient agreeable to the plan. I have discussed the care of this patient with my supervising physician.                 Clinical Impression:   The encounter diagnosis was Sigmoid diverticulitis.      Disposition:   Disposition: Admitted  Condition: Angélica Sagastume PA-C  01/28/19 2327

## 2019-01-28 NOTE — PROGRESS NOTES
Subjective:       Patient ID: Kyle Abel is a 43 y.o. male.    Chief Complaint: Hospital Follow Up and Nausea    Patient is a 43 year old white male with history of Hypertension that resolved with lifestyle modifications, history of rectal bleeding with internal hemorrhoids and colon polyps seen on colonoscopy, and a recent ER visit for Acute Diverticulitis on 1/20/2019 that is here today for hospital follow up.     Patient was seen in ER on 1/20/2019 for Acute diverticulitis.  He was put on Cipro and Flagyl po antibiotics and sent home to follow up with PCP.  Patient is here today for follow up but reports that this morning the pain is ACUTELY worse 10/10 that started in LLQ and now radiating to RLQ and down towards testicles.  He reports taking a Percocet pain pill this morning with no relief.  Patient is tender on palpation with guarding to all quadrants but worse to lower quadrants. Last bowel movement this morning was normal. Patient feeling acutely worse so will send back to ER rule out a perforation.        Current Outpatient Medications   Medication Sig Dispense Refill    ciprofloxacin HCl (CIPRO) 500 MG tablet Take 1 tablet (500 mg total) by mouth every 12 (twelve) hours. for 7 days 14 tablet 0    metroNIDAZOLE (FLAGYL) 500 MG tablet Take 1 tablet (500 mg total) by mouth every 6 (six) hours. for 7 days 28 tablet 0     No current facility-administered medications for this visit.        Past Medical History:   Diagnosis Date    Colon polyps 2017    repeat colonoscopy in 5 years    Hemorrhoid     Hypertension     Diagnosed @ 1 1/2 years ago and medication was prescribed - patient reports he did not take medication and does not want treatment for blood pressure    Internal hemorrhoid, bleeding     Rectal bleed        Past Surgical History:   Procedure Laterality Date    COLONOSCOPY N/A 9/6/2017    Performed by Chapin Hernandez MD at [facility]       Family History   Problem Relation Age of  Onset    No Known Problems Mother     Ulcerative colitis Father 57        intestines     No Known Problems Brother        Social History     Socioeconomic History    Marital status:      Spouse name: None    Number of children: None    Years of education: None    Highest education level: None   Social Needs    Financial resource strain: None    Food insecurity - worry: None    Food insecurity - inability: None    Transportation needs - medical: None    Transportation needs - non-medical: None   Occupational History    Occupation:      Employer: [email or web address]    Tobacco Use    Smoking status: Former Smoker     Packs/day: 1.50     Years: 25.00     Pack years: 37.50     Types: Cigarettes     Last attempt to quit: 6/10/2018     Years since quittin.6    Smokeless tobacco: Current User     Types: Chew   Substance and Sexual Activity    Alcohol use: Yes     Comment: every weekend - 4 drinks either beer or vodka per occasion    Drug use: No    Sexual activity: None   Other Topics Concern    None   Social History Narrative    None       Review of Systems   Constitutional: Negative for activity change, appetite change, fatigue, fever and unexpected weight change.   HENT: Negative for congestion, ear pain, mouth sores, nosebleeds, postnasal drip, rhinorrhea, sinus pressure, sneezing, sore throat, trouble swallowing and voice change.    Eyes: Negative.    Respiratory: Negative for cough, chest tightness and shortness of breath.    Cardiovascular: Negative for chest pain, palpitations and leg swelling.   Gastrointestinal: Positive for abdominal distention, abdominal pain and nausea. Negative for blood in stool, constipation, diarrhea, rectal pain and vomiting.   Endocrine: Negative.    Genitourinary: Negative for dysuria, flank pain, hematuria and urgency.   Musculoskeletal: Negative for arthralgias, back pain, gait problem, joint swelling, myalgias and neck pain.   Skin: Negative  "for color change, rash and wound.   Allergic/Immunologic: Negative for immunocompromised state.   Neurological: Negative for dizziness, tremors, seizures, syncope, speech difficulty and headaches.   Hematological: Negative for adenopathy. Does not bruise/bleed easily.   Psychiatric/Behavioral: Negative for behavioral problems, dysphoric mood, sleep disturbance and suicidal ideas. The patient is not nervous/anxious.          Objective:     Vitals:    01/28/19 0946   BP: 124/78   BP Location: Left arm   Patient Position: Sitting   BP Method: Large (Manual)   Pulse: 85   Resp: 18   Temp: 98.4 °F (36.9 °C)   TempSrc: Oral   SpO2: 97%   Weight: 130.4 kg (287 lb 9.4 oz)   Height: 5' 10" (1.778 m)          Physical Exam   Constitutional: He appears distressed.   + obesity with Body mass index is 41.26 kg/m². Acute abdominal pains     HENT:   Head: Normocephalic and atraumatic.   Eyes: EOM are normal. Pupils are equal, round, and reactive to light.   Neck: Normal range of motion. Neck supple. No JVD present.   Cardiovascular: Normal rate and regular rhythm.   Pulmonary/Chest: Effort normal and breath sounds normal. No respiratory distress.   Abdominal: Bowel sounds are normal. There is tenderness in the right lower quadrant, suprapubic area and left lower quadrant. There is rebound and guarding.       Acute tenderness with guarding and rebound tenderness present to lower quandrants.           Assessment:         ICD-10-CM ICD-9-CM   1. Acute bilateral lower abdominal pain R10.31 789.03    R10.32 789.04     338.19   2. Diverticulitis K57.92 562.11       Plan:       Acute bilateral lower abdominal pain  -  Patient is acutely tender with guarding to bilateral lower quadrants.  Known diverticulitis on 1/20/2019 and on last day of antibiotics - reports was feeling better the last 2 days but acute pain that started this morning - very tender to touch and percocet did not touch the pain - discussed ordering outpatient CT to rule " out perforation, etc but patient in such acute pain in office - will send patient back to ER for management/evaluation - patient's father is here to drive patient to ER.    Diverticulitis              Medication List           Accurate as of 1/28/19 10:42 AM. If you have any questions, ask your nurse or doctor.               CONTINUE taking these medications    ciprofloxacin HCl 500 MG tablet  Commonly known as:  CIPRO  Take 1 tablet (500 mg total) by mouth every 12 (twelve) hours. for 7 days     metroNIDAZOLE 500 MG tablet  Commonly known as:  FLAGYL  Take 1 tablet (500 mg total) by mouth every 6 (six) hours. for 7 days

## 2019-01-28 NOTE — ED TRIAGE NOTES
Pt reports lower abd pain that radiates towards pelvic area; reports pain increased when waking up this morningWas seen last Sunday for the same symptoms; reports recent dx of diverticulitis; pt endorses nausea, denies vomiting; denies change in stools; pt denies fevers; abd soft, tender upon palpation    Pt reports taking one percocet and one tylenol

## 2019-01-28 NOTE — ED NOTES
LOC: The patient is awake, alert, and aware of environment. The patient is oriented x 3 and speaking appropriately.   APPEARANCE: No acute distress noted.   PSYCHOSOCIAL: Patient is calm and cooperative.   SKIN: The skin is warm, dry.   RESPIRATORY: Airway is open and patent. Bilateral chest rise and fall. Respirations are spontaneous, even and unlabored. Normal effort and rate noted. No accessory muscle use noted.   CARDIAC: Patient has a normal rate and rhythm. Denies chest pain or SOB.   ABDOMEN: Soft, lower abd tenderness upon palpation. No distention noted.   URINARY:  Voids independently.   EXTREMITIES: WNL  NEUROLOGIC: Eyes open spontaneously. Speech clear. Tolerating saliva secretions well. Able to follow commands, demonstrating ability to actively and appropriately communicate within context of current conversation. Symmetrical facial muscles. Moving all extremities well. Movement is purposeful.   MUSCULOSKELETAL: No obvious deformities noted.

## 2019-01-28 NOTE — H&P
[facility]  Colorectal Surgery  History & Physical    Patient Name: Kyle Abel  MRN: [id number]  Admission Date: 1/28/2019  Attending Physician: Dr. Guidry  Primary Care Provider: Gilma Cuba NP    Subjective:     Chief Complaint/Reason for Admission: diverticulitis    History of Present Illness:  43 year old male, hx HTN presented to [facility] ER 1/2019 with abd pain, ct proven sigmoid diverticulitis, treated as outpt with cipro/flagy for one week.  Per pt at first he felt better but then yesterday and today his abd pain increased, had several small stools and come to ER due to abd pain. Denies fever at home, no nausea  CT 1/28/19:     In this patient with known sigmoid diverticulitis.  There is increased inflammation in the region, with an increase in relatively disorganized fluid.  There is 1 small focus of possible early fluid organization/abscess measuring approximately 2.3 x 1.1 cm.  No definite air within the fluid collection.  Scattered foci of air are noted about the region of diverticulitis suggesting sequela of micro perforation.  In comparison to the previous exam, this has increased.  Continued follow-up advised.  In Er he has required several doses of IV dilaudid for abd pain  Before last week this is only episode of diverticulitis  Colonoscopy 2017 8mm ascending adenoma polyp removed and 4mm hyperplastic polyp removed from sigmoid, internal hemorrhoids  Had RBL done 1/15/19 by Dr. Lawrence  No hx of colon or rectal surgery  Family hx (father) of UC       (Not in a hospital admission)    Review of patient's allergies indicates:   Allergen Reactions    Penicillins Anaphylaxis and Swelling     As a child age 11       Past Medical History:   Diagnosis Date    Colon polyps 2017    repeat colonoscopy in 5 years    Hemorrhoid     Hypertension     Diagnosed @ 1 1/2 years ago and medication was prescribed - patient reports he did not take medication and does not want treatment for  blood pressure    Internal hemorrhoid, bleeding     Rectal bleed      Past Surgical History:   Procedure Laterality Date    COLONOSCOPY N/A 2017    Performed by Chapin Hernandez MD at [facility]     Family History     Problem Relation (Age of Onset)    No Known Problems Mother, Brother    Ulcerative colitis Father (57)        Tobacco Use    Smoking status: Former Smoker     Packs/day: 1.50     Years: 25.00     Pack years: 37.50     Types: Cigarettes     Last attempt to quit: 6/10/2018     Years since quittin.6    Smokeless tobacco: Current User     Types: Chew   Substance and Sexual Activity    Alcohol use: Yes     Comment: every weekend - 4 drinks either beer or vodka per occasion    Drug use: No    Sexual activity: Yes     Review of Systems   Constitutional: Positive for appetite change. Negative for activity change, chills, fatigue and fever.   Respiratory: Negative for cough, chest tightness, shortness of breath and wheezing.    Cardiovascular: Negative for chest pain and leg swelling.   Gastrointestinal: Positive for abdominal pain. Negative for abdominal distention, anal bleeding, blood in stool, constipation, diarrhea, nausea, rectal pain and vomiting.   Genitourinary: Negative for difficulty urinating, enuresis, flank pain, frequency, genital sores and hematuria.   Musculoskeletal: Negative for back pain, gait problem and joint swelling.   Skin: Negative.  Negative for color change.   Neurological: Negative for dizziness, syncope and numbness.   Psychiatric/Behavioral: Negative for agitation, confusion, decreased concentration, dysphoric mood and hallucinations. The patient is not nervous/anxious and is not hyperactive.      Objective:     Vital Signs (Most Recent):  Temp: 98.9 °F (37.2 °C) (19 1509)  Pulse: 100 (19 1509)  Resp: 20 (19 1509)  BP: 129/75 (19 1509)  SpO2: 95 % (19 1509) Vital Signs (24h Range):  Temp:  [98.4 °F (36.9 °C)-100.9 °F (38.3 °C)]  98.9 °F (37.2 °C)  Pulse:  [] 100  Resp:  [18-20] 20  SpO2:  [95 %-100 %] 95 %  BP: (124-133)/(75-88) 129/75     Weight: 126.1 kg (278 lb)  Body mass index is 39.89 kg/m².    Physical Exam   Constitutional: He is oriented to person, place, and time. He appears well-developed and well-nourished. No distress.   Cardiovascular: Normal rate, regular rhythm and normal heart sounds.   Pulmonary/Chest: Effort normal and breath sounds normal. No respiratory distress. He has no wheezes. He has no rales.   Abdominal: Soft. He exhibits no distension and no mass. There is tenderness. There is no guarding.   Pain with deep palpation in RUQ and LLQ, no signs of peritonitis    Musculoskeletal: Normal range of motion.   Neurological: He is alert and oriented to person, place, and time.   Skin: Skin is warm and dry.   Psychiatric: He has a normal mood and affect. His behavior is normal. Judgment and thought content normal.   Nursing note and vitals reviewed.        Significant Labs:  BMP (Last 3 Results):   Recent Labs   Lab 01/28/19  1151         K 4.2      CO2 24   BUN 14   CREATININE 1.0   CALCIUM 9.7     CBC (Last 3 Results):   Recent Labs   Lab 01/28/19  1151 01/28/19  1159   WBC 16.38*  --    RBC 5.33  --    HGB 15.3  --    HCT 47.8 45     --    MCV 90  --    MCH 28.7  --    MCHC 32.0  --      Lactic acid:  1.2  Significant Diagnostics:  CT: I have reviewed all pertinent results/findings within the past 24 hours:  ct reviewed by Dr. sykes    Assessment/Plan:     Sigmoid diverticulitis    43 year old male failed outpt treatment for diverticulitis    -admit to Dr. sykes  -npo  -may have ice sparingly  -begin IB cipro/flagyl  -will consult ID due to severe allergy to PCN  -seerial abd exams  -daily labs  -pt and family agree with plans              Lorenza Reynolds NP  Colorectal Surgery  [facility]

## 2019-01-28 NOTE — HPI
43 year old male, hx HTN presented to [place] ER 1/2019 with abd pain, ct proven sigmoid diverticulitis, treated as outpt with cipro/flagy for one week.  Per pt at first he felt better but then yesterday and today his abd pain increased, had several small stools and come to ER due to abd pain. Denies fever at home, no nausea  CT 1/28/19:     In this patient with known sigmoid diverticulitis.  There is increased inflammation in the region, with an increase in relatively disorganized fluid.  There is 1 small focus of possible early fluid organization/abscess measuring approximately 2.3 x 1.1 cm.  No definite air within the fluid collection.  Scattered foci of air are noted about the region of diverticulitis suggesting sequela of micro perforation.  In comparison to the previous exam, this has increased.  Continued follow-up advised.  In Er he has required several doses of IV dilaudid for abd pain  Before last week this is only episode of diverticulitis  Colonoscopy 2017 8mm ascending adenoma polyp removed and 4mm hyperplastic polyp removed from sigmoid, internal hemorrhoids  Had RBL done 1/15/19 by Dr. Lawrence  No hx of colon or rectal surgery  Family hx (father) of UC

## 2019-01-28 NOTE — SUBJECTIVE & OBJECTIVE
(Not in a hospital admission)    Review of patient's allergies indicates:   Allergen Reactions    Penicillins Anaphylaxis and Swelling     As a child age 11       Past Medical History:   Diagnosis Date    Colon polyps 2017    repeat colonoscopy in 5 years    Hemorrhoid     Hypertension     Diagnosed @ 1 1/2 years ago and medication was prescribed - patient reports he did not take medication and does not want treatment for blood pressure    Internal hemorrhoid, bleeding     Rectal bleed      Past Surgical History:   Procedure Laterality Date    COLONOSCOPY N/A 2017    Performed by Chapin Hernandez MD at [facility]     Family History     Problem Relation (Age of Onset)    No Known Problems Mother, Brother    Ulcerative colitis Father (57)        Tobacco Use    Smoking status: Former Smoker     Packs/day: 1.50     Years: 25.00     Pack years: 37.50     Types: Cigarettes     Last attempt to quit: 6/10/2018     Years since quittin.6    Smokeless tobacco: Current User     Types: Chew   Substance and Sexual Activity    Alcohol use: Yes     Comment: every weekend - 4 drinks either beer or vodka per occasion    Drug use: No    Sexual activity: Yes     Review of Systems   Constitutional: Positive for appetite change. Negative for activity change, chills, fatigue and fever.   Respiratory: Negative for cough, chest tightness, shortness of breath and wheezing.    Cardiovascular: Negative for chest pain and leg swelling.   Gastrointestinal: Positive for abdominal pain. Negative for abdominal distention, anal bleeding, blood in stool, constipation, diarrhea, nausea, rectal pain and vomiting.   Genitourinary: Negative for difficulty urinating, enuresis, flank pain, frequency, genital sores and hematuria.   Musculoskeletal: Negative for back pain, gait problem and joint swelling.   Skin: Negative.  Negative for color change.   Neurological: Negative for dizziness, syncope and numbness.    Psychiatric/Behavioral: Negative for agitation, confusion, decreased concentration, dysphoric mood and hallucinations. The patient is not nervous/anxious and is not hyperactive.      Objective:     Vital Signs (Most Recent):  Temp: 98.9 °F (37.2 °C) (01/28/19 1509)  Pulse: 100 (01/28/19 1509)  Resp: 20 (01/28/19 1509)  BP: 129/75 (01/28/19 1509)  SpO2: 95 % (01/28/19 1509) Vital Signs (24h Range):  Temp:  [98.4 °F (36.9 °C)-100.9 °F (38.3 °C)] 98.9 °F (37.2 °C)  Pulse:  [] 100  Resp:  [18-20] 20  SpO2:  [95 %-100 %] 95 %  BP: (124-133)/(75-88) 129/75     Weight: 126.1 kg (278 lb)  Body mass index is 39.89 kg/m².    Physical Exam   Constitutional: He is oriented to person, place, and time. He appears well-developed and well-nourished. No distress.   Cardiovascular: Normal rate, regular rhythm and normal heart sounds.   Pulmonary/Chest: Effort normal and breath sounds normal. No respiratory distress. He has no wheezes. He has no rales.   Abdominal: Soft. He exhibits no distension and no mass. There is tenderness. There is no guarding.   Pain with deep palpation in RUQ and LLQ, no signs of peritonitis    Musculoskeletal: Normal range of motion.   Neurological: He is alert and oriented to person, place, and time.   Skin: Skin is warm and dry.   Psychiatric: He has a normal mood and affect. His behavior is normal. Judgment and thought content normal.   Nursing note and vitals reviewed.        Significant Labs:  BMP (Last 3 Results):   Recent Labs   Lab 01/28/19  1151         K 4.2      CO2 24   BUN 14   CREATININE 1.0   CALCIUM 9.7     CBC (Last 3 Results):   Recent Labs   Lab 01/28/19  1151 01/28/19  1159   WBC 16.38*  --    RBC 5.33  --    HGB 15.3  --    HCT 47.8 45     --    MCV 90  --    MCH 28.7  --    MCHC 32.0  --      Lactic acid:  1.2  Significant Diagnostics:  CT: I have reviewed all pertinent results/findings within the past 24 hours:  ct reviewed by Dr. sykes

## 2019-01-29 LAB
ANION GAP SERPL CALC-SCNC: 8 MMOL/L
BASOPHILS # BLD AUTO: 0.03 K/UL
BASOPHILS NFR BLD: 0.2 %
BUN SERPL-MCNC: 9 MG/DL
CALCIUM SERPL-MCNC: 9.2 MG/DL
CHLORIDE SERPL-SCNC: 105 MMOL/L
CO2 SERPL-SCNC: 23 MMOL/L
CREAT SERPL-MCNC: 0.9 MG/DL
CRP SERPL-MCNC: 124.5 MG/L
DIFFERENTIAL METHOD: ABNORMAL
EOSINOPHIL # BLD AUTO: 0 K/UL
EOSINOPHIL NFR BLD: 0.2 %
ERYTHROCYTE [DISTWIDTH] IN BLOOD BY AUTOMATED COUNT: 14.6 %
EST. GFR  (AFRICAN AMERICAN): >60 ML/MIN/1.73 M^2
EST. GFR  (NON AFRICAN AMERICAN): >60 ML/MIN/1.73 M^2
GLUCOSE SERPL-MCNC: 105 MG/DL
HCT VFR BLD AUTO: 43.7 %
HGB BLD-MCNC: 14 G/DL
IMM GRANULOCYTES # BLD AUTO: 0.23 K/UL
IMM GRANULOCYTES NFR BLD AUTO: 1.4 %
LYMPHOCYTES # BLD AUTO: 0.8 K/UL
LYMPHOCYTES NFR BLD: 4.8 %
MAGNESIUM SERPL-MCNC: 2.2 MG/DL
MCH RBC QN AUTO: 28.1 PG
MCHC RBC AUTO-ENTMCNC: 32 G/DL
MCV RBC AUTO: 88 FL
MONOCYTES # BLD AUTO: 1.1 K/UL
MONOCYTES NFR BLD: 7 %
NEUTROPHILS # BLD AUTO: 13.9 K/UL
NEUTROPHILS NFR BLD: 86.4 %
NRBC BLD-RTO: 0 /100 WBC
PHOSPHATE SERPL-MCNC: 2.6 MG/DL
PLATELET # BLD AUTO: 252 K/UL
PMV BLD AUTO: 11.5 FL
POTASSIUM SERPL-SCNC: 4.2 MMOL/L
RBC # BLD AUTO: 4.99 M/UL
SODIUM SERPL-SCNC: 136 MMOL/L
WBC # BLD AUTO: 16.13 K/UL

## 2019-01-29 PROCEDURE — 86140 C-REACTIVE PROTEIN: CPT

## 2019-01-29 PROCEDURE — 20600001 HC STEP DOWN PRIVATE ROOM

## 2019-01-29 PROCEDURE — 99255 PR INITIAL INPATIENT CONSULT,LEVL V: ICD-10-PCS | Mod: ,,, | Performed by: INTERNAL MEDICINE

## 2019-01-29 PROCEDURE — S0030 INJECTION, METRONIDAZOLE: HCPCS | Performed by: NURSE PRACTITIONER

## 2019-01-29 PROCEDURE — 36415 COLL VENOUS BLD VENIPUNCTURE: CPT

## 2019-01-29 PROCEDURE — 25000003 PHARM REV CODE 250: Performed by: NURSE PRACTITIONER

## 2019-01-29 PROCEDURE — 25000003 PHARM REV CODE 250: Performed by: INTERNAL MEDICINE

## 2019-01-29 PROCEDURE — 84100 ASSAY OF PHOSPHORUS: CPT

## 2019-01-29 PROCEDURE — S0073 INJECTION, AZTREONAM, 500 MG: HCPCS | Performed by: NURSE PRACTITIONER

## 2019-01-29 PROCEDURE — 99255 IP/OBS CONSLTJ NEW/EST HI 80: CPT | Mod: ,,, | Performed by: INTERNAL MEDICINE

## 2019-01-29 PROCEDURE — 63600175 PHARM REV CODE 636 W HCPCS: Performed by: STUDENT IN AN ORGANIZED HEALTH CARE EDUCATION/TRAINING PROGRAM

## 2019-01-29 PROCEDURE — S0073 INJECTION, AZTREONAM, 500 MG: HCPCS | Performed by: INTERNAL MEDICINE

## 2019-01-29 PROCEDURE — A4216 STERILE WATER/SALINE, 10 ML: HCPCS | Performed by: NURSE PRACTITIONER

## 2019-01-29 PROCEDURE — 25000003 PHARM REV CODE 250: Performed by: STUDENT IN AN ORGANIZED HEALTH CARE EDUCATION/TRAINING PROGRAM

## 2019-01-29 PROCEDURE — 85025 COMPLETE CBC W/AUTO DIFF WBC: CPT

## 2019-01-29 PROCEDURE — 83735 ASSAY OF MAGNESIUM: CPT

## 2019-01-29 PROCEDURE — 63600175 PHARM REV CODE 636 W HCPCS: Performed by: NURSE PRACTITIONER

## 2019-01-29 PROCEDURE — 80048 BASIC METABOLIC PNL TOTAL CA: CPT

## 2019-01-29 RX ORDER — HYDROMORPHONE HYDROCHLORIDE 1 MG/ML
1 INJECTION, SOLUTION INTRAMUSCULAR; INTRAVENOUS; SUBCUTANEOUS
Status: DISCONTINUED | OUTPATIENT
Start: 2019-01-29 | End: 2019-02-01

## 2019-01-29 RX ORDER — CIPROFLOXACIN 2 MG/ML
400 INJECTION, SOLUTION INTRAVENOUS
Status: DISCONTINUED | OUTPATIENT
Start: 2019-01-29 | End: 2019-01-29

## 2019-01-29 RX ORDER — AZTREONAM 2 G/1
2000 INJECTION, POWDER, LYOPHILIZED, FOR SOLUTION INTRAMUSCULAR; INTRAVENOUS
Status: DISCONTINUED | OUTPATIENT
Start: 2019-01-29 | End: 2019-02-04

## 2019-01-29 RX ORDER — METRONIDAZOLE 500 MG/100ML
500 INJECTION, SOLUTION INTRAVENOUS
Status: DISCONTINUED | OUTPATIENT
Start: 2019-01-29 | End: 2019-02-04

## 2019-01-29 RX ORDER — AZTREONAM 1 G/1
1000 INJECTION, POWDER, LYOPHILIZED, FOR SOLUTION INTRAMUSCULAR; INTRAVENOUS
Status: DISCONTINUED | OUTPATIENT
Start: 2019-01-29 | End: 2019-01-29

## 2019-01-29 RX ORDER — VANCOMYCIN HCL IN 5 % DEXTROSE 1.5G/250ML
1500 PLASTIC BAG, INJECTION (ML) INTRAVENOUS
Status: DISCONTINUED | OUTPATIENT
Start: 2019-01-29 | End: 2019-01-31

## 2019-01-29 RX ORDER — DEXTROSE MONOHYDRATE, SODIUM CHLORIDE, AND POTASSIUM CHLORIDE 50; 1.49; 4.5 G/1000ML; G/1000ML; G/1000ML
INJECTION, SOLUTION INTRAVENOUS CONTINUOUS
Status: DISCONTINUED | OUTPATIENT
Start: 2019-01-29 | End: 2019-01-30

## 2019-01-29 RX ADMIN — ACETAMINOPHEN 1000 MG: 10 INJECTION, SOLUTION INTRAVENOUS at 01:01

## 2019-01-29 RX ADMIN — HYDROMORPHONE HYDROCHLORIDE 1 MG: 1 INJECTION, SOLUTION INTRAMUSCULAR; INTRAVENOUS; SUBCUTANEOUS at 02:01

## 2019-01-29 RX ADMIN — METRONIDAZOLE 500 MG: 500 INJECTION, SOLUTION INTRAVENOUS at 11:01

## 2019-01-29 RX ADMIN — Medication 3 ML: at 10:01

## 2019-01-29 RX ADMIN — METRONIDAZOLE 500 MG: 500 INJECTION, SOLUTION INTRAVENOUS at 08:01

## 2019-01-29 RX ADMIN — VANCOMYCIN HYDROCHLORIDE 1500 MG: 100 INJECTION, POWDER, LYOPHILIZED, FOR SOLUTION INTRAVENOUS at 09:01

## 2019-01-29 RX ADMIN — HYDROMORPHONE HYDROCHLORIDE 1 MG: 1 INJECTION, SOLUTION INTRAMUSCULAR; INTRAVENOUS; SUBCUTANEOUS at 10:01

## 2019-01-29 RX ADMIN — ONDANSETRON 4 MG: 2 INJECTION INTRAMUSCULAR; INTRAVENOUS at 11:01

## 2019-01-29 RX ADMIN — AZTREONAM 1000 MG: 1 INJECTION, POWDER, FOR SOLUTION INTRAMUSCULAR; INTRAVENOUS at 10:01

## 2019-01-29 RX ADMIN — ONDANSETRON 4 MG: 2 INJECTION INTRAMUSCULAR; INTRAVENOUS at 02:01

## 2019-01-29 RX ADMIN — HYDROMORPHONE HYDROCHLORIDE 1 MG: 1 INJECTION, SOLUTION INTRAMUSCULAR; INTRAVENOUS; SUBCUTANEOUS at 09:01

## 2019-01-29 RX ADMIN — ACETAMINOPHEN 1000 MG: 10 INJECTION, SOLUTION INTRAVENOUS at 05:01

## 2019-01-29 RX ADMIN — HYDROMORPHONE HYDROCHLORIDE 1 MG: 1 INJECTION, SOLUTION INTRAMUSCULAR; INTRAVENOUS; SUBCUTANEOUS at 06:01

## 2019-01-29 RX ADMIN — METRONIDAZOLE 500 MG: 500 INJECTION, SOLUTION INTRAVENOUS at 04:01

## 2019-01-29 RX ADMIN — HYDROMORPHONE HYDROCHLORIDE 1 MG: 1 INJECTION, SOLUTION INTRAMUSCULAR; INTRAVENOUS; SUBCUTANEOUS at 04:01

## 2019-01-29 RX ADMIN — Medication 3 ML: at 06:01

## 2019-01-29 RX ADMIN — PROMETHAZINE HYDROCHLORIDE 6.25 MG: 25 INJECTION INTRAMUSCULAR; INTRAVENOUS at 02:01

## 2019-01-29 RX ADMIN — ONDANSETRON 4 MG: 2 INJECTION INTRAMUSCULAR; INTRAVENOUS at 10:01

## 2019-01-29 RX ADMIN — AZTREONAM 2000 MG: 2 INJECTION, POWDER, LYOPHILIZED, FOR SOLUTION INTRAMUSCULAR; INTRAVENOUS at 05:01

## 2019-01-29 RX ADMIN — ONDANSETRON 4 MG: 2 INJECTION INTRAMUSCULAR; INTRAVENOUS at 04:01

## 2019-01-29 RX ADMIN — POTASSIUM CHLORIDE, DEXTROSE MONOHYDRATE AND SODIUM CHLORIDE: 150; 5; 450 INJECTION, SOLUTION INTRAVENOUS at 01:01

## 2019-01-29 RX ADMIN — PROMETHAZINE HYDROCHLORIDE 6.25 MG: 25 INJECTION INTRAMUSCULAR; INTRAVENOUS at 09:01

## 2019-01-29 RX ADMIN — HYDROMORPHONE HYDROCHLORIDE 1 MG: 1 INJECTION, SOLUTION INTRAMUSCULAR; INTRAVENOUS; SUBCUTANEOUS at 11:01

## 2019-01-29 RX ADMIN — Medication 3 ML: at 01:01

## 2019-01-29 NOTE — ASSESSMENT & PLAN NOTE
43 year old male failed outpt treatment for diverticulitis    -  -npo  -may have ice sparingly  -per recs from ID, vanc, azto and bree  -l consult ID due to severe allergy to PCN  -seerial abd exams  -daily labs  -pt and family agree with plans

## 2019-01-29 NOTE — ED NOTES
Report and care received from ZOILA Hall. Pt resting comfortably in stretcher with bed locked in low position with side rails up x2. Respirations even and unlabored with visible chest rise noted. Pt offered bathroom assistance and denies needs at this time. Pt updated on POC, verbalizes understanding. Pt instructed to call for assistance, call bell within reach. Pt on continuous BP and O2 monitoring. Will continue to monitor.    Patient Identifiers for Kyle Abel checked and correct  LOC: The patient is awake, alert and aware of environment with an appropriate affect, the patient is oriented x 3 and speaking appropriate.   APPEARANCE: Patient appears to be in some pain but in no acute distress, patient is clean and well groomed, patient's clothing is properly fastened.  SKIN: The skin is warm and dry, patient has normal skin turgor and moist mucus membranes,no rashes or lesions.Skin Intact , No Breakdown Noted  Musculoskeletal :  Normal range of motion noted. Moves all extremeties well, No swelling or tenderness noted  RESPIRATORY: Airway is open and patent, respirations are spontaneous, patient has a normal effort and rate.  CARDIAC: Patient has a normal rate and rhythm, no periphreal edema noted, capillary refill < 3 seconds.   ABDOMEN: Soft and non tender to palpation, no distention noted.  PULSES: 2+  And symmetrical in all extremeties  NEUROLOGIC: PERRL. facial expression is symmetrical, patient moving all extremities, normal sensation in all extremities when touched with a finger.The patient is awake, alert and cooperative with a calm affect, patient is aware of environment.    Will continue to monitor

## 2019-01-29 NOTE — ASSESSMENT & PLAN NOTE
"- CT shows increased inflammation in the sigmoid region, cocnern for fluid organization/abscess formation and microperforation  - No improvement with outpatient course of PO cipro and flagyl   - ID consulted for abx recs in pt with severe PCN allergy  - Pt unsure if ever exposed to Cephalosporins  - "Failed" outpatient therapy with Cipro and Flagyl likely due to lack of source control      Plan  1. Continue Aztreonam (increase dose to 2g q8), Flagyl and Vanc  2. Will avoid PCNs and any cross reactive antibiotics (Zosyn, Cephalosporins etc) and will treat outpatient management as failed    3. Can consider Minocycline if pt does not improve  4. Pt with likely abscess and microperforations will need source control for resolution of infection. Defer to primary surgical team.  5. Will follow blood cultures  "

## 2019-01-29 NOTE — PROGRESS NOTES
[facility]  Colorectal Surgery  Progress Note    Patient Name: Kyle Abel  MRN: [id number]  Admission Date: 1/28/2019  Hospital Length of Stay: 1 days  Attending Physician: Samir Guidry MD    Subjective:     Interval History: no acute events overnite, still significant abd pain    Post-Op Info:  * No surgery found *          Medications:  Continuous Infusions:   sodium chloride 0.9% 100 mL/hr at 01/28/19 2210     Scheduled Meds:   acetaminophen  1,000 mg Intravenous Q8H    aztreonam  1,000 mg Intravenous Q8H    metronidazole  500 mg Intravenous Q8H    sodium chloride 0.9%  3 mL Intravenous Q8H    vancomycin (VANCOCIN) IVPB  1,500 mg Intravenous Q12H     PRN Meds:   HYDROmorphone    naloxone    ondansetron    sodium chloride 0.9%        Objective:     Vital Signs (Most Recent):  Temp: 98.8 °F (37.1 °C) (01/29/19 0830)  Pulse: 84 (01/29/19 0830)  Resp: 17 (01/29/19 0830)  BP: 117/71 (01/29/19 0830)  SpO2: (!) 94 % (01/29/19 0830) Vital Signs (24h Range):  Temp:  [98.5 °F (36.9 °C)-100.9 °F (38.3 °C)] 98.8 °F (37.1 °C)  Pulse:  [] 84  Resp:  [14-20] 17  SpO2:  [94 %-100 %] 94 %  BP: (113-132)/(65-78) 117/71     Intake/Output - Last 3 Shifts       01/27 0700 - 01/28 0659 01/28 0700 - 01/29 0659 01/29 0700 - 01/30 0659    I.V. (mL/kg)  715 (5.7)     IV Piggyback  1500     Total Intake(mL/kg)  2215 (17.6)     Urine (mL/kg/hr)  150     Total Output  150     Net  +2065            Urine Occurrence  3 x 2 x          Physical Exam   Constitutional: He is oriented to person, place, and time. He appears well-developed and well-nourished.   Cardiovascular: Normal rate, regular rhythm and normal heart sounds.   Pulmonary/Chest: Effort normal and breath sounds normal. No respiratory distress. He has no wheezes. He has no rales.   Abdominal: Soft. He exhibits no distension and no mass. There is tenderness. There is no guarding.   Pain in RUQ and LLQ   Musculoskeletal: Normal range of  motion.   Neurological: He is alert and oriented to person, place, and time.   Skin: Skin is warm and dry.   Psychiatric: He has a normal mood and affect. His behavior is normal. Judgment and thought content normal.   Nursing note and vitals reviewed.        Significant Labs:  BMP (Last 3 Results):   Recent Labs   Lab 01/28/19  1151 01/29/19  0454    105    136   K 4.2 4.2    105   CO2 24 23   BUN 14 9   CREATININE 1.0 0.9   CALCIUM 9.7 9.2   MG  --  2.2     CBC (Last 3 Results):   Recent Labs   Lab 01/28/19  1151 01/28/19  1159 01/29/19  0455   WBC 16.38*  --  16.13*   RBC 5.33  --  4.99   HGB 15.3  --  14.0   HCT 47.8 45 43.7     --  252   MCV 90  --  88   MCH 28.7  --  28.1   MCHC 32.0  --  32.0       Significant Diagnostics:  None    Assessment/Plan:     * Sigmoid diverticulitis    43 year old male failed outpt treatment for diverticulitis    -  -npo  -may have ice sparingly  -per recs from ID, vanc, azto and flagy  -l consult ID due to severe allergy to PCN  -seerial abd exams  -daily labs  -pt and family agree with plans              Lorenza Reynolds NP  Colorectal Surgery  [facility]

## 2019-01-29 NOTE — SUBJECTIVE & OBJECTIVE
Subjective:     Interval History: no acute events overnite, still significant abd pain    Post-Op Info:  * No surgery found *          Medications:  Continuous Infusions:   sodium chloride 0.9% 100 mL/hr at 01/28/19 2210     Scheduled Meds:   acetaminophen  1,000 mg Intravenous Q8H    aztreonam  1,000 mg Intravenous Q8H    metronidazole  500 mg Intravenous Q8H    sodium chloride 0.9%  3 mL Intravenous Q8H    vancomycin (VANCOCIN) IVPB  1,500 mg Intravenous Q12H     PRN Meds:   HYDROmorphone    naloxone    ondansetron    sodium chloride 0.9%        Objective:     Vital Signs (Most Recent):  Temp: 98.8 °F (37.1 °C) (01/29/19 0830)  Pulse: 84 (01/29/19 0830)  Resp: 17 (01/29/19 0830)  BP: 117/71 (01/29/19 0830)  SpO2: (!) 94 % (01/29/19 0830) Vital Signs (24h Range):  Temp:  [98.5 °F (36.9 °C)-100.9 °F (38.3 °C)] 98.8 °F (37.1 °C)  Pulse:  [] 84  Resp:  [14-20] 17  SpO2:  [94 %-100 %] 94 %  BP: (113-132)/(65-78) 117/71     Intake/Output - Last 3 Shifts       01/27 0700 - 01/28 0659 01/28 0700 - 01/29 0659 01/29 0700 - 01/30 0659    I.V. (mL/kg)  715 (5.7)     IV Piggyback  1500     Total Intake(mL/kg)  2215 (17.6)     Urine (mL/kg/hr)  150     Total Output  150     Net  +2065            Urine Occurrence  3 x 2 x          Physical Exam   Constitutional: He is oriented to person, place, and time. He appears well-developed and well-nourished.   Cardiovascular: Normal rate, regular rhythm and normal heart sounds.   Pulmonary/Chest: Effort normal and breath sounds normal. No respiratory distress. He has no wheezes. He has no rales.   Abdominal: Soft. He exhibits no distension and no mass. There is tenderness. There is no guarding.   Pain in RUQ and LLQ   Musculoskeletal: Normal range of motion.   Neurological: He is alert and oriented to person, place, and time.   Skin: Skin is warm and dry.   Psychiatric: He has a normal mood and affect. His behavior is normal. Judgment and thought content normal.    Nursing note and vitals reviewed.        Significant Labs:  BMP (Last 3 Results):   Recent Labs   Lab 01/28/19  1151 01/29/19  0454    105    136   K 4.2 4.2    105   CO2 24 23   BUN 14 9   CREATININE 1.0 0.9   CALCIUM 9.7 9.2   MG  --  2.2     CBC (Last 3 Results):   Recent Labs   Lab 01/28/19  1151 01/28/19  1159 01/29/19  0455   WBC 16.38*  --  16.13*   RBC 5.33  --  4.99   HGB 15.3  --  14.0   HCT 47.8 45 43.7     --  252   MCV 90  --  88   MCH 28.7  --  28.1   MCHC 32.0  --  32.0       Significant Diagnostics:  None

## 2019-01-29 NOTE — CONSULTS
"[facility]  Infectious Disease  Consult Note    Patient Name: Kyle Abel  MRN: [id number]  Admission Date: 1/28/2019  Hospital Length of Stay: 1 days  Attending Physician: Samir Guidry MD  Primary Care Provider: Gilma Cuba NP     Isolation Status: No active isolations    Patient information was obtained from patient, past medical records and ER records.      Consults  Assessment/Plan:     * Sigmoid diverticulitis    - CT shows increased inflammation in the sigmoid region, cocnern for fluid organization/abscess formation and microperforation  - No improvement with outpatient course of PO cipro and flagyl   - ID consulted for abx recs in pt with severe PCN allergy  - Pt unsure if ever exposed to Cephalosporins  - "Failed" outpatient therapy with Cipro and Flagyl likely due to lack of source control      Plan  1. Continue Aztreonam (increase dose to 2g q8), Flagyl and Vanc  2. Will avoid PCNs and any cross reactive antibiotics (Zosyn, Cephalosporins etc) and will treat outpatient management as failed    3. Can consider Minocycline if pt does not improve  4. Pt with likely abscess and microperforations will need source control for resolution of infection. Defer to primary surgical team.  5. Will follow blood cultures         Thank you for your consult. I will follow-up with patient. Please contact us if you have any additional questions.    Nabil Mckeon MD  Infectious Disease  [facility]    Subjective:     Principal Problem: Sigmoid diverticulitis    HPI:     Kyle Abel is a 43 year old male with a medical history significant for HTN, EDUARDO and Anaphylaxis to PCN. Pt initially presented to [facility] ED 1/20/19 with abdominal pain. CT showed sigmoid diverticulitis at the time. Pt was discharged home with a course of PO Cipro and Flagyl with which he endorse compliance. Pt states that he initially felt better with the antibiotics but acutely worsened on " "1/26. Pt represented to [facility] ED on 1/28 with increasing abdominal pain and low grade fevers (Home Tmax 100.4).     Repeat CT shows "increased inflammation in the region opf known sigmoid diversticulits, with an increase in relatively disorganized fluid.  There is 1 small focus of possible early fluid organization/abscess measuring approximately 2.3 x 1.1 cm.  No definite air within the fluid collection.  Scattered foci of air are noted about the region of diverticulitis suggesting sequela of micro perforation.  In comparison to the previous exam, this has increased.     Pt was admitted to Colorectal surgery and ID was consulted for antibiotic recommendations in pt with PCN allergy.     In ED, pt was started on Aztreonam 1g q8, Flagyl 500mg q8, Vancomycin 1.5g q12.     Past Medical History:   Diagnosis Date    Colon polyps 2017    repeat colonoscopy in 5 years    Hemorrhoid     Hypertension     Diagnosed @ 1 1/2 years ago and medication was prescribed - patient reports he did not take medication and does not want treatment for blood pressure    Internal hemorrhoid, bleeding     Rectal bleed        Past Surgical History:   Procedure Laterality Date    COLONOSCOPY N/A 9/6/2017    Performed by Chapin Hernandez MD at [facility]       Review of patient's allergies indicates:   Allergen Reactions    Penicillins Anaphylaxis and Swelling     As a child age 11       Medications:  Medications Prior to Admission   Medication Sig    ciprofloxacin HCl (CIPRO) 500 MG tablet Take 1 tablet (500 mg total) by mouth every 12 (twelve) hours. for 7 days    metroNIDAZOLE (FLAGYL) 500 MG tablet Take 1 tablet (500 mg total) by mouth every 6 (six) hours. for 7 days     Antibiotics (From admission, onward)    Start     Stop Route Frequency Ordered    01/29/19 1800  aztreonam injection 2,000 mg      -- IV Every 8 hours (non-standard times) 01/29/19 1351    01/29/19 0930  vancomycin 1.5 g in 5 % dextrose 250 mL IVPB  (Vancomycin " IVPB with levels panel)      -- IV Every 12 hours (non-standard times) 19 0805    19 0830  metronidazole IVPB 500 mg      -- IV Every 8 hours (non-standard times) 19 0718        Antifungals (From admission, onward)    None        Antivirals (From admission, onward)    None           Immunization History   Administered Date(s) Administered    Tdap 2018       Family History     Problem Relation (Age of Onset)    No Known Problems Mother, Brother    Ulcerative colitis Father (57)        Social History     Socioeconomic History    Marital status:      Spouse name: None    Number of children: None    Years of education: None    Highest education level: None   Social Needs    Financial resource strain: None    Food insecurity - worry: None    Food insecurity - inability: None    Transportation needs - medical: None    Transportation needs - non-medical: None   Occupational History    Occupation:      Employer: [employer]    Tobacco Use    Smoking status: Former Smoker     Packs/day: 1.50     Years: 25.00     Pack years: 37.50     Types: Cigarettes     Last attempt to quit: 6/10/2018     Years since quittin.6    Smokeless tobacco: Current User     Types: Chew   Substance and Sexual Activity    Alcohol use: Yes     Comment: every weekend - 4 drinks either beer or vodka per occasion    Drug use: No    Sexual activity: Yes   Other Topics Concern    None   Social History Narrative    None     Review of Systems   Constitutional: Positive for appetite change and fever. Negative for activity change, chills and fatigue.   Respiratory: Negative for cough, chest tightness, shortness of breath and wheezing.    Cardiovascular: Negative for chest pain and leg swelling.   Gastrointestinal: Positive for abdominal pain. Negative for abdominal distention, anal bleeding, blood in stool, constipation, diarrhea, nausea, rectal pain and vomiting.   Genitourinary:  Negative for difficulty urinating, enuresis, flank pain, frequency, genital sores and hematuria.   Musculoskeletal: Negative for back pain, gait problem and joint swelling.   Skin: Negative.  Negative for color change.   Neurological: Negative for dizziness, syncope and numbness.   Psychiatric/Behavioral: Negative for agitation, confusion, decreased concentration, dysphoric mood and hallucinations. The patient is not nervous/anxious and is not hyperactive.      Objective:     Vital Signs (Most Recent):  Temp: 100.2 °F (37.9 °C) (01/29/19 1243)  Pulse: 87 (01/29/19 1243)  Resp: 20 (01/29/19 1243)  BP: 118/73 (01/29/19 1243)  SpO2: 99 % (01/29/19 1243) Vital Signs (24h Range):  Temp:  [98.5 °F (36.9 °C)-100.7 °F (38.2 °C)] 100.2 °F (37.9 °C)  Pulse:  [] 87  Resp:  [14-20] 20  SpO2:  [94 %-100 %] 99 %  BP: (113-131)/(65-78) 118/73     Weight: 126.1 kg (278 lb)  Body mass index is 39.89 kg/m².    Estimated Creatinine Clearance: 142.5 mL/min (based on SCr of 0.9 mg/dL).    Physical Exam   Constitutional: He is oriented to person, place, and time. He appears well-developed and well-nourished. No distress.   HENT:   Head: Normocephalic and atraumatic.   Eyes: EOM are normal. Pupils are equal, round, and reactive to light.   Cardiovascular: Normal rate, regular rhythm and normal heart sounds.   Pulmonary/Chest: Effort normal and breath sounds normal. No respiratory distress. He has no wheezes. He has no rales.   Abdominal: Soft. He exhibits no distension and no mass. There is tenderness. There is no guarding.   Pain with palpation to entire abdomen, worse in suprapubic region, LLQ and LUQ   Musculoskeletal: Normal range of motion.   Neurological: He is alert and oriented to person, place, and time.   Skin: Skin is warm and dry.   Psychiatric: He has a normal mood and affect. His behavior is normal. Judgment and thought content normal.   Nursing note and vitals reviewed.      Significant Labs:   Blood Culture:   Recent  Labs   Lab 01/28/19  1356 01/28/19  1409   LABBLOO No Growth to date  No Growth to date No Growth to date  No Growth to date     BMP:   Recent Labs   Lab 01/29/19  0454         K 4.2      CO2 23   BUN 9   CREATININE 0.9   CALCIUM 9.2   MG 2.2     CBC:   Recent Labs   Lab 01/28/19  1151 01/28/19  1159 01/29/19  0455   WBC 16.38*  --  16.13*   HGB 15.3  --  14.0   HCT 47.8 45 43.7     --  252     All pertinent labs within the past 24 hours have been reviewed.    Significant Imaging: I have reviewed all pertinent imaging results/findings within the past 24 hours.     CT Abdo/Pelvis 1/28/19  1. In this patient with known sigmoid diverticulitis.  There is increased inflammation in the region, with an increase in relatively disorganized fluid.  There is 1 small focus of possible early fluid organization/abscess measuring approximately 2.3 x 1.1 cm.  No definite air within the fluid collection.  Scattered foci of air are noted about the region of diverticulitis suggesting sequela of micro perforation.  In comparison to the previous exam, this has increased.  Continued follow-up advised.  2. Hepatic steatosis, correlation with LFTs advised.  3. Additional findings above.

## 2019-01-29 NOTE — PLAN OF CARE
Problem: Adult Inpatient Plan of Care  Goal: Plan of Care Review  Outcome: Ongoing (interventions implemented as appropriate)  Patient is alert and oriented. Able to make needs known to staff. PRN Dilaudid given for pain control. PRN Zofran and Phenergan administered with pain medications for nausea control. No s/s of respiratory/cardiac distress noted. PIVs are patent and flush well. House fluids run continuously at 100 ml/hr. Tolerating IV Antibiotics well with no adverse reactions noted. Patient remains NPO except ice chips. Patient has an elevated temperature that has been increasing over the shift today. MD was made aware. No issues or concerns at this time. Continue to monitor.

## 2019-01-29 NOTE — SUBJECTIVE & OBJECTIVE
Past Medical History:   Diagnosis Date    Colon polyps 2017    repeat colonoscopy in 5 years    Hemorrhoid     Hypertension     Diagnosed @ 1 1/2 years ago and medication was prescribed - patient reports he did not take medication and does not want treatment for blood pressure    Internal hemorrhoid, bleeding     Rectal bleed        Past Surgical History:   Procedure Laterality Date    COLONOSCOPY N/A 9/6/2017    Performed by Chapin Hernandez MD at [facility]       Review of patient's allergies indicates:   Allergen Reactions    Penicillins Anaphylaxis and Swelling     As a child age 11       Medications:  Medications Prior to Admission   Medication Sig    ciprofloxacin HCl (CIPRO) 500 MG tablet Take 1 tablet (500 mg total) by mouth every 12 (twelve) hours. for 7 days    metroNIDAZOLE (FLAGYL) 500 MG tablet Take 1 tablet (500 mg total) by mouth every 6 (six) hours. for 7 days     Antibiotics (From admission, onward)    Start     Stop Route Frequency Ordered    01/29/19 1800  aztreonam injection 2,000 mg      -- IV Every 8 hours (non-standard times) 01/29/19 1351    01/29/19 0930  vancomycin 1.5 g in 5 % dextrose 250 mL IVPB  (Vancomycin IVPB with levels panel)      -- IV Every 12 hours (non-standard times) 01/29/19 0805    01/29/19 0830  metronidazole IVPB 500 mg      -- IV Every 8 hours (non-standard times) 01/29/19 0718        Antifungals (From admission, onward)    None        Antivirals (From admission, onward)    None           Immunization History   Administered Date(s) Administered    Tdap 06/13/2018       Family History     Problem Relation (Age of Onset)    No Known Problems Mother, Brother    Ulcerative colitis Father (57)        Social History     Socioeconomic History    Marital status:      Spouse name: None    Number of children: None    Years of education: None    Highest education level: None   Social Needs    Financial resource strain: None    Food insecurity - worry:  None    Food insecurity - inability: None    Transportation needs - medical: None    Transportation needs - non-medical: None   Occupational History    Occupation:      Employer: [email or web address]    Tobacco Use    Smoking status: Former Smoker     Packs/day: 1.50     Years: 25.00     Pack years: 37.50     Types: Cigarettes     Last attempt to quit: 6/10/2018     Years since quittin.6    Smokeless tobacco: Current User     Types: Chew   Substance and Sexual Activity    Alcohol use: Yes     Comment: every weekend - 4 drinks either beer or vodka per occasion    Drug use: No    Sexual activity: Yes   Other Topics Concern    None   Social History Narrative    None     Review of Systems   Constitutional: Positive for appetite change and fever. Negative for activity change, chills and fatigue.   Respiratory: Negative for cough, chest tightness, shortness of breath and wheezing.    Cardiovascular: Negative for chest pain and leg swelling.   Gastrointestinal: Positive for abdominal pain. Negative for abdominal distention, anal bleeding, blood in stool, constipation, diarrhea, nausea, rectal pain and vomiting.   Genitourinary: Negative for difficulty urinating, enuresis, flank pain, frequency, genital sores and hematuria.   Musculoskeletal: Negative for back pain, gait problem and joint swelling.   Skin: Negative.  Negative for color change.   Neurological: Negative for dizziness, syncope and numbness.   Psychiatric/Behavioral: Negative for agitation, confusion, decreased concentration, dysphoric mood and hallucinations. The patient is not nervous/anxious and is not hyperactive.      Objective:     Vital Signs (Most Recent):  Temp: 100.2 °F (37.9 °C) (19 1243)  Pulse: 87 (19 1243)  Resp: 20 (19 1243)  BP: 118/73 (19 1243)  SpO2: 99 % (19 1243) Vital Signs (24h Range):  Temp:  [98.5 °F (36.9 °C)-100.7 °F (38.2 °C)] 100.2 °F (37.9 °C)  Pulse:  [] 87  Resp:   [14-20] 20  SpO2:  [94 %-100 %] 99 %  BP: (113-131)/(65-78) 118/73     Weight: 126.1 kg (278 lb)  Body mass index is 39.89 kg/m².    Estimated Creatinine Clearance: 142.5 mL/min (based on SCr of 0.9 mg/dL).    Physical Exam   Constitutional: He is oriented to person, place, and time. He appears well-developed and well-nourished. No distress.   HENT:   Head: Normocephalic and atraumatic.   Eyes: EOM are normal. Pupils are equal, round, and reactive to light.   Cardiovascular: Normal rate, regular rhythm and normal heart sounds.   Pulmonary/Chest: Effort normal and breath sounds normal. No respiratory distress. He has no wheezes. He has no rales.   Abdominal: Soft. He exhibits no distension and no mass. There is tenderness. There is no guarding.   Pain with palpation to entire abdomen, worse in suprapubic region, LLQ and LUQ   Musculoskeletal: Normal range of motion.   Neurological: He is alert and oriented to person, place, and time.   Skin: Skin is warm and dry.   Psychiatric: He has a normal mood and affect. His behavior is normal. Judgment and thought content normal.   Nursing note and vitals reviewed.      Significant Labs:   Blood Culture:   Recent Labs   Lab 01/28/19  1356 01/28/19  1409   LABBLOO No Growth to date  No Growth to date No Growth to date  No Growth to date     BMP:   Recent Labs   Lab 01/29/19  0454         K 4.2      CO2 23   BUN 9   CREATININE 0.9   CALCIUM 9.2   MG 2.2     CBC:   Recent Labs   Lab 01/28/19  1151 01/28/19  1159 01/29/19  0455   WBC 16.38*  --  16.13*   HGB 15.3  --  14.0   HCT 47.8 45 43.7     --  252     All pertinent labs within the past 24 hours have been reviewed.    Significant Imaging: I have reviewed all pertinent imaging results/findings within the past 24 hours.     CT Abdo/Pelvis 1/28/19  1. In this patient with known sigmoid diverticulitis.  There is increased inflammation in the region, with an increase in relatively disorganized fluid.   There is 1 small focus of possible early fluid organization/abscess measuring approximately 2.3 x 1.1 cm.  No definite air within the fluid collection.  Scattered foci of air are noted about the region of diverticulitis suggesting sequela of micro perforation.  In comparison to the previous exam, this has increased.  Continued follow-up advised.  2. Hepatic steatosis, correlation with LFTs advised.  3. Additional findings above.

## 2019-01-29 NOTE — HPI
"    Kyle Abel is a 43 year old male with a medical history significant for HTN, EDUARDO and Anaphylaxis to PCN. Pt initially presented to [facility] ED 1/20/19 with abdominal pain. CT showed sigmoid diverticulitis at the time. Pt was discharged home with a course of PO Cipro and Flagyl with which he endorse compliance. Pt states that he initially felt better with the antibiotics but acutely worsened on 1/26. Pt represented to [facility] ED on 1/28 with increasing abdominal pain and low grade fevers (Home Tmax 100.4).     Repeat CT shows "increased inflammation in the region opf known sigmoid diversticulits, with an increase in relatively disorganized fluid.  There is 1 small focus of possible early fluid organization/abscess measuring approximately 2.3 x 1.1 cm.  No definite air within the fluid collection.  Scattered foci of air are noted about the region of diverticulitis suggesting sequela of micro perforation.  In comparison to the previous exam, this has increased.     Pt was admitted to Colorectal surgery and ID was consulted for antibiotic recommendations in pt with PCN allergy.     In ED, pt was started on Aztreonam 1g q8, Flagyl 500mg q8, Vancomycin 1.5g q12.   "

## 2019-01-29 NOTE — NURSING
MD was made aware of patient's elevated temperature of 101.2 and stated that patient is already on scheduled Tylenol. MD stated that he wants the nurses to continue to monitor the patient's temperatures and let MD know if it reaches 103.0 or if the patient's other vital signs start becoming abnormal. Will continue to monitor.

## 2019-01-29 NOTE — ED NOTES
Attempted to call report,  states that the bed has not been assigned to a nurse and to call back in 5 min

## 2019-01-30 LAB
ANION GAP SERPL CALC-SCNC: 5 MMOL/L
BASOPHILS # BLD AUTO: 0.02 K/UL
BASOPHILS NFR BLD: 0.1 %
BUN SERPL-MCNC: 8 MG/DL
CALCIUM SERPL-MCNC: 8.5 MG/DL
CHLORIDE SERPL-SCNC: 102 MMOL/L
CO2 SERPL-SCNC: 23 MMOL/L
CREAT SERPL-MCNC: 1 MG/DL
CRP SERPL-MCNC: 287.2 MG/L
DIFFERENTIAL METHOD: ABNORMAL
EOSINOPHIL # BLD AUTO: 0 K/UL
EOSINOPHIL NFR BLD: 0.2 %
ERYTHROCYTE [DISTWIDTH] IN BLOOD BY AUTOMATED COUNT: 14.7 %
EST. GFR  (AFRICAN AMERICAN): >60 ML/MIN/1.73 M^2
EST. GFR  (NON AFRICAN AMERICAN): >60 ML/MIN/1.73 M^2
GLUCOSE SERPL-MCNC: 367 MG/DL
HCT VFR BLD AUTO: 40.3 %
HGB BLD-MCNC: 12.6 G/DL
IMM GRANULOCYTES # BLD AUTO: 0.12 K/UL
IMM GRANULOCYTES NFR BLD AUTO: 0.8 %
LYMPHOCYTES # BLD AUTO: 0.8 K/UL
LYMPHOCYTES NFR BLD: 5.6 %
MAGNESIUM SERPL-MCNC: 1.8 MG/DL
MCH RBC QN AUTO: 28.4 PG
MCHC RBC AUTO-ENTMCNC: 31.3 G/DL
MCV RBC AUTO: 91 FL
MONOCYTES # BLD AUTO: 1.3 K/UL
MONOCYTES NFR BLD: 8.8 %
NEUTROPHILS # BLD AUTO: 12.4 K/UL
NEUTROPHILS NFR BLD: 84.5 %
NRBC BLD-RTO: 0 /100 WBC
PHOSPHATE SERPL-MCNC: 1.5 MG/DL
PLATELET # BLD AUTO: 210 K/UL
PMV BLD AUTO: 11.4 FL
POTASSIUM SERPL-SCNC: 5.4 MMOL/L
RBC # BLD AUTO: 4.43 M/UL
SODIUM SERPL-SCNC: 130 MMOL/L
WBC # BLD AUTO: 14.71 K/UL

## 2019-01-30 PROCEDURE — 25000003 PHARM REV CODE 250: Performed by: NURSE PRACTITIONER

## 2019-01-30 PROCEDURE — 86140 C-REACTIVE PROTEIN: CPT

## 2019-01-30 PROCEDURE — 84100 ASSAY OF PHOSPHORUS: CPT

## 2019-01-30 PROCEDURE — A4216 STERILE WATER/SALINE, 10 ML: HCPCS | Performed by: NURSE PRACTITIONER

## 2019-01-30 PROCEDURE — 99233 PR SUBSEQUENT HOSPITAL CARE,LEVL III: ICD-10-PCS | Mod: ,,, | Performed by: INTERNAL MEDICINE

## 2019-01-30 PROCEDURE — 36415 COLL VENOUS BLD VENIPUNCTURE: CPT

## 2019-01-30 PROCEDURE — 63600175 PHARM REV CODE 636 W HCPCS: Performed by: NURSE PRACTITIONER

## 2019-01-30 PROCEDURE — 25500020 PHARM REV CODE 255: Performed by: COLON & RECTAL SURGERY

## 2019-01-30 PROCEDURE — 83735 ASSAY OF MAGNESIUM: CPT

## 2019-01-30 PROCEDURE — 25000003 PHARM REV CODE 250: Performed by: STUDENT IN AN ORGANIZED HEALTH CARE EDUCATION/TRAINING PROGRAM

## 2019-01-30 PROCEDURE — 25500020 PHARM REV CODE 255: Performed by: STUDENT IN AN ORGANIZED HEALTH CARE EDUCATION/TRAINING PROGRAM

## 2019-01-30 PROCEDURE — 80048 BASIC METABOLIC PNL TOTAL CA: CPT

## 2019-01-30 PROCEDURE — 25000003 PHARM REV CODE 250: Performed by: INTERNAL MEDICINE

## 2019-01-30 PROCEDURE — S0073 INJECTION, AZTREONAM, 500 MG: HCPCS | Performed by: INTERNAL MEDICINE

## 2019-01-30 PROCEDURE — 20600001 HC STEP DOWN PRIVATE ROOM

## 2019-01-30 PROCEDURE — 99233 SBSQ HOSP IP/OBS HIGH 50: CPT | Mod: ,,, | Performed by: INTERNAL MEDICINE

## 2019-01-30 PROCEDURE — S0030 INJECTION, METRONIDAZOLE: HCPCS | Performed by: NURSE PRACTITIONER

## 2019-01-30 PROCEDURE — 85025 COMPLETE CBC W/AUTO DIFF WBC: CPT

## 2019-01-30 PROCEDURE — 80202 ASSAY OF VANCOMYCIN: CPT

## 2019-01-30 PROCEDURE — 63600175 PHARM REV CODE 636 W HCPCS: Performed by: STUDENT IN AN ORGANIZED HEALTH CARE EDUCATION/TRAINING PROGRAM

## 2019-01-30 RX ORDER — SODIUM CHLORIDE 9 MG/ML
INJECTION, SOLUTION INTRAVENOUS CONTINUOUS
Status: DISCONTINUED | OUTPATIENT
Start: 2019-01-30 | End: 2019-02-01

## 2019-01-30 RX ADMIN — IOHEXOL 15 ML: 350 INJECTION, SOLUTION INTRAVENOUS at 12:01

## 2019-01-30 RX ADMIN — IOHEXOL 100 ML: 350 INJECTION, SOLUTION INTRAVENOUS at 02:01

## 2019-01-30 RX ADMIN — ONDANSETRON 4 MG: 2 INJECTION INTRAMUSCULAR; INTRAVENOUS at 05:01

## 2019-01-30 RX ADMIN — AZTREONAM 2000 MG: 2 INJECTION, POWDER, LYOPHILIZED, FOR SOLUTION INTRAMUSCULAR; INTRAVENOUS at 10:01

## 2019-01-30 RX ADMIN — AZTREONAM 2000 MG: 2 INJECTION, POWDER, LYOPHILIZED, FOR SOLUTION INTRAMUSCULAR; INTRAVENOUS at 02:01

## 2019-01-30 RX ADMIN — HYDROMORPHONE HYDROCHLORIDE 1 MG: 1 INJECTION, SOLUTION INTRAMUSCULAR; INTRAVENOUS; SUBCUTANEOUS at 10:01

## 2019-01-30 RX ADMIN — HYDROMORPHONE HYDROCHLORIDE 1 MG: 1 INJECTION, SOLUTION INTRAMUSCULAR; INTRAVENOUS; SUBCUTANEOUS at 09:01

## 2019-01-30 RX ADMIN — HYDROMORPHONE HYDROCHLORIDE 1 MG: 1 INJECTION, SOLUTION INTRAMUSCULAR; INTRAVENOUS; SUBCUTANEOUS at 02:01

## 2019-01-30 RX ADMIN — HYDROMORPHONE HYDROCHLORIDE 1 MG: 1 INJECTION, SOLUTION INTRAMUSCULAR; INTRAVENOUS; SUBCUTANEOUS at 07:01

## 2019-01-30 RX ADMIN — AZTREONAM 2000 MG: 2 INJECTION, POWDER, LYOPHILIZED, FOR SOLUTION INTRAMUSCULAR; INTRAVENOUS at 05:01

## 2019-01-30 RX ADMIN — SODIUM CHLORIDE: 0.9 INJECTION, SOLUTION INTRAVENOUS at 08:01

## 2019-01-30 RX ADMIN — Medication 3 ML: at 10:01

## 2019-01-30 RX ADMIN — METRONIDAZOLE 500 MG: 500 INJECTION, SOLUTION INTRAVENOUS at 08:01

## 2019-01-30 RX ADMIN — PROMETHAZINE HYDROCHLORIDE 6.25 MG: 25 INJECTION INTRAMUSCULAR; INTRAVENOUS at 12:01

## 2019-01-30 RX ADMIN — HYDROMORPHONE HYDROCHLORIDE 1 MG: 1 INJECTION, SOLUTION INTRAMUSCULAR; INTRAVENOUS; SUBCUTANEOUS at 05:01

## 2019-01-30 RX ADMIN — PROMETHAZINE HYDROCHLORIDE 6.25 MG: 25 INJECTION INTRAMUSCULAR; INTRAVENOUS at 02:01

## 2019-01-30 RX ADMIN — IOHEXOL 30 ML: 350 INJECTION, SOLUTION INTRAVENOUS at 02:01

## 2019-01-30 RX ADMIN — ONDANSETRON 4 MG: 2 INJECTION INTRAMUSCULAR; INTRAVENOUS at 03:01

## 2019-01-30 RX ADMIN — Medication 3 ML: at 06:01

## 2019-01-30 RX ADMIN — HYDROMORPHONE HYDROCHLORIDE 1 MG: 1 INJECTION, SOLUTION INTRAMUSCULAR; INTRAVENOUS; SUBCUTANEOUS at 03:01

## 2019-01-30 RX ADMIN — VANCOMYCIN HYDROCHLORIDE 1500 MG: 100 INJECTION, POWDER, LYOPHILIZED, FOR SOLUTION INTRAVENOUS at 10:01

## 2019-01-30 RX ADMIN — SODIUM CHLORIDE: 0.9 INJECTION, SOLUTION INTRAVENOUS at 10:01

## 2019-01-30 RX ADMIN — HYDROMORPHONE HYDROCHLORIDE 1 MG: 1 INJECTION, SOLUTION INTRAMUSCULAR; INTRAVENOUS; SUBCUTANEOUS at 12:01

## 2019-01-30 RX ADMIN — Medication 3 ML: at 01:01

## 2019-01-30 RX ADMIN — METRONIDAZOLE 500 MG: 500 INJECTION, SOLUTION INTRAVENOUS at 05:01

## 2019-01-30 RX ADMIN — IOHEXOL 15 ML: 350 INJECTION, SOLUTION INTRAVENOUS at 01:01

## 2019-01-30 NOTE — ASSESSMENT & PLAN NOTE
43 year old male failed outpt treatment for diverticulitis    -  -npo  -may have ice sparingly  -per recs from ID, vanc, azto and y  -l consult ID due to severe allergy to PCN  -seerial abd exams  -daily labs  -no improvement in abd  Pain  -Ct today   -pt and family agree with plans

## 2019-01-30 NOTE — PROGRESS NOTES
[facility]  Colorectal Surgery  Progress Note    Patient Name: Kyle Abel  MRN: [id number]  Admission Date: 1/28/2019  Hospital Length of Stay: 2 days  Attending Physician: Samir Guidry MD    Subjective:     Interval History: no acute events overnite, temp 101.2, abd pain not improved, pos for flatus     Post-Op Info:  * No surgery found *          Medications:  Continuous Infusions:   sodium chloride 0.9% 100 mL/hr at 01/30/19 0852     Scheduled Meds:   aztreonam  2,000 mg Intravenous Q8H    metronidazole  500 mg Intravenous Q8H    sodium chloride 0.9%  3 mL Intravenous Q8H    vancomycin (VANCOCIN) IVPB  1,500 mg Intravenous Q12H     PRN Meds:   HYDROmorphone    naloxone    omnipaque    ondansetron    promethazine (PHENERGAN) IVPB    sodium chloride 0.9%        Objective:     Vital Signs (Most Recent):  Temp: (!) 101.6 °F (38.7 °C) (01/30/19 1200)  Pulse: 95 (01/30/19 1200)  Resp: 20 (01/30/19 1200)  BP: 116/63 (01/30/19 1200)  SpO2: 95 % (01/30/19 1200) Vital Signs (24h Range):  Temp:  [98.2 °F (36.8 °C)-101.6 °F (38.7 °C)] 101.6 °F (38.7 °C)  Pulse:  [] 95  Resp:  [18-20] 20  SpO2:  [95 %-99 %] 95 %  BP: (116-128)/(63-77) 116/63     Intake/Output - Last 3 Shifts       01/28 0700 - 01/29 0659 01/29 0700 - 01/30 0659 01/30 0700 - 01/31 0659    P.O.  60     I.V. (mL/kg) 715 (5.7) 2843.3 (22.5) 483.3 (3.8)    IV Piggyback 1500 1050 100    Total Intake(mL/kg) 2215 (17.6) 3953.3 (31.4) 583.3 (4.6)    Urine (mL/kg/hr) 150      Total Output 150      Net +2065 +3953.3 +583.3           Urine Occurrence 3 x 8 x 1 x    Stool Occurrence  3 x           Physical Exam   Constitutional: He is oriented to person, place, and time. He appears well-developed and well-nourished.   Cardiovascular: Normal rate, regular rhythm and normal heart sounds.   Pulmonary/Chest: Effort normal and breath sounds normal. No respiratory distress. He has no wheezes. He has no rales.   Abdominal: Soft. He  exhibits no distension and no mass. There is tenderness. There is no guarding.   Musculoskeletal: Normal range of motion.   Neurological: He is alert and oriented to person, place, and time.   Skin: Skin is warm and dry.   Psychiatric: He has a normal mood and affect. His behavior is normal. Judgment and thought content normal.   Nursing note and vitals reviewed.      Significant Labs:  BMP (Last 3 Results):   Recent Labs   Lab 01/28/19  1151 01/29/19  0454 01/30/19  0351    105 367*    136 130*   K 4.2 4.2 5.4*    105 102   CO2 24 23 23   BUN 14 9 8   CREATININE 1.0 0.9 1.0   CALCIUM 9.7 9.2 8.5*   MG  --  2.2 1.8     CBC (Last 3 Results):   Recent Labs   Lab 01/28/19  1151 01/28/19  1159 01/29/19  0455 01/30/19  0351   WBC 16.38*  --  16.13* 14.71*   RBC 5.33  --  4.99 4.43*   HGB 15.3  --  14.0 12.6*   HCT 47.8 45 43.7 40.3     --  252 210   MCV 90  --  88 91   MCH 28.7  --  28.1 28.4   MCHC 32.0  --  32.0 31.3*       Significant Diagnostics:  Ct a/p today with oral, IV and rectal contrast    Assessment/Plan:     * Sigmoid diverticulitis    43 year old male failed outpt treatment for diverticulitis    -  -npo  -may have ice sparingly  -per recs from ID, vanc, azto and flagy  -l consult ID due to severe allergy to PCN  -seerial abd exams  -daily labs  -no improvement in abd  Pain  -Ct today   -pt and family agree with plans              Lorenza Reynolds NP  Colorectal Surgery  [facility]

## 2019-01-30 NOTE — PLAN OF CARE
Problem: Adult Inpatient Plan of Care  Goal: Plan of Care Review  Outcome: Ongoing (interventions implemented as appropriate)  Patient is alert and oriented. Able to make needs known to staff. PRN Dilaudid given for pain control. PRN Zofran and Phenergan administered with pain medications for nausea control. Both PIVs are patent and flush well. NS runs continuously at 100 ml/hr. IV antibiotic therapy continues with no adverse reactions noted. Patient remains NPO except ice chips. Patient continues to have a elevated temperature. MD aware. No other issues or concerns at this time. Continue to monitor.

## 2019-01-30 NOTE — PROGRESS NOTES
"[facility]  Infectious Disease  Progress Note    Patient Name: Kyle Abel  MRN: [id number]  Admission Date: 1/28/2019  Length of Stay: 2 days  Attending Physician: Samir Guidry MD  Primary Care Provider: Gilma Cuba NP    Isolation Status: No active isolations  Assessment/Plan:      * Sigmoid diverticulitis    - CT shows increased inflammation in the sigmoid region, cocnern for fluid organization/abscess formation and microperforation  - No improvement with outpatient course of PO cipro and flagyl   - ID consulted for abx recs in pt with severe PCN allergy  - Pt unsure if ever exposed to Cephalosporins  - "Failed" outpatient therapy with Cipro and Flagyl likely due to lack of source control      Plan  1. Continue Aztreonam, Flagyl and Vanc  2. Will avoid PCNs and any cross reactive antibiotics (Zosyn, Cephalosporins etc) and will treat outpatient management as failed    3. Can consider Minocycline if pt does not improve  4. Pt with likely abscess and microperforations will need source control for resolution of infection. Defer to primary surgical team for source control timing.   5. Will follow blood cultures  6. Will follow repeat CT scan         Thank you for your consult. I will follow-up with patient. Please contact us if you have any additional questions.    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 Pt initially presented to [facility] ED 1/20/19 with abdominal pain. CT showed sigmoid diverticulitis at the time. Pt was discharged home with a course of PO Cipro and Flagyl with which he endorse compliance. Pt states that he initially felt better with the antibiotics but acutely worsened on 1/26. Pt [facility] on 1/28 with increasing abdominal pain and " "low grade fevers (Home Tmax 100.4).     Repeat CT shows "increased inflammation in the region opf known sigmoid diversticulits, with an increase in relatively disorganized fluid.  There is 1 small focus of possible early fluid organization/abscess measuring approximately 2.3 x 1.1 cm.  No definite air within the fluid collection.  Scattered foci of air are noted about the region of diverticulitis suggesting sequela of micro perforation.  In comparison to the previous exam, this has increased.     Pt was admitted to Colorectal surgery and ID was consulted for antibiotic recommendations in pt with PCN allergy.     In ED, pt was started on Aztreonam 1g q8, Flagyl 500mg q8, Vancomycin 1.5g q12.   Interval History: NAEON. Pt states that pain has improved. Remains febrile with Tmax 101.2. Continues to note shifting abd pain. Will continue current abx regimen. Awaiting primary surgical team recs for definite source control. Will follow CT scan today.     Review of Systems   Constitutional: Positive for appetite change and fever. Negative for activity change, chills and fatigue.   Respiratory: Negative for cough, chest tightness, shortness of breath and wheezing.    Cardiovascular: Negative for chest pain and leg swelling.   Gastrointestinal: Positive for abdominal pain. Negative for abdominal distention, anal bleeding, blood in stool, constipation, diarrhea, nausea, rectal pain and vomiting.   Genitourinary: Negative for difficulty urinating, enuresis, flank pain, frequency, genital sores and hematuria.   Musculoskeletal: Negative for back pain, gait problem and joint swelling.   Skin: Negative.  Negative for color change.   Neurological: Negative for dizziness, syncope and numbness.   Psychiatric/Behavioral: Negative for agitation, confusion, decreased concentration, dysphoric mood and hallucinations. The patient is not nervous/anxious and is not hyperactive.      Objective:     Vital Signs (Most Recent):  Temp: 99 °F " (37.2 °C) (01/30/19 0844)  Pulse: 98 (01/30/19 0844)  Resp: 18 (01/30/19 0844)  BP: 125/75 (01/30/19 0844)  SpO2: 96 % (01/30/19 0844) Vital Signs (24h Range):  Temp:  [98.2 °F (36.8 °C)-101.2 °F (38.4 °C)] 99 °F (37.2 °C)  Pulse:  [] 98  Resp:  [18-20] 18  SpO2:  [95 %-99 %] 96 %  BP: (118-128)/(73-77) 125/75     Weight: 126.1 kg (278 lb)  Body mass index is 39.89 kg/m².    Estimated Creatinine Clearance: 128.2 mL/min (based on SCr of 1 mg/dL).    Physical Exam   Constitutional: He is oriented to person, place, and time. He appears well-developed and well-nourished. No distress.   HENT:   Head: Normocephalic and atraumatic.   Eyes: EOM are normal. Pupils are equal, round, and reactive to light.   Cardiovascular: Normal rate, regular rhythm and normal heart sounds.   Pulmonary/Chest: Effort normal and breath sounds normal. No respiratory distress. He has no wheezes. He has no rales.   Abdominal: Soft. He exhibits no distension and no mass. There is tenderness. There is no guarding.   Pain with palpation to entire abdomen, worse in suprapubic region, LLQ and LUQ   Musculoskeletal: Normal range of motion.   Neurological: He is alert and oriented to person, place, and time.   Skin: Skin is warm and dry.   Psychiatric: He has a normal mood and affect. His behavior is normal. Judgment and thought content normal.   Nursing note and vitals reviewed.      Significant Labs:   Blood Culture:   Recent Labs   Lab 01/28/19  1356 01/28/19  1409   LABBLOO No Growth to date  No Growth to date No Growth to date  No Growth to date     BMP:   Recent Labs   Lab 01/30/19  0351   *   *   K 5.4*      CO2 23   BUN 8   CREATININE 1.0   CALCIUM 8.5*   MG 1.8     CBC:   Recent Labs   Lab 01/28/19  1151 01/28/19  1159 01/29/19  0455 01/30/19  0351   WBC 16.38*  --  16.13* 14.71*   HGB 15.3  --  14.0 12.6*   HCT 47.8 45 43.7 40.3     --  252 210     All pertinent labs within the past 24 hours have been  reviewed.    Significant Imaging: I have reviewed all pertinent imaging results/findings within the past 24 hours.     CT Abdo/Pelvis 1/28/19  1. In this patient with known sigmoid diverticulitis.  There is increased inflammation in the region, with an increase in relatively disorganized fluid.  There is 1 small focus of possible early fluid organization/abscess measuring approximately 2.3 x 1.1 cm.  No definite air within the fluid collection.  Scattered foci of air are noted about the region of diverticulitis suggesting sequela of micro perforation.  In comparison to the previous exam, this has increased.  Continued follow-up advised.  2. Hepatic steatosis, correlation with LFTs advised.  3. Additional findings above.

## 2019-01-30 NOTE — ASSESSMENT & PLAN NOTE
"- CT shows increased inflammation in the sigmoid region, cocnern for fluid organization/abscess formation and microperforation  - No improvement with outpatient course of PO cipro and flagyl   - ID consulted for abx recs in pt with severe PCN allergy  - Pt unsure if ever exposed to Cephalosporins  - "Failed" outpatient therapy with Cipro and Flagyl likely due to lack of source control      Plan  1. Continue Aztreonam, Flagyl and Vanc  2. Will avoid PCNs and any cross reactive antibiotics (Zosyn, Cephalosporins etc) and will treat outpatient management as failed    3. Can consider Minocycline if pt does not improve  4. Pt with likely abscess and microperforations will need source control for resolution of infection. Defer to primary surgical team for source control timing.   5. Will follow blood cultures  6. Will follow repeat CT scan  "

## 2019-01-30 NOTE — PLAN OF CARE
PCP: Gilma Cuba NP    Pharmacy:   [id number] - [place] - [address] [facility]  [address] [place] [address]  Phone: [phone] Fax: [phone]    Payor: BLUE CROSS BLUE SHIELD / Plan: [place] PPO / Product Type: PPO /        01/29/19 1545   Discharge Assessment   Assessment Type Discharge Planning Assessment   Confirmed/corrected address and phone number on facesheet? Yes   Assessment information obtained from? Patient   Prior to hospitilization cognitive status: Alert/Oriented   Prior to hospitalization functional status: Independent   Current cognitive status: Alert/Oriented   Current Functional Status: Independent   Lives With child(ashkan), dependent   Able to Return to Prior Arrangements yes   Is patient able to care for self after discharge? Yes   Patient's perception of discharge disposition home or selfcare   Readmission Within the Last 30 Days no previous admission in last 30 days   Patient currently being followed by outpatient case management? No   Patient currently receives any other outside agency services? No   Equipment Currently Used at Home CPAP   Do you have any problems affording any of your prescribed medications? TBD   Is the patient taking medications as prescribed? yes   Does the patient have transportation home? Yes   Transportation Anticipated family or friend will provide   Dialysis Name and Scheduled days n/a   Does the patient receive services at the Coumadin Clinic? No   Discharge Plan A Home   Discharge Plan B Home;Home with family;Home Health   DME Needed Upon Discharge  none   Patient/Family in Agreement with Plan yes

## 2019-01-30 NOTE — SUBJECTIVE & OBJECTIVE
Subjective:     Interval History: no acute events overnite, temp 101.2, abd pain not improved, pos for flatus     Post-Op Info:  * No surgery found *          Medications:  Continuous Infusions:   sodium chloride 0.9% 100 mL/hr at 01/30/19 0852     Scheduled Meds:   aztreonam  2,000 mg Intravenous Q8H    metronidazole  500 mg Intravenous Q8H    sodium chloride 0.9%  3 mL Intravenous Q8H    vancomycin (VANCOCIN) IVPB  1,500 mg Intravenous Q12H     PRN Meds:   HYDROmorphone    naloxone    omnipaque    ondansetron    promethazine (PHENERGAN) IVPB    sodium chloride 0.9%        Objective:     Vital Signs (Most Recent):  Temp: (!) 101.6 °F (38.7 °C) (01/30/19 1200)  Pulse: 95 (01/30/19 1200)  Resp: 20 (01/30/19 1200)  BP: 116/63 (01/30/19 1200)  SpO2: 95 % (01/30/19 1200) Vital Signs (24h Range):  Temp:  [98.2 °F (36.8 °C)-101.6 °F (38.7 °C)] 101.6 °F (38.7 °C)  Pulse:  [] 95  Resp:  [18-20] 20  SpO2:  [95 %-99 %] 95 %  BP: (116-128)/(63-77) 116/63     Intake/Output - Last 3 Shifts       01/28 0700 - 01/29 0659 01/29 0700 - 01/30 0659 01/30 0700 - 01/31 0659    P.O.  60     I.V. (mL/kg) 715 (5.7) 2843.3 (22.5) 483.3 (3.8)    IV Piggyback 1500 1050 100    Total Intake(mL/kg) 2215 (17.6) 3953.3 (31.4) 583.3 (4.6)    Urine (mL/kg/hr) 150      Total Output 150      Net +2065 +3953.3 +583.3           Urine Occurrence 3 x 8 x 1 x    Stool Occurrence  3 x           Physical Exam   Constitutional: He is oriented to person, place, and time. He appears well-developed and well-nourished.   Cardiovascular: Normal rate, regular rhythm and normal heart sounds.   Pulmonary/Chest: Effort normal and breath sounds normal. No respiratory distress. He has no wheezes. He has no rales.   Abdominal: Soft. He exhibits no distension and no mass. There is tenderness. There is no guarding.   Musculoskeletal: Normal range of motion.   Neurological: He is alert and oriented to person, place, and time.   Skin: Skin is warm and dry.  Bedside report given to night shift nurseAlicia      Psychiatric: He has a normal mood and affect. His behavior is normal. Judgment and thought content normal.   Nursing note and vitals reviewed.      Significant Labs:  BMP (Last 3 Results):   Recent Labs   Lab 01/28/19  1151 01/29/19  0454 01/30/19  0351    105 367*    136 130*   K 4.2 4.2 5.4*    105 102   CO2 24 23 23   BUN 14 9 8   CREATININE 1.0 0.9 1.0   CALCIUM 9.7 9.2 8.5*   MG  --  2.2 1.8     CBC (Last 3 Results):   Recent Labs   Lab 01/28/19  1151 01/28/19  1159 01/29/19  0455 01/30/19  0351   WBC 16.38*  --  16.13* 14.71*   RBC 5.33  --  4.99 4.43*   HGB 15.3  --  14.0 12.6*   HCT 47.8 45 43.7 40.3     --  252 210   MCV 90  --  88 91   MCH 28.7  --  28.1 28.4   MCHC 32.0  --  32.0 31.3*       Significant Diagnostics:  Ct a/p today with oral, IV and rectal contrast

## 2019-01-30 NOTE — SUBJECTIVE & OBJECTIVE
Interval History: NAEON. Pt states that pain has improved. Remains febrile with Tmax 101.2. Continues to note shifting abd pain. Will continue current abx regimen. Awaiting primary surgical team recs for definite source control. Will follow CT scan today.     Review of Systems   Constitutional: Positive for appetite change and fever. Negative for activity change, chills and fatigue.   Respiratory: Negative for cough, chest tightness, shortness of breath and wheezing.    Cardiovascular: Negative for chest pain and leg swelling.   Gastrointestinal: Positive for abdominal pain. Negative for abdominal distention, anal bleeding, blood in stool, constipation, diarrhea, nausea, rectal pain and vomiting.   Genitourinary: Negative for difficulty urinating, enuresis, flank pain, frequency, genital sores and hematuria.   Musculoskeletal: Negative for back pain, gait problem and joint swelling.   Skin: Negative.  Negative for color change.   Neurological: Negative for dizziness, syncope and numbness.   Psychiatric/Behavioral: Negative for agitation, confusion, decreased concentration, dysphoric mood and hallucinations. The patient is not nervous/anxious and is not hyperactive.      Objective:     Vital Signs (Most Recent):  Temp: 99 °F (37.2 °C) (01/30/19 0844)  Pulse: 98 (01/30/19 0844)  Resp: 18 (01/30/19 0844)  BP: 125/75 (01/30/19 0844)  SpO2: 96 % (01/30/19 0844) Vital Signs (24h Range):  Temp:  [98.2 °F (36.8 °C)-101.2 °F (38.4 °C)] 99 °F (37.2 °C)  Pulse:  [] 98  Resp:  [18-20] 18  SpO2:  [95 %-99 %] 96 %  BP: (118-128)/(73-77) 125/75     Weight: 126.1 kg (278 lb)  Body mass index is 39.89 kg/m².    Estimated Creatinine Clearance: 128.2 mL/min (based on SCr of 1 mg/dL).    Physical Exam   Constitutional: He is oriented to person, place, and time. He appears well-developed and well-nourished. No distress.   HENT:   Head: Normocephalic and atraumatic.   Eyes: EOM are normal. Pupils are equal, round, and reactive to  light.   Cardiovascular: Normal rate, regular rhythm and normal heart sounds.   Pulmonary/Chest: Effort normal and breath sounds normal. No respiratory distress. He has no wheezes. He has no rales.   Abdominal: Soft. He exhibits no distension and no mass. There is tenderness. There is no guarding.   Pain with palpation to entire abdomen, worse in suprapubic region, LLQ and LUQ   Musculoskeletal: Normal range of motion.   Neurological: He is alert and oriented to person, place, and time.   Skin: Skin is warm and dry.   Psychiatric: He has a normal mood and affect. His behavior is normal. Judgment and thought content normal.   Nursing note and vitals reviewed.      Significant Labs:   Blood Culture:   Recent Labs   Lab 01/28/19  1356 01/28/19  1409   LABBLOO No Growth to date  No Growth to date No Growth to date  No Growth to date     BMP:   Recent Labs   Lab 01/30/19  0351   *   *   K 5.4*      CO2 23   BUN 8   CREATININE 1.0   CALCIUM 8.5*   MG 1.8     CBC:   Recent Labs   Lab 01/28/19  1151 01/28/19  1159 01/29/19  0455 01/30/19  0351   WBC 16.38*  --  16.13* 14.71*   HGB 15.3  --  14.0 12.6*   HCT 47.8 45 43.7 40.3     --  252 210     All pertinent labs within the past 24 hours have been reviewed.    Significant Imaging: I have reviewed all pertinent imaging results/findings within the past 24 hours.     CT Abdo/Pelvis 1/28/19  1. In this patient with known sigmoid diverticulitis.  There is increased inflammation in the region, with an increase in relatively disorganized fluid.  There is 1 small focus of possible early fluid organization/abscess measuring approximately 2.3 x 1.1 cm.  No definite air within the fluid collection.  Scattered foci of air are noted about the region of diverticulitis suggesting sequela of micro perforation.  In comparison to the previous exam, this has increased.  Continued follow-up advised.  2. Hepatic steatosis, correlation with LFTs advised.  3.  Additional findings above.

## 2019-01-30 NOTE — CARE UPDATE
Chart check completed, abnormal VS noted, bedside RNYenny contacted, no concerns verbalized at this time, instructed to call [phone] for further concerns or assistance.

## 2019-01-31 ENCOUNTER — ANESTHESIA EVENT (OUTPATIENT)
Dept: SURGERY | Facility: HOSPITAL | Age: 44
DRG: 329 | End: 2019-01-31
Payer: COMMERCIAL

## 2019-01-31 LAB
ABO + RH BLD: NORMAL
ANION GAP SERPL CALC-SCNC: 9 MMOL/L
BASOPHILS # BLD AUTO: 0.05 K/UL
BASOPHILS NFR BLD: 0.3 %
BLD GP AB SCN CELLS X3 SERPL QL: NORMAL
BUN SERPL-MCNC: 10 MG/DL
CALCIUM SERPL-MCNC: 9.3 MG/DL
CHLORIDE SERPL-SCNC: 103 MMOL/L
CO2 SERPL-SCNC: 23 MMOL/L
CREAT SERPL-MCNC: 0.9 MG/DL
CRP SERPL-MCNC: 359.4 MG/L
DIFFERENTIAL METHOD: ABNORMAL
EOSINOPHIL # BLD AUTO: 0.2 K/UL
EOSINOPHIL NFR BLD: 1.4 %
ERYTHROCYTE [DISTWIDTH] IN BLOOD BY AUTOMATED COUNT: 14.5 %
EST. GFR  (AFRICAN AMERICAN): >60 ML/MIN/1.73 M^2
EST. GFR  (NON AFRICAN AMERICAN): >60 ML/MIN/1.73 M^2
GLUCOSE SERPL-MCNC: 92 MG/DL
HCT VFR BLD AUTO: 41.7 %
HGB BLD-MCNC: 13.1 G/DL
IMM GRANULOCYTES # BLD AUTO: 0.18 K/UL
IMM GRANULOCYTES NFR BLD AUTO: 1.2 %
LYMPHOCYTES # BLD AUTO: 1.1 K/UL
LYMPHOCYTES NFR BLD: 7.2 %
MAGNESIUM SERPL-MCNC: 2.1 MG/DL
MCH RBC QN AUTO: 27.8 PG
MCHC RBC AUTO-ENTMCNC: 31.4 G/DL
MCV RBC AUTO: 88 FL
MONOCYTES # BLD AUTO: 1.6 K/UL
MONOCYTES NFR BLD: 10.5 %
NEUTROPHILS # BLD AUTO: 11.7 K/UL
NEUTROPHILS NFR BLD: 79.4 %
NRBC BLD-RTO: 0 /100 WBC
PHOSPHATE SERPL-MCNC: 1.9 MG/DL
PLATELET # BLD AUTO: 254 K/UL
PMV BLD AUTO: 11.4 FL
POTASSIUM SERPL-SCNC: 4 MMOL/L
PREALB SERPL-MCNC: 8 MG/DL
RBC # BLD AUTO: 4.72 M/UL
SODIUM SERPL-SCNC: 135 MMOL/L
VANCOMYCIN TROUGH SERPL-MCNC: 3.9 UG/ML
WBC # BLD AUTO: 14.79 K/UL

## 2019-01-31 PROCEDURE — 63600175 PHARM REV CODE 636 W HCPCS: Performed by: STUDENT IN AN ORGANIZED HEALTH CARE EDUCATION/TRAINING PROGRAM

## 2019-01-31 PROCEDURE — 86140 C-REACTIVE PROTEIN: CPT

## 2019-01-31 PROCEDURE — 84134 ASSAY OF PREALBUMIN: CPT

## 2019-01-31 PROCEDURE — 36415 COLL VENOUS BLD VENIPUNCTURE: CPT

## 2019-01-31 PROCEDURE — 25000003 PHARM REV CODE 250: Performed by: NURSE PRACTITIONER

## 2019-01-31 PROCEDURE — A4216 STERILE WATER/SALINE, 10 ML: HCPCS | Performed by: NURSE PRACTITIONER

## 2019-01-31 PROCEDURE — 20600001 HC STEP DOWN PRIVATE ROOM

## 2019-01-31 PROCEDURE — 99233 PR SUBSEQUENT HOSPITAL CARE,LEVL III: ICD-10-PCS | Mod: ,,, | Performed by: INTERNAL MEDICINE

## 2019-01-31 PROCEDURE — 83735 ASSAY OF MAGNESIUM: CPT

## 2019-01-31 PROCEDURE — S0030 INJECTION, METRONIDAZOLE: HCPCS | Performed by: NURSE PRACTITIONER

## 2019-01-31 PROCEDURE — 25000003 PHARM REV CODE 250: Performed by: INTERNAL MEDICINE

## 2019-01-31 PROCEDURE — 63600175 PHARM REV CODE 636 W HCPCS: Performed by: NURSE PRACTITIONER

## 2019-01-31 PROCEDURE — 99233 SBSQ HOSP IP/OBS HIGH 50: CPT | Mod: ,,, | Performed by: INTERNAL MEDICINE

## 2019-01-31 PROCEDURE — 63600175 PHARM REV CODE 636 W HCPCS: Performed by: INTERNAL MEDICINE

## 2019-01-31 PROCEDURE — 84100 ASSAY OF PHOSPHORUS: CPT

## 2019-01-31 PROCEDURE — 80048 BASIC METABOLIC PNL TOTAL CA: CPT

## 2019-01-31 PROCEDURE — 85025 COMPLETE CBC W/AUTO DIFF WBC: CPT

## 2019-01-31 PROCEDURE — S0073 INJECTION, AZTREONAM, 500 MG: HCPCS | Performed by: INTERNAL MEDICINE

## 2019-01-31 PROCEDURE — 86850 RBC ANTIBODY SCREEN: CPT

## 2019-01-31 PROCEDURE — 25000003 PHARM REV CODE 250: Performed by: STUDENT IN AN ORGANIZED HEALTH CARE EDUCATION/TRAINING PROGRAM

## 2019-01-31 RX ORDER — VANCOMYCIN 2 GRAM/500 ML IN 0.9 % SODIUM CHLORIDE INTRAVENOUS
2000
Status: DISCONTINUED | OUTPATIENT
Start: 2019-01-31 | End: 2019-02-01

## 2019-01-31 RX ORDER — METRONIDAZOLE 500 MG/1
500 TABLET ORAL ONCE
Status: COMPLETED | OUTPATIENT
Start: 2019-01-31 | End: 2019-01-31

## 2019-01-31 RX ORDER — ACETAMINOPHEN 500 MG
1000 TABLET ORAL
Status: COMPLETED | OUTPATIENT
Start: 2019-02-01 | End: 2019-02-01

## 2019-01-31 RX ORDER — NEOMYCIN SULFATE 500 MG/1
1000 TABLET ORAL ONCE
Status: COMPLETED | OUTPATIENT
Start: 2019-01-31 | End: 2019-01-31

## 2019-01-31 RX ORDER — HEPARIN SODIUM 5000 [USP'U]/ML
5000 INJECTION, SOLUTION INTRAVENOUS; SUBCUTANEOUS
Status: COMPLETED | OUTPATIENT
Start: 2019-02-01 | End: 2019-02-01

## 2019-01-31 RX ORDER — ACETAMINOPHEN 325 MG/1
650 TABLET ORAL EVERY 6 HOURS PRN
Status: DISCONTINUED | OUTPATIENT
Start: 2019-01-31 | End: 2019-02-01

## 2019-01-31 RX ORDER — POLYETHYLENE GLYCOL 3350, SODIUM SULFATE ANHYDROUS, SODIUM BICARBONATE, SODIUM CHLORIDE, POTASSIUM CHLORIDE 236; 22.74; 6.74; 5.86; 2.97 G/4L; G/4L; G/4L; G/4L; G/4L
4000 POWDER, FOR SOLUTION ORAL ONCE
Status: COMPLETED | OUTPATIENT
Start: 2019-01-31 | End: 2019-01-31

## 2019-01-31 RX ORDER — MUPIROCIN 20 MG/G
OINTMENT TOPICAL
Status: DISCONTINUED | OUTPATIENT
Start: 2019-01-31 | End: 2019-02-01

## 2019-01-31 RX ORDER — VANCOMYCIN 2 GRAM/500 ML IN 0.9 % SODIUM CHLORIDE INTRAVENOUS
2000
Status: DISCONTINUED | OUTPATIENT
Start: 2019-01-31 | End: 2019-01-31

## 2019-01-31 RX ORDER — FLUCONAZOLE 2 MG/ML
400 INJECTION, SOLUTION INTRAVENOUS
Status: DISCONTINUED | OUTPATIENT
Start: 2019-01-31 | End: 2019-02-04

## 2019-01-31 RX ADMIN — HYDROMORPHONE HYDROCHLORIDE 1 MG: 1 INJECTION, SOLUTION INTRAMUSCULAR; INTRAVENOUS; SUBCUTANEOUS at 04:01

## 2019-01-31 RX ADMIN — HYDROMORPHONE HYDROCHLORIDE 1 MG: 1 INJECTION, SOLUTION INTRAMUSCULAR; INTRAVENOUS; SUBCUTANEOUS at 08:01

## 2019-01-31 RX ADMIN — HYDROMORPHONE HYDROCHLORIDE 1 MG: 1 INJECTION, SOLUTION INTRAMUSCULAR; INTRAVENOUS; SUBCUTANEOUS at 05:01

## 2019-01-31 RX ADMIN — ONDANSETRON 4 MG: 2 INJECTION INTRAMUSCULAR; INTRAVENOUS at 01:01

## 2019-01-31 RX ADMIN — Medication 3 ML: at 10:01

## 2019-01-31 RX ADMIN — VANCOMYCIN HYDROCHLORIDE 2000 MG: 100 INJECTION, POWDER, LYOPHILIZED, FOR SOLUTION INTRAVENOUS at 10:01

## 2019-01-31 RX ADMIN — METRONIDAZOLE 500 MG: 500 TABLET ORAL at 03:01

## 2019-01-31 RX ADMIN — AZTREONAM 2000 MG: 2 INJECTION, POWDER, LYOPHILIZED, FOR SOLUTION INTRAMUSCULAR; INTRAVENOUS at 09:01

## 2019-01-31 RX ADMIN — NEOMYCIN SULFATE 1000 MG: 500 TABLET ORAL at 10:01

## 2019-01-31 RX ADMIN — METRONIDAZOLE 500 MG: 500 TABLET ORAL at 10:01

## 2019-01-31 RX ADMIN — VANCOMYCIN HYDROCHLORIDE 2000 MG: 100 INJECTION, POWDER, LYOPHILIZED, FOR SOLUTION INTRAVENOUS at 11:01

## 2019-01-31 RX ADMIN — HYDROMORPHONE HYDROCHLORIDE 1 MG: 1 INJECTION, SOLUTION INTRAMUSCULAR; INTRAVENOUS; SUBCUTANEOUS at 11:01

## 2019-01-31 RX ADMIN — FLUCONAZOLE, SODIUM CHLORIDE 400 MG: 2 INJECTION INTRAVENOUS at 03:01

## 2019-01-31 RX ADMIN — HYDROMORPHONE HYDROCHLORIDE 1 MG: 1 INJECTION, SOLUTION INTRAMUSCULAR; INTRAVENOUS; SUBCUTANEOUS at 01:01

## 2019-01-31 RX ADMIN — Medication 3 ML: at 06:01

## 2019-01-31 RX ADMIN — METRONIDAZOLE 500 MG: 500 INJECTION, SOLUTION INTRAVENOUS at 12:01

## 2019-01-31 RX ADMIN — AZTREONAM 2000 MG: 2 INJECTION, POWDER, LYOPHILIZED, FOR SOLUTION INTRAMUSCULAR; INTRAVENOUS at 05:01

## 2019-01-31 RX ADMIN — POLYETHYLENE GLYCOL 3350, SODIUM SULFATE ANHYDROUS, SODIUM BICARBONATE, SODIUM CHLORIDE, POTASSIUM CHLORIDE 4000 ML: 236; 22.74; 6.74; 5.86; 2.97 POWDER, FOR SOLUTION ORAL at 03:01

## 2019-01-31 RX ADMIN — METRONIDAZOLE 500 MG: 500 INJECTION, SOLUTION INTRAVENOUS at 09:01

## 2019-01-31 RX ADMIN — NEOMYCIN SULFATE 1000 MG: 500 TABLET ORAL at 03:01

## 2019-01-31 RX ADMIN — ACETAMINOPHEN 650 MG: 325 TABLET, FILM COATED ORAL at 01:01

## 2019-01-31 RX ADMIN — METRONIDAZOLE 500 MG: 500 TABLET ORAL at 05:01

## 2019-01-31 RX ADMIN — METRONIDAZOLE 500 MG: 500 INJECTION, SOLUTION INTRAVENOUS at 05:01

## 2019-01-31 RX ADMIN — METRONIDAZOLE 500 MG: 500 INJECTION, SOLUTION INTRAVENOUS at 11:01

## 2019-01-31 RX ADMIN — VANCOMYCIN HYDROCHLORIDE 1500 MG: 100 INJECTION, POWDER, LYOPHILIZED, FOR SOLUTION INTRAVENOUS at 12:01

## 2019-01-31 RX ADMIN — AZTREONAM 2000 MG: 2 INJECTION, POWDER, LYOPHILIZED, FOR SOLUTION INTRAMUSCULAR; INTRAVENOUS at 02:01

## 2019-01-31 RX ADMIN — NEOMYCIN SULFATE 1000 MG: 500 TABLET ORAL at 05:01

## 2019-01-31 RX ADMIN — Medication 3 ML: at 02:01

## 2019-01-31 RX ADMIN — HYDROMORPHONE HYDROCHLORIDE 1 MG: 1 INJECTION, SOLUTION INTRAMUSCULAR; INTRAVENOUS; SUBCUTANEOUS at 12:01

## 2019-01-31 NOTE — ASSESSMENT & PLAN NOTE
"- CT shows increased inflammation in the sigmoid region, cocnern for fluid organization/abscess formation and microperforation, repeat shows evolution and worsening  - No improvement with outpatient course of PO cipro and flagyl   - ID consulted for abx recs in pt with severe PCN allergy  - Pt unsure if ever exposed to Cephalosporins  - "Failed" outpatient therapy with Cipro and Flagyl likely due to lack of source control      Plan  1. Continue Aztreonam, Flagyl and Vanc. Will add Fluconazole due to intestinal perf  2. Will avoid PCNs and any cross reactive antibiotics (Zosyn, Cephalosporins etc) and will treat outpatient management as failed    3. Can consider Minocycline if pt does not improve  4. Sigmoid colectomy tomorrow for source control   5. Will follow blood cultures    "

## 2019-01-31 NOTE — CONSULTS
Consult received on patient's ileostomy and colostomy marking. Pre-op teaching provided, answered patient's questions. Assessed patient's abdomen laying, sitting, and standing. Identified patient belt line. Marked patient with x to LLQ and RLQ. Patient denied any further questions. Ostomy dept to follow.

## 2019-01-31 NOTE — PROGRESS NOTES
[facility]  Colorectal Surgery  Progress Note    Patient Name: Kyle Abel  MRN: [id number]  Admission Date: 1/28/2019  Hospital Length of Stay: 3 days  Attending Physician: Samir Guidry MD    Subjective:     Interval History: no acute events overnite, CT 1/30/19 worse , scheduled for surgery 2/1/19    Post-Op Info:  Procedure(s) (LRB):  COLECTOMY, SIGMOID (N/A)          Medications:  Continuous Infusions:   sodium chloride 0.9% 100 mL/hr at 01/30/19 2248     Scheduled Meds:   aztreonam  2,000 mg Intravenous Q8H    metronidazole  500 mg Intravenous Q8H    metroNIDAZOLE  500 mg Oral Once    metroNIDAZOLE  500 mg Oral Once    metroNIDAZOLE  500 mg Oral Once    neomycin  1,000 mg Oral Once    neomycin  1,000 mg Oral Once    neomycin  1,000 mg Oral Once    polyethylene glycol  4,000 mL Oral Once    sodium chloride 0.9%  3 mL Intravenous Q8H    vancomycin (VANCOCIN) IVPB  2,000 mg Intravenous Q12H     PRN Meds:   acetaminophen    HYDROmorphone    naloxone    ondansetron    promethazine (PHENERGAN) IVPB    sodium chloride 0.9%        Objective:     Vital Signs (Most Recent):  Temp: 99.7 °F (37.6 °C) (01/31/19 1148)  Pulse: 85 (01/31/19 1148)  Resp: 18 (01/31/19 1148)  BP: 114/69 (01/31/19 1148)  SpO2: 98 % (01/31/19 1148) Vital Signs (24h Range):  Temp:  [99 °F (37.2 °C)-101.9 °F (38.8 °C)] 99.7 °F (37.6 °C)  Pulse:  [] 85  Resp:  [16-20] 18  SpO2:  [95 %-98 %] 98 %  BP: (114-123)/(69-78) 114/69     Intake/Output - Last 3 Shifts       01/29 0700 - 01/30 0659 01/30 0700 - 01/31 0659 01/31 0700 - 02/01 0659    P.O. 60 0     I.V. (mL/kg) 2843.3 (22.5) 1483.3 (11.8)     IV Piggyback 1050 750     Total Intake(mL/kg) 3953.3 (31.4) 2233.3 (17.7)     Urine (mL/kg/hr)       Total Output       Net +3953.3 +2233.3            Urine Occurrence 8 x 8 x     Stool Occurrence 3 x 4 x     Emesis Occurrence  0 x           Physical Exam   Constitutional: He is oriented to person, place, and  time. He appears well-developed and well-nourished. No distress.   Cardiovascular: Normal rate, regular rhythm and normal heart sounds.   Pulmonary/Chest: Effort normal and breath sounds normal. No respiratory distress. He has no wheezes. He has no rales.   Wears CPAP   Abdominal: Soft. He exhibits no distension and no mass. There is tenderness. There is no guarding.   Musculoskeletal: Normal range of motion.   Neurological: He is alert and oriented to person, place, and time.   Skin: Skin is warm and dry.   Psychiatric: He has a normal mood and affect. His behavior is normal. Judgment and thought content normal.   Nursing note and vitals reviewed.        Significant Labs:  BMP (Last 3 Results):   Recent Labs   Lab 01/29/19  0454 01/30/19  0351 01/31/19  0738    367* 92    130* 135*   K 4.2 5.4* 4.0    102 103   CO2 23 23 23   BUN 9 8 10   CREATININE 0.9 1.0 0.9   CALCIUM 9.2 8.5* 9.3   MG 2.2 1.8 2.1     CBC (Last 3 Results):   Recent Labs   Lab 01/29/19  0455 01/30/19  0351 01/31/19  0738   WBC 16.13* 14.71* 14.79*   RBC 4.99 4.43* 4.72   HGB 14.0 12.6* 13.1*   HCT 43.7 40.3 41.7    210 254   MCV 88 91 88   MCH 28.1 28.4 27.8   MCHC 32.0 31.3* 31.4*       Significant Diagnostics:      Assessment/Plan:     * Sigmoid diverticulitis    43 year old male failed outpt treatment for diverticulitis    -  -npo  -may have ice sparingly  -per recs from ID, vanc, azto and flagy  -nunez OR in am, bowel and antibiotics prep  -seerial abd exams  -daily labs  -no improvement in abd  Pain  -pt and family agree with plans              Lorenza Reynolds NP  Colorectal Surgery  [facility]

## 2019-01-31 NOTE — PLAN OF CARE
Problem: Adult Inpatient Plan of Care  Goal: Plan of Care Review  Outcome: Ongoing (interventions implemented as appropriate)  Plan of care reviewed with patient. Patient verbalized understanding. Patient is oriented x4. Patient tolerated NPO-ice chips. Patient skin remained intact. Patient did received prn pain medication per MAR. Patient ambulated to the bathroom with pain to have bowel movements all shift. Patient remained free of any falls or acute events. VSS, Will continue to monitor.

## 2019-01-31 NOTE — ANESTHESIA PREPROCEDURE EVALUATION
[facility]  Anesthesia Pre-Operative Evaluation         Patient Name: Kyle Abel  YOB: 1975  MRN: [id number]    SUBJECTIVE:     Pre-operative evaluation for Procedure(s) (LRB):  COLECTOMY, SIGMOID (N/A)     01/31/2019    Kyle Abel is a 43 y.o. male w/ a significant PMHx of HTN, Obesity, EDUARDO (CPAP QHS) currently admitted with acute sigmoid diverticulitis with microperforation and abscess formation.     Antimicrobials: Vancomycin / Aztreonam / Flagyl    Patient now presents for the above procedure(s).    LDA:        Peripheral IV - Single Lumen 01/28/19 1945 Left Hand (Active)   Site Assessment Dry;Clean;Intact;No redness;No swelling 1/30/2019  8:00 PM   Line Status Flushed 1/30/2019  8:00 PM   Dressing Status Dry;Intact;Clean;Biopatch in place 1/30/2019  8:00 PM   Dressing Change Due 02/01/19 1/30/2019  8:00 AM   Site Change Due 02/01/19 1/30/2019  8:00 AM   Reason Not Rotated Not due 1/30/2019  8:00 PM   Number of days: 2            Peripheral IV - Single Lumen 01/28/19 1946 Right Antecubital (Active)   Site Assessment Dry;Clean;Intact;No redness 1/30/2019  8:00 PM   Line Status Saline locked 1/30/2019  8:00 PM   Dressing Status Dry;Clean;Intact;Biopatch in place 1/30/2019  8:00 PM   Dressing Change Due 02/01/19 1/30/2019  8:00 AM   Site Change Due 02/01/19 1/30/2019  8:00 AM   Reason Not Rotated Not due 1/30/2019  8:00 PM   Number of days: 2       Prev airway: None documented.    Drips:    sodium chloride 0.9% 100 mL/hr at 01/30/19 1408       Patient Active Problem List   Diagnosis    Rectal bleeding    Internal hemorrhoid, bleeding    Colon polyps    Hypertriglyceridemia    Low HDL (under 40)    Mild anemia    Hypopigmented skin lesion    Sigmoid diverticulitis       Review of patient's allergies indicates:   Allergen Reactions    Penicillins Anaphylaxis and Swelling     As a child age  11       Current Inpatient Medications:   aztreonam  2,000 mg Intravenous Q8H    metronidazole  500 mg Intravenous Q8H    metroNIDAZOLE  500 mg Oral Once    metroNIDAZOLE  500 mg Oral Once    metroNIDAZOLE  500 mg Oral Once    neomycin  1,000 mg Oral Once    neomycin  1,000 mg Oral Once    neomycin  1,000 mg Oral Once    polyethylene glycol  4,000 mL Oral Once    sodium chloride 0.9%  3 mL Intravenous Q8H    vancomycin (VANCOCIN) IVPB  2,000 mg Intravenous Q12H       No current facility-administered medications on file prior to encounter.      Current Outpatient Medications on File Prior to Encounter   Medication Sig Dispense Refill    ciprofloxacin HCl (CIPRO) 500 MG tablet Take 1 tablet (500 mg total) by mouth every 12 (twelve) hours. for 7 days 14 tablet 0    metroNIDAZOLE (FLAGYL) 500 MG tablet Take 1 tablet (500 mg total) by mouth every 6 (six) hours. for 7 days 28 tablet 0       Past Surgical History:   Procedure Laterality Date    COLONOSCOPY N/A 2017    Performed by Chapin Hernandez [facility] at [facility]       Social History     Socioeconomic History    Marital status:      Spouse name: Not on file    Number of children: Not on file    Years of education: Not on file    Highest education level: Not on file   Social Needs    Financial resource strain: Not on file    Food insecurity - worry: Not on file    Food insecurity - inability: Not on file    Transportation needs - medical: Not on file    Transportation needs - non-medical: Not on file   Occupational History    Occupation:      Employer: [employer]    Tobacco Use    Smoking status: Former Smoker     Packs/day: 1.50     Years: 25.00     Pack years: 37.50     Types: Cigarettes     Last attempt to quit: 6/10/2018     Years since quittin.6    Smokeless tobacco: Current User     Types: Chew   Substance and Sexual Activity    Alcohol use: Yes     Comment: every weekend - 4 drinks either beer or  vodka per occasion    Drug use: No    Sexual activity: Yes   Other Topics Concern    Not on file   Social History Narrative    Not on file       OBJECTIVE:     Vital Signs Range (Last 24H):  Temp:  [37.2 °C (99 °F)-38.8 °C (101.9 °F)]   Pulse:  []   Resp:  [16-20]   BP: (115-123)/(63-78)   SpO2:  [95 %-97 %]       Significant Labs:  Lab Results   Component Value Date    WBC 14.79 (H) 01/31/2019    HGB 13.1 (L) 01/31/2019    HCT 41.7 01/31/2019     01/31/2019    CHOL 134 06/12/2018    TRIG 199 (H) 06/12/2018    HDL 30 (L) 06/12/2018    ALT 55 (H) 01/28/2019    AST 24 01/28/2019     (L) 01/31/2019    K 4.0 01/31/2019     01/31/2019    CREATININE 0.9 01/31/2019    BUN 10 01/31/2019    CO2 23 01/31/2019    TSH 1.978 06/12/2018    INR 1.0 09/17/2012       Diagnostic Studies: No relevant studies.    EKG: No recent studies available.    2D ECHO:  No results found for this or any previous visit.      ASSESSMENT/PLAN:         Anesthesia Evaluation    I have reviewed the Patient Summary Reports.    I have reviewed the Nursing Notes.   I have reviewed the Medications.     Review of Systems  Anesthesia Hx:  No previous Anesthesia  Neg history of prior surgery. Denies Family Hx of Anesthesia complications.    Social:  Smoker, Social Alcohol Use Quit smoking 14 days ago; prior to quitting smoke 1 ppd   Hematology/Oncology:  Hematology Normal   Oncology Normal     EENT/Dental:EENT/Dental Normal   Cardiovascular:   Hypertension Denies MI.    Denies Angina.    Pulmonary:   Denies COPD.  Denies Asthma. Sleep Apnea, CPAP    Renal/:  Renal/ Normal     Hepatic/GI:   GERD, well controlled    Musculoskeletal:  Musculoskeletal Normal    Neurological:  Neurology Normal  Denies CVA. Denies Seizures.    Endocrine:  Endocrine Normal Denies Diabetes.    Psych:  Psychiatric Normal           Physical Exam  General:  Well nourished, Obesity    Airway/Jaw/Neck:  Airway Findings: Mouth Opening: Normal Tongue: Normal   General Airway Assessment: Adult, Possible difficult mask airway  +beard  Mallampati: II  Improves to I with phonation.  TM Distance: Normal, at least 6 cm  Jaw/Neck Findings:  Neck ROM: Normal ROM  Neck Findings:  Girth Increased     Eyes/Ears/Nose:  EYES/EARS/NOSE FINDINGS: Normal   Dental:  Dental Findings: In tact   Chest/Lungs:  Chest/Lungs Findings: Clear to auscultation, Normal Respiratory Rate     Heart/Vascular:  Heart Findings: Rate: Normal  Rhythm: Regular Rhythm        Mental Status:  Mental Status Findings:  Cooperative, Alert and Oriented         Anesthesia Plan  Type of Anesthesia, risks & benefits discussed:  Anesthesia Type:  general  Patient's Preference:   Intra-op Monitoring Plan: arterial line and standard ASA monitors  Intra-op Monitoring Plan Comments:   Post Op Pain Control Plan: multimodal analgesia, per primary service following discharge from PACU and IV/PO Opioids PRN  Post Op Pain Control Plan Comments:   Induction:   IV  Beta Blocker:  Patient is not currently on a Beta-Blocker (No further documentation required).       Informed Consent: Patient understands risks and agrees with Anesthesia plan.  Questions answered. Anesthesia consent signed with patient.  ASA Score: 3     Day of Surgery Review of History & Physical:    H&P update referred to the provider.         Ready For Surgery From Anesthesia Perspective.

## 2019-01-31 NOTE — ASSESSMENT & PLAN NOTE
43 year old male failed outpt treatment for diverticulitis    -  -npo  -may have ice sparingly  -per recs from ID, vanc, azto and flagy  -nunez OR in am, bowel and antibiotics prep  -seerial abd exams  -daily labs  -no improvement in abd  Pain  -pt and family agree with plans

## 2019-01-31 NOTE — PROGRESS NOTES
"[facility]  Infectious Disease  Progress Note    Patient Name: Kyle Abel  MRN: [id number]  Admission Date: 1/28/2019  Length of Stay: 3 days  Attending Physician: Samir Guidry MD  Primary Care Provider: Gilma Cuba NP    Isolation Status: No active isolations  Assessment/Plan:      * Sigmoid diverticulitis    - CT shows increased inflammation in the sigmoid region, cocnern for fluid organization/abscess formation and microperforation, repeat shows evolution and worsening  - No improvement with outpatient course of PO cipro and flagyl   - ID consulted for abx recs in pt with severe PCN allergy  - Pt unsure if ever exposed to Cephalosporins  - "Failed" outpatient therapy with Cipro and Flagyl likely due to lack of source control      Plan  1. Continue Aztreonam, Flagyl and Vanc. Will add Fluconazole due to intestinal perf  2. Will avoid PCNs and any cross reactive antibiotics (Zosyn, Cephalosporins etc) and will treat outpatient management as failed    3. Can consider Minocycline if pt does not improve  4. Sigmoid colectomy tomorrow for source control   5. Will follow blood cultures             Thank you for your consult. I will follow-up with patient. Please contact us if you have any additional questions.    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 Pt initially presented to [facility] ED 1/20/19 with abdominal pain. CT showed sigmoid diverticulitis at the time. Pt was discharged home with a course of PO Cipro and Flagyl with which he endorse compliance. Pt states that he initially felt better with the antibiotics but acutely worsened on 1/26. Pt represented to [facility] ED on 1/28 with increasing abdominal pain and low grade fevers (Home Tmax 100.4).     Repeat CT shows " ""increased inflammation in the region opf known sigmoid diversticulits, with an increase in relatively disorganized fluid.  There is 1 small focus of possible early fluid organization/abscess measuring approximately 2.3 x 1.1 cm.  No definite air within the fluid collection.  Scattered foci of air are noted about the region of diverticulitis suggesting sequela of micro perforation.  In comparison to the previous exam, this has increased.     Pt was admitted to Colorectal surgery and ID was consulted for antibiotic recommendations in pt with PCN allergy.     In ED, pt was started on Aztreonam 1g q8, Flagyl 500mg q8, Vancomycin 1.5g q12.   Interval History: CT overnight shows ongoing concern for abscess and bowel perforation. Scheduled for sigmoid colectomy tomorrow. Continue current abx regimen, will add fluconazole due to intestinal perforation.     Review of Systems   Constitutional: Positive for appetite change and fever. Negative for activity change, chills and fatigue.   Respiratory: Negative for cough, chest tightness, shortness of breath and wheezing.    Cardiovascular: Negative for chest pain and leg swelling.   Gastrointestinal: Positive for abdominal pain. Negative for abdominal distention, anal bleeding, blood in stool, constipation, diarrhea, nausea, rectal pain and vomiting.   Genitourinary: Negative for difficulty urinating, enuresis, flank pain, frequency, genital sores and hematuria.   Musculoskeletal: Negative for back pain, gait problem and joint swelling.   Skin: Negative.  Negative for color change.   Neurological: Negative for dizziness, syncope and numbness.   Psychiatric/Behavioral: Negative for agitation, confusion, decreased concentration, dysphoric mood and hallucinations. The patient is not nervous/anxious and is not hyperactive.      Objective:     Vital Signs (Most Recent):  Temp: 99.7 °F (37.6 °C) (01/31/19 1148)  Pulse: 85 (01/31/19 1148)  Resp: 18 (01/31/19 1148)  BP: 114/69 (01/31/19 " 1148)  SpO2: 98 % (01/31/19 1148) Vital Signs (24h Range):  Temp:  [99 °F (37.2 °C)-101.9 °F (38.8 °C)] 99.7 °F (37.6 °C)  Pulse:  [] 85  Resp:  [16-20] 18  SpO2:  [95 %-98 %] 98 %  BP: (114-123)/(69-78) 114/69     Weight: 126.1 kg (278 lb)  Body mass index is 39.89 kg/m².    Estimated Creatinine Clearance: 142.5 mL/min (based on SCr of 0.9 mg/dL).    Physical Exam   Constitutional: He is oriented to person, place, and time. He appears well-developed and well-nourished. No distress.   HENT:   Head: Normocephalic and atraumatic.   Eyes: EOM are normal. Pupils are equal, round, and reactive to light.   Cardiovascular: Normal rate, regular rhythm and normal heart sounds.   Pulmonary/Chest: Effort normal and breath sounds normal. No respiratory distress. He has no wheezes. He has no rales.   Abdominal: Soft. He exhibits no distension and no mass. There is tenderness. There is no guarding.   Pain with palpation to entire abdomen, worse in suprapubic region, LLQ and LUQ   Musculoskeletal: Normal range of motion.   Neurological: He is alert and oriented to person, place, and time.   Skin: Skin is warm and dry.   Psychiatric: He has a normal mood and affect. His behavior is normal. Judgment and thought content normal.   Nursing note and vitals reviewed.      Significant Labs:   Blood Culture:   Recent Labs   Lab 01/28/19  1356 01/28/19  1409   LABBLOO No Growth to date  No Growth to date  No Growth to date No Growth to date  No Growth to date  No Growth to date     BMP:   Recent Labs   Lab 01/31/19  0738   GLU 92   *   K 4.0      CO2 23   BUN 10   CREATININE 0.9   CALCIUM 9.3   MG 2.1     CBC:   Recent Labs   Lab 01/30/19  0351 01/31/19  0738   WBC 14.71* 14.79*   HGB 12.6* 13.1*   HCT 40.3 41.7    254     All pertinent labs within the past 24 hours have been reviewed.    Significant Imaging: I have reviewed all pertinent imaging results/findings within the past 24 hours.     CT Abdo/Pelvis  1/28/19  1. In this patient with known sigmoid diverticulitis.  There is increased inflammation in the region, with an increase in relatively disorganized fluid.  There is 1 small focus of possible early fluid organization/abscess measuring approximately 2.3 x 1.1 cm.  No definite air within the fluid collection.  Scattered foci of air are noted about the region of diverticulitis suggesting sequela of micro perforation.  In comparison to the previous exam, this has increased.  Continued follow-up advised.  2. Hepatic steatosis, correlation with LFTs advised.  3. Additional findings above.

## 2019-01-31 NOTE — SUBJECTIVE & OBJECTIVE
Subjective:     Interval History: no acute events ismaelte, CT 1/30/19 worse , scheduled for surgery 2/1/19    Post-Op Info:  Procedure(s) (LRB):  COLECTOMY, SIGMOID (N/A)          Medications:  Continuous Infusions:   sodium chloride 0.9% 100 mL/hr at 01/30/19 2248     Scheduled Meds:   aztreonam  2,000 mg Intravenous Q8H    metronidazole  500 mg Intravenous Q8H    metroNIDAZOLE  500 mg Oral Once    metroNIDAZOLE  500 mg Oral Once    metroNIDAZOLE  500 mg Oral Once    neomycin  1,000 mg Oral Once    neomycin  1,000 mg Oral Once    neomycin  1,000 mg Oral Once    polyethylene glycol  4,000 mL Oral Once    sodium chloride 0.9%  3 mL Intravenous Q8H    vancomycin (VANCOCIN) IVPB  2,000 mg Intravenous Q12H     PRN Meds:   acetaminophen    HYDROmorphone    naloxone    ondansetron    promethazine (PHENERGAN) IVPB    sodium chloride 0.9%        Objective:     Vital Signs (Most Recent):  Temp: 99.7 °F (37.6 °C) (01/31/19 1148)  Pulse: 85 (01/31/19 1148)  Resp: 18 (01/31/19 1148)  BP: 114/69 (01/31/19 1148)  SpO2: 98 % (01/31/19 1148) Vital Signs (24h Range):  Temp:  [99 °F (37.2 °C)-101.9 °F (38.8 °C)] 99.7 °F (37.6 °C)  Pulse:  [] 85  Resp:  [16-20] 18  SpO2:  [95 %-98 %] 98 %  BP: (114-123)/(69-78) 114/69     Intake/Output - Last 3 Shifts       01/29 0700 - 01/30 0659 01/30 0700 - 01/31 0659 01/31 0700 - 02/01 0659    P.O. 60 0     I.V. (mL/kg) 2843.3 (22.5) 1483.3 (11.8)     IV Piggyback 1050 750     Total Intake(mL/kg) 3953.3 (31.4) 2233.3 (17.7)     Urine (mL/kg/hr)       Total Output       Net +3953.3 +2233.3            Urine Occurrence 8 x 8 x     Stool Occurrence 3 x 4 x     Emesis Occurrence  0 x           Physical Exam   Constitutional: He is oriented to person, place, and time. He appears well-developed and well-nourished. No distress.   Cardiovascular: Normal rate, regular rhythm and normal heart sounds.   Pulmonary/Chest: Effort normal and breath sounds normal. No respiratory  distress. He has no wheezes. He has no rales.   Wears CPAP   Abdominal: Soft. He exhibits no distension and no mass. There is tenderness. There is no guarding.   Musculoskeletal: Normal range of motion.   Neurological: He is alert and oriented to person, place, and time.   Skin: Skin is warm and dry.   Psychiatric: He has a normal mood and affect. His behavior is normal. Judgment and thought content normal.   Nursing note and vitals reviewed.        Significant Labs:  BMP (Last 3 Results):   Recent Labs   Lab 01/29/19  0454 01/30/19  0351 01/31/19  0738    367* 92    130* 135*   K 4.2 5.4* 4.0    102 103   CO2 23 23 23   BUN 9 8 10   CREATININE 0.9 1.0 0.9   CALCIUM 9.2 8.5* 9.3   MG 2.2 1.8 2.1     CBC (Last 3 Results):   Recent Labs   Lab 01/29/19 0455 01/30/19  0351 01/31/19  0738   WBC 16.13* 14.71* 14.79*   RBC 4.99 4.43* 4.72   HGB 14.0 12.6* 13.1*   HCT 43.7 40.3 41.7    210 254   MCV 88 91 88   MCH 28.1 28.4 27.8   MCHC 32.0 31.3* 31.4*       Significant Diagnostics:

## 2019-01-31 NOTE — SUBJECTIVE & OBJECTIVE
Interval History: CT overnight shows ongoing concern for abscess and bowel perforation. Scheduled for sigmoid colectomy tomorrow. Continue current abx regimen, will add fluconazole due to intestinal perforation.     Review of Systems   Constitutional: Positive for appetite change and fever. Negative for activity change, chills and fatigue.   Respiratory: Negative for cough, chest tightness, shortness of breath and wheezing.    Cardiovascular: Negative for chest pain and leg swelling.   Gastrointestinal: Positive for abdominal pain. Negative for abdominal distention, anal bleeding, blood in stool, constipation, diarrhea, nausea, rectal pain and vomiting.   Genitourinary: Negative for difficulty urinating, enuresis, flank pain, frequency, genital sores and hematuria.   Musculoskeletal: Negative for back pain, gait problem and joint swelling.   Skin: Negative.  Negative for color change.   Neurological: Negative for dizziness, syncope and numbness.   Psychiatric/Behavioral: Negative for agitation, confusion, decreased concentration, dysphoric mood and hallucinations. The patient is not nervous/anxious and is not hyperactive.      Objective:     Vital Signs (Most Recent):  Temp: 99.7 °F (37.6 °C) (01/31/19 1148)  Pulse: 85 (01/31/19 1148)  Resp: 18 (01/31/19 1148)  BP: 114/69 (01/31/19 1148)  SpO2: 98 % (01/31/19 1148) Vital Signs (24h Range):  Temp:  [99 °F (37.2 °C)-101.9 °F (38.8 °C)] 99.7 °F (37.6 °C)  Pulse:  [] 85  Resp:  [16-20] 18  SpO2:  [95 %-98 %] 98 %  BP: (114-123)/(69-78) 114/69     Weight: 126.1 kg (278 lb)  Body mass index is 39.89 kg/m².    Estimated Creatinine Clearance: 142.5 mL/min (based on SCr of 0.9 mg/dL).    Physical Exam   Constitutional: He is oriented to person, place, and time. He appears well-developed and well-nourished. No distress.   HENT:   Head: Normocephalic and atraumatic.   Eyes: EOM are normal. Pupils are equal, round, and reactive to light.   Cardiovascular: Normal rate,  regular rhythm and normal heart sounds.   Pulmonary/Chest: Effort normal and breath sounds normal. No respiratory distress. He has no wheezes. He has no rales.   Abdominal: Soft. He exhibits no distension and no mass. There is tenderness. There is no guarding.   Pain with palpation to entire abdomen, worse in suprapubic region, LLQ and LUQ   Musculoskeletal: Normal range of motion.   Neurological: He is alert and oriented to person, place, and time.   Skin: Skin is warm and dry.   Psychiatric: He has a normal mood and affect. His behavior is normal. Judgment and thought content normal.   Nursing note and vitals reviewed.      Significant Labs:   Blood Culture:   Recent Labs   Lab 01/28/19  1356 01/28/19  1409   LABBLOO No Growth to date  No Growth to date  No Growth to date No Growth to date  No Growth to date  No Growth to date     BMP:   Recent Labs   Lab 01/31/19  0738   GLU 92   *   K 4.0      CO2 23   BUN 10   CREATININE 0.9   CALCIUM 9.3   MG 2.1     CBC:   Recent Labs   Lab 01/30/19  0351 01/31/19  0738   WBC 14.71* 14.79*   HGB 12.6* 13.1*   HCT 40.3 41.7    254     All pertinent labs within the past 24 hours have been reviewed.    Significant Imaging: I have reviewed all pertinent imaging results/findings within the past 24 hours.     CT Abdo/Pelvis 1/28/19  1. In this patient with known sigmoid diverticulitis.  There is increased inflammation in the region, with an increase in relatively disorganized fluid.  There is 1 small focus of possible early fluid organization/abscess measuring approximately 2.3 x 1.1 cm.  No definite air within the fluid collection.  Scattered foci of air are noted about the region of diverticulitis suggesting sequela of micro perforation.  In comparison to the previous exam, this has increased.  Continued follow-up advised.  2. Hepatic steatosis, correlation with LFTs advised.  3. Additional findings above.

## 2019-02-01 ENCOUNTER — ANESTHESIA (OUTPATIENT)
Dept: SURGERY | Facility: HOSPITAL | Age: 44
DRG: 329 | End: 2019-02-01
Payer: COMMERCIAL

## 2019-02-01 LAB
ANION GAP SERPL CALC-SCNC: 9 MMOL/L
BASOPHILS # BLD AUTO: 0.03 K/UL
BASOPHILS NFR BLD: 0.3 %
BUN SERPL-MCNC: 11 MG/DL
CALCIUM SERPL-MCNC: 8.7 MG/DL
CHLORIDE SERPL-SCNC: 103 MMOL/L
CO2 SERPL-SCNC: 24 MMOL/L
CREAT SERPL-MCNC: 0.9 MG/DL
CRP SERPL-MCNC: 266.5 MG/L
DIFFERENTIAL METHOD: ABNORMAL
EOSINOPHIL # BLD AUTO: 0.2 K/UL
EOSINOPHIL NFR BLD: 2 %
ERYTHROCYTE [DISTWIDTH] IN BLOOD BY AUTOMATED COUNT: 14.5 %
EST. GFR  (AFRICAN AMERICAN): >60 ML/MIN/1.73 M^2
EST. GFR  (NON AFRICAN AMERICAN): >60 ML/MIN/1.73 M^2
GLUCOSE SERPL-MCNC: 81 MG/DL
HCT VFR BLD AUTO: 35.3 %
HGB BLD-MCNC: 10.9 G/DL
IMM GRANULOCYTES # BLD AUTO: 0.14 K/UL
IMM GRANULOCYTES NFR BLD AUTO: 1.3 %
LYMPHOCYTES # BLD AUTO: 1.2 K/UL
LYMPHOCYTES NFR BLD: 11.1 %
MAGNESIUM SERPL-MCNC: 2.1 MG/DL
MCH RBC QN AUTO: 27.5 PG
MCHC RBC AUTO-ENTMCNC: 30.9 G/DL
MCV RBC AUTO: 89 FL
MONOCYTES # BLD AUTO: 1.3 K/UL
MONOCYTES NFR BLD: 11.6 %
NEUTROPHILS # BLD AUTO: 8 K/UL
NEUTROPHILS NFR BLD: 73.7 %
NRBC BLD-RTO: 0 /100 WBC
PHOSPHATE SERPL-MCNC: 3.4 MG/DL
PLATELET # BLD AUTO: 248 K/UL
PMV BLD AUTO: 10.9 FL
POTASSIUM SERPL-SCNC: 4 MMOL/L
RBC # BLD AUTO: 3.96 M/UL
SODIUM SERPL-SCNC: 136 MMOL/L
VANCOMYCIN TROUGH SERPL-MCNC: 6.2 UG/ML
WBC # BLD AUTO: 10.8 K/UL

## 2019-02-01 PROCEDURE — 87070 CULTURE OTHR SPECIMN AEROBIC: CPT

## 2019-02-01 PROCEDURE — 63600175 PHARM REV CODE 636 W HCPCS: Performed by: NURSE ANESTHETIST, CERTIFIED REGISTERED

## 2019-02-01 PROCEDURE — 63600175 PHARM REV CODE 636 W HCPCS: Performed by: ANESTHESIOLOGY

## 2019-02-01 PROCEDURE — 20600001 HC STEP DOWN PRIVATE ROOM

## 2019-02-01 PROCEDURE — S0073 INJECTION, AZTREONAM, 500 MG: HCPCS | Performed by: INTERNAL MEDICINE

## 2019-02-01 PROCEDURE — A4216 STERILE WATER/SALINE, 10 ML: HCPCS | Performed by: NURSE PRACTITIONER

## 2019-02-01 PROCEDURE — 25000003 PHARM REV CODE 250: Performed by: STUDENT IN AN ORGANIZED HEALTH CARE EDUCATION/TRAINING PROGRAM

## 2019-02-01 PROCEDURE — S0030 INJECTION, METRONIDAZOLE: HCPCS | Performed by: NURSE PRACTITIONER

## 2019-02-01 PROCEDURE — 44143 PARTIAL REMOVAL OF COLON: CPT | Mod: ,,, | Performed by: COLON & RECTAL SURGERY

## 2019-02-01 PROCEDURE — 71000039 HC RECOVERY, EACH ADD'L HOUR: Performed by: COLON & RECTAL SURGERY

## 2019-02-01 PROCEDURE — 36000709 HC OR TIME LEV III EA ADD 15 MIN: Performed by: COLON & RECTAL SURGERY

## 2019-02-01 PROCEDURE — 25000242 PHARM REV CODE 250 ALT 637 W/ HCPCS: Performed by: NURSE ANESTHETIST, CERTIFIED REGISTERED

## 2019-02-01 PROCEDURE — 44143 PR PART REMOVAL COLON W END COLOSTOMY: ICD-10-PCS | Mod: ,,, | Performed by: COLON & RECTAL SURGERY

## 2019-02-01 PROCEDURE — 36415 COLL VENOUS BLD VENIPUNCTURE: CPT

## 2019-02-01 PROCEDURE — 83735 ASSAY OF MAGNESIUM: CPT

## 2019-02-01 PROCEDURE — 27201037 HC PRESSURE MONITORING SET UP

## 2019-02-01 PROCEDURE — 87102 FUNGUS ISOLATION CULTURE: CPT

## 2019-02-01 PROCEDURE — 63600175 PHARM REV CODE 636 W HCPCS: Performed by: STUDENT IN AN ORGANIZED HEALTH CARE EDUCATION/TRAINING PROGRAM

## 2019-02-01 PROCEDURE — 63600175 PHARM REV CODE 636 W HCPCS: Performed by: NURSE PRACTITIONER

## 2019-02-01 PROCEDURE — 80202 ASSAY OF VANCOMYCIN: CPT

## 2019-02-01 PROCEDURE — 63600175 PHARM REV CODE 636 W HCPCS: Performed by: COLON & RECTAL SURGERY

## 2019-02-01 PROCEDURE — 25000003 PHARM REV CODE 250: Performed by: NURSE PRACTITIONER

## 2019-02-01 PROCEDURE — 25000003 PHARM REV CODE 250: Performed by: NURSE ANESTHETIST, CERTIFIED REGISTERED

## 2019-02-01 PROCEDURE — D9220A PRA ANESTHESIA: Mod: ANES,,, | Performed by: ANESTHESIOLOGY

## 2019-02-01 PROCEDURE — C1765 ADHESION BARRIER: HCPCS | Performed by: COLON & RECTAL SURGERY

## 2019-02-01 PROCEDURE — 25000003 PHARM REV CODE 250: Performed by: COLON & RECTAL SURGERY

## 2019-02-01 PROCEDURE — 37000008 HC ANESTHESIA 1ST 15 MINUTES: Performed by: COLON & RECTAL SURGERY

## 2019-02-01 PROCEDURE — 25000003 PHARM REV CODE 250: Performed by: REGISTERED NURSE

## 2019-02-01 PROCEDURE — 63600175 PHARM REV CODE 636 W HCPCS: Performed by: REGISTERED NURSE

## 2019-02-01 PROCEDURE — 27201423 OPTIME MED/SURG SUP & DEVICES STERILE SUPPLY: Performed by: COLON & RECTAL SURGERY

## 2019-02-01 PROCEDURE — 99233 SBSQ HOSP IP/OBS HIGH 50: CPT | Mod: ,,, | Performed by: INTERNAL MEDICINE

## 2019-02-01 PROCEDURE — 71000033 HC RECOVERY, INTIAL HOUR: Performed by: COLON & RECTAL SURGERY

## 2019-02-01 PROCEDURE — 85025 COMPLETE CBC W/AUTO DIFF WBC: CPT

## 2019-02-01 PROCEDURE — 99233 PR SUBSEQUENT HOSPITAL CARE,LEVL III: ICD-10-PCS | Mod: ,,, | Performed by: INTERNAL MEDICINE

## 2019-02-01 PROCEDURE — D9220A PRA ANESTHESIA: ICD-10-PCS | Mod: CRNA,,, | Performed by: NURSE ANESTHETIST, CERTIFIED REGISTERED

## 2019-02-01 PROCEDURE — 80048 BASIC METABOLIC PNL TOTAL CA: CPT

## 2019-02-01 PROCEDURE — 86140 C-REACTIVE PROTEIN: CPT

## 2019-02-01 PROCEDURE — 84100 ASSAY OF PHOSPHORUS: CPT

## 2019-02-01 PROCEDURE — 88307 TISSUE EXAM BY PATHOLOGIST: CPT | Mod: 26,,, | Performed by: PATHOLOGY

## 2019-02-01 PROCEDURE — 25000003 PHARM REV CODE 250: Performed by: INTERNAL MEDICINE

## 2019-02-01 PROCEDURE — 36000708 HC OR TIME LEV III 1ST 15 MIN: Performed by: COLON & RECTAL SURGERY

## 2019-02-01 PROCEDURE — D9220A PRA ANESTHESIA: Mod: CRNA,,, | Performed by: NURSE ANESTHETIST, CERTIFIED REGISTERED

## 2019-02-01 PROCEDURE — S0020 INJECTION, BUPIVICAINE HYDRO: HCPCS | Performed by: COLON & RECTAL SURGERY

## 2019-02-01 PROCEDURE — 88307 TISSUE EXAM BY PATHOLOGIST: CPT | Performed by: PATHOLOGY

## 2019-02-01 PROCEDURE — 63600175 PHARM REV CODE 636 W HCPCS

## 2019-02-01 PROCEDURE — D9220A PRA ANESTHESIA: ICD-10-PCS | Mod: ANES,,, | Performed by: ANESTHESIOLOGY

## 2019-02-01 PROCEDURE — C9290 INJ, BUPIVACAINE LIPOSOME: HCPCS | Performed by: COLON & RECTAL SURGERY

## 2019-02-01 PROCEDURE — 88307 TISSUE SPECIMEN TO PATHOLOGY - SURGERY: ICD-10-PCS | Mod: 26,,, | Performed by: PATHOLOGY

## 2019-02-01 PROCEDURE — 37000009 HC ANESTHESIA EA ADD 15 MINS: Performed by: COLON & RECTAL SURGERY

## 2019-02-01 DEVICE — MEMBRANE SEPRAFILM 5 X 6: Type: IMPLANTABLE DEVICE | Site: ABDOMEN | Status: FUNCTIONAL

## 2019-02-01 RX ORDER — PROPOFOL 10 MG/ML
VIAL (ML) INTRAVENOUS
Status: DISCONTINUED | OUTPATIENT
Start: 2019-02-01 | End: 2019-02-01

## 2019-02-01 RX ORDER — GLYCOPYRROLATE 0.2 MG/ML
INJECTION INTRAMUSCULAR; INTRAVENOUS
Status: DISCONTINUED | OUTPATIENT
Start: 2019-02-01 | End: 2019-02-01

## 2019-02-01 RX ORDER — ACETAMINOPHEN 10 MG/ML
1000 INJECTION, SOLUTION INTRAVENOUS ONCE
Status: COMPLETED | OUTPATIENT
Start: 2019-02-01 | End: 2019-02-01

## 2019-02-01 RX ORDER — SODIUM CHLORIDE 0.9 % (FLUSH) 0.9 %
3 SYRINGE (ML) INJECTION
Status: DISCONTINUED | OUTPATIENT
Start: 2019-02-01 | End: 2019-02-01 | Stop reason: HOSPADM

## 2019-02-01 RX ORDER — HYDROMORPHONE HCL IN 0.9% NACL 6 MG/30 ML
PATIENT CONTROLLED ANALGESIA SYRINGE INTRAVENOUS CONTINUOUS
Status: DISCONTINUED | OUTPATIENT
Start: 2019-02-01 | End: 2019-02-02

## 2019-02-01 RX ORDER — FENTANYL CITRATE 50 UG/ML
INJECTION, SOLUTION INTRAMUSCULAR; INTRAVENOUS
Status: DISCONTINUED | OUTPATIENT
Start: 2019-02-01 | End: 2019-02-01

## 2019-02-01 RX ORDER — NALOXONE HCL 0.4 MG/ML
0.02 VIAL (ML) INJECTION
Status: DISCONTINUED | OUTPATIENT
Start: 2019-02-01 | End: 2019-02-06 | Stop reason: HOSPADM

## 2019-02-01 RX ORDER — MIDAZOLAM HYDROCHLORIDE 1 MG/ML
INJECTION, SOLUTION INTRAMUSCULAR; INTRAVENOUS
Status: DISCONTINUED | OUTPATIENT
Start: 2019-02-01 | End: 2019-02-01

## 2019-02-01 RX ORDER — ACETAMINOPHEN 10 MG/ML
INJECTION, SOLUTION INTRAVENOUS
Status: DISCONTINUED | OUTPATIENT
Start: 2019-02-01 | End: 2019-02-01

## 2019-02-01 RX ORDER — SODIUM CHLORIDE 9 MG/ML
INJECTION, SOLUTION INTRAVENOUS CONTINUOUS
Status: DISCONTINUED | OUTPATIENT
Start: 2019-02-01 | End: 2019-02-04

## 2019-02-01 RX ORDER — DEXAMETHASONE SODIUM PHOSPHATE 4 MG/ML
INJECTION, SOLUTION INTRA-ARTICULAR; INTRALESIONAL; INTRAMUSCULAR; INTRAVENOUS; SOFT TISSUE
Status: DISCONTINUED | OUTPATIENT
Start: 2019-02-01 | End: 2019-02-01

## 2019-02-01 RX ORDER — ONDANSETRON 2 MG/ML
INJECTION INTRAMUSCULAR; INTRAVENOUS
Status: DISPENSED
Start: 2019-02-01 | End: 2019-02-02

## 2019-02-01 RX ORDER — ONDANSETRON 2 MG/ML
4 INJECTION INTRAMUSCULAR; INTRAVENOUS ONCE AS NEEDED
Status: COMPLETED | OUTPATIENT
Start: 2019-02-01 | End: 2019-02-01

## 2019-02-01 RX ORDER — MUPIROCIN 20 MG/G
1 OINTMENT TOPICAL 2 TIMES DAILY
Status: DISCONTINUED | OUTPATIENT
Start: 2019-02-01 | End: 2019-02-04

## 2019-02-01 RX ORDER — ALBUTEROL SULFATE 90 UG/1
AEROSOL, METERED RESPIRATORY (INHALATION)
Status: DISCONTINUED | OUTPATIENT
Start: 2019-02-01 | End: 2019-02-01

## 2019-02-01 RX ORDER — SUCCINYLCHOLINE CHLORIDE 20 MG/ML
INJECTION INTRAMUSCULAR; INTRAVENOUS
Status: DISCONTINUED | OUTPATIENT
Start: 2019-02-01 | End: 2019-02-01

## 2019-02-01 RX ORDER — ACETAMINOPHEN 10 MG/ML
1000 INJECTION, SOLUTION INTRAVENOUS EVERY 8 HOURS
Status: DISPENSED | OUTPATIENT
Start: 2019-02-01 | End: 2019-02-02

## 2019-02-01 RX ORDER — HYDROMORPHONE HYDROCHLORIDE 1 MG/ML
0.2 INJECTION, SOLUTION INTRAMUSCULAR; INTRAVENOUS; SUBCUTANEOUS EVERY 5 MIN PRN
Status: DISCONTINUED | OUTPATIENT
Start: 2019-02-01 | End: 2019-02-01 | Stop reason: HOSPADM

## 2019-02-01 RX ORDER — BUPIVACAINE HYDROCHLORIDE 5 MG/ML
INJECTION, SOLUTION EPIDURAL; INTRACAUDAL
Status: DISCONTINUED | OUTPATIENT
Start: 2019-02-01 | End: 2019-02-01 | Stop reason: HOSPADM

## 2019-02-01 RX ORDER — ROCURONIUM BROMIDE 10 MG/ML
INJECTION, SOLUTION INTRAVENOUS
Status: DISCONTINUED | OUTPATIENT
Start: 2019-02-01 | End: 2019-02-01

## 2019-02-01 RX ORDER — NEOSTIGMINE METHYLSULFATE 1 MG/ML
INJECTION, SOLUTION INTRAVENOUS
Status: DISCONTINUED | OUTPATIENT
Start: 2019-02-01 | End: 2019-02-01

## 2019-02-01 RX ORDER — ONDANSETRON 2 MG/ML
4 INJECTION INTRAMUSCULAR; INTRAVENOUS EVERY 12 HOURS PRN
Status: DISCONTINUED | OUTPATIENT
Start: 2019-02-01 | End: 2019-02-06 | Stop reason: HOSPADM

## 2019-02-01 RX ORDER — ENOXAPARIN SODIUM 100 MG/ML
40 INJECTION SUBCUTANEOUS DAILY
Status: DISCONTINUED | OUTPATIENT
Start: 2019-02-02 | End: 2019-02-06 | Stop reason: HOSPADM

## 2019-02-01 RX ORDER — ONDANSETRON 2 MG/ML
INJECTION INTRAMUSCULAR; INTRAVENOUS
Status: DISCONTINUED | OUTPATIENT
Start: 2019-02-01 | End: 2019-02-01

## 2019-02-01 RX ORDER — HYDROMORPHONE HCL IN 0.9% NACL 6 MG/30 ML
PATIENT CONTROLLED ANALGESIA SYRINGE INTRAVENOUS
Status: COMPLETED
Start: 2019-02-01 | End: 2019-02-01

## 2019-02-01 RX ORDER — LIDOCAINE HCL/PF 100 MG/5ML
SYRINGE (ML) INTRAVENOUS
Status: DISCONTINUED | OUTPATIENT
Start: 2019-02-01 | End: 2019-02-01

## 2019-02-01 RX ORDER — KETAMINE HCL IN 0.9 % NACL 50 MG/5 ML
SYRINGE (ML) INTRAVENOUS
Status: DISCONTINUED | OUTPATIENT
Start: 2019-02-01 | End: 2019-02-01

## 2019-02-01 RX ORDER — HYDROMORPHONE HYDROCHLORIDE 1 MG/ML
0.5 INJECTION, SOLUTION INTRAMUSCULAR; INTRAVENOUS; SUBCUTANEOUS ONCE
Status: COMPLETED | OUTPATIENT
Start: 2019-02-01 | End: 2019-02-01

## 2019-02-01 RX ADMIN — Medication: at 09:02

## 2019-02-01 RX ADMIN — IBUPROFEN 800 MG: 800 INJECTION INTRAVENOUS at 04:02

## 2019-02-01 RX ADMIN — PROPOFOL 200 MG: 10 INJECTION, EMULSION INTRAVENOUS at 05:02

## 2019-02-01 RX ADMIN — LIDOCAINE HYDROCHLORIDE 75 MG: 20 INJECTION, SOLUTION INTRAVENOUS at 05:02

## 2019-02-01 RX ADMIN — METRONIDAZOLE 500 MG: 500 INJECTION, SOLUTION INTRAVENOUS at 09:02

## 2019-02-01 RX ADMIN — SODIUM CHLORIDE, SODIUM GLUCONATE, SODIUM ACETATE, POTASSIUM CHLORIDE, MAGNESIUM CHLORIDE, SODIUM PHOSPHATE, DIBASIC, AND POTASSIUM PHOSPHATE: .53; .5; .37; .037; .03; .012; .00082 INJECTION, SOLUTION INTRAVENOUS at 06:02

## 2019-02-01 RX ADMIN — FENTANYL CITRATE 50 MCG: 50 INJECTION, SOLUTION INTRAMUSCULAR; INTRAVENOUS at 08:02

## 2019-02-01 RX ADMIN — NEOSTIGMINE METHYLSULFATE 5 MG: 1 INJECTION INTRAVENOUS at 09:02

## 2019-02-01 RX ADMIN — GLYCOPYRROLATE 0.6 MG: 0.2 INJECTION, SOLUTION INTRAMUSCULAR; INTRAVENOUS at 09:02

## 2019-02-01 RX ADMIN — ACETAMINOPHEN 1000 MG: 10 INJECTION, SOLUTION INTRAVENOUS at 04:02

## 2019-02-01 RX ADMIN — HYDROMORPHONE HYDROCHLORIDE 1 MG: 1 INJECTION, SOLUTION INTRAMUSCULAR; INTRAVENOUS; SUBCUTANEOUS at 09:02

## 2019-02-01 RX ADMIN — ONDANSETRON 4 MG: 2 INJECTION INTRAMUSCULAR; INTRAVENOUS at 09:02

## 2019-02-01 RX ADMIN — HYDROMORPHONE HYDROCHLORIDE 1 MG: 1 INJECTION, SOLUTION INTRAMUSCULAR; INTRAVENOUS; SUBCUTANEOUS at 01:02

## 2019-02-01 RX ADMIN — ROCURONIUM BROMIDE 45 MG: 10 INJECTION, SOLUTION INTRAVENOUS at 05:02

## 2019-02-01 RX ADMIN — HYDROMORPHONE HYDROCHLORIDE 0.5 MG: 1 INJECTION, SOLUTION INTRAMUSCULAR; INTRAVENOUS; SUBCUTANEOUS at 11:02

## 2019-02-01 RX ADMIN — FENTANYL CITRATE 50 MCG: 50 INJECTION, SOLUTION INTRAMUSCULAR; INTRAVENOUS at 09:02

## 2019-02-01 RX ADMIN — ROCURONIUM BROMIDE 10 MG: 10 INJECTION, SOLUTION INTRAVENOUS at 07:02

## 2019-02-01 RX ADMIN — ACETAMINOPHEN 1000 MG: 10 INJECTION, SOLUTION INTRAVENOUS at 06:02

## 2019-02-01 RX ADMIN — IBUPROFEN 800 MG: 800 INJECTION INTRAVENOUS at 10:02

## 2019-02-01 RX ADMIN — ONDANSETRON 4 MG: 2 INJECTION INTRAMUSCULAR; INTRAVENOUS at 07:02

## 2019-02-01 RX ADMIN — FENTANYL CITRATE 50 MCG: 50 INJECTION, SOLUTION INTRAMUSCULAR; INTRAVENOUS at 06:02

## 2019-02-01 RX ADMIN — AZTREONAM 2000 MG: 2 INJECTION, POWDER, LYOPHILIZED, FOR SOLUTION INTRAMUSCULAR; INTRAVENOUS at 09:02

## 2019-02-01 RX ADMIN — SODIUM CHLORIDE: 0.9 INJECTION, SOLUTION INTRAVENOUS at 03:02

## 2019-02-01 RX ADMIN — AZTREONAM 2000 MG: 2 INJECTION, POWDER, LYOPHILIZED, FOR SOLUTION INTRAMUSCULAR; INTRAVENOUS at 02:02

## 2019-02-01 RX ADMIN — Medication 10 MG: at 06:02

## 2019-02-01 RX ADMIN — SODIUM CHLORIDE, SODIUM GLUCONATE, SODIUM ACETATE, POTASSIUM CHLORIDE, MAGNESIUM CHLORIDE, SODIUM PHOSPHATE, DIBASIC, AND POTASSIUM PHOSPHATE: .53; .5; .37; .037; .03; .012; .00082 INJECTION, SOLUTION INTRAVENOUS at 07:02

## 2019-02-01 RX ADMIN — HEPARIN SODIUM 5000 UNITS: 5000 INJECTION, SOLUTION INTRAVENOUS; SUBCUTANEOUS at 04:02

## 2019-02-01 RX ADMIN — PROMETHAZINE HYDROCHLORIDE 6.25 MG: 25 INJECTION INTRAMUSCULAR; INTRAVENOUS at 01:02

## 2019-02-01 RX ADMIN — MIDAZOLAM HYDROCHLORIDE 2 MG: 1 INJECTION, SOLUTION INTRAMUSCULAR; INTRAVENOUS at 05:02

## 2019-02-01 RX ADMIN — MUPIROCIN 1 G: 20 OINTMENT TOPICAL at 10:02

## 2019-02-01 RX ADMIN — ROCURONIUM BROMIDE 5 MG: 10 INJECTION, SOLUTION INTRAVENOUS at 05:02

## 2019-02-01 RX ADMIN — SODIUM CHLORIDE: 0.9 INJECTION, SOLUTION INTRAVENOUS at 09:02

## 2019-02-01 RX ADMIN — HYDROMORPHONE HYDROCHLORIDE 1 MG: 1 INJECTION, SOLUTION INTRAMUSCULAR; INTRAVENOUS; SUBCUTANEOUS at 05:02

## 2019-02-01 RX ADMIN — VANCOMYCIN HYDROCHLORIDE 2000 MG: 100 INJECTION, POWDER, LYOPHILIZED, FOR SOLUTION INTRAVENOUS at 10:02

## 2019-02-01 RX ADMIN — MUPIROCIN: 20 OINTMENT TOPICAL at 04:02

## 2019-02-01 RX ADMIN — METRONIDAZOLE 500 MG: 500 INJECTION, SOLUTION INTRAVENOUS at 05:02

## 2019-02-01 RX ADMIN — Medication 3 ML: at 06:02

## 2019-02-01 RX ADMIN — FENTANYL CITRATE 50 MCG: 50 INJECTION, SOLUTION INTRAMUSCULAR; INTRAVENOUS at 05:02

## 2019-02-01 RX ADMIN — SUCCINYLCHOLINE CHLORIDE 160 MG: 20 INJECTION, SOLUTION INTRAMUSCULAR; INTRAVENOUS at 05:02

## 2019-02-01 RX ADMIN — Medication 30 MG: at 05:02

## 2019-02-01 RX ADMIN — ACETAMINOPHEN 1000 MG: 325 TABLET, FILM COATED ORAL at 12:02

## 2019-02-01 RX ADMIN — ROCURONIUM BROMIDE 20 MG: 10 INJECTION, SOLUTION INTRAVENOUS at 06:02

## 2019-02-01 RX ADMIN — HYDROMORPHONE HYDROCHLORIDE 1 MG: 1 INJECTION, SOLUTION INTRAMUSCULAR; INTRAVENOUS; SUBCUTANEOUS at 02:02

## 2019-02-01 RX ADMIN — DEXAMETHASONE SODIUM PHOSPHATE 8 MG: 4 INJECTION, SOLUTION INTRAMUSCULAR; INTRAVENOUS at 07:02

## 2019-02-01 RX ADMIN — ALBUTEROL SULFATE 4 PUFF: 90 AEROSOL, METERED RESPIRATORY (INHALATION) at 05:02

## 2019-02-01 RX ADMIN — FENTANYL CITRATE 50 MCG: 50 INJECTION, SOLUTION INTRAMUSCULAR; INTRAVENOUS at 07:02

## 2019-02-01 RX ADMIN — Medication 3 ML: at 02:02

## 2019-02-01 NOTE — PROGRESS NOTES
"[facility]  Infectious Disease  Progress Note    Patient Name: Kyle Abel  MRN: [id number]  Admission Date: 1/28/2019  Length of Stay: 4 days  Attending Physician: Samir Guidry MD  Primary Care Provider: Gilma Cuba NP    Isolation Status: No active isolations  Assessment/Plan:      * Sigmoid diverticulitis    - CT shows increased inflammation in the sigmoid region, cocnern for fluid organization/abscess formation and microperforation, repeat shows evolution and worsening  - No improvement with outpatient course of PO cipro and flagyl   - ID consulted for abx recs in pt with severe PCN allergy  - Pt unsure if ever exposed to Cephalosporins  - "Failed" outpatient therapy with Cipro and Flagyl likely due to lack of source control      Plan  1. Continue Aztreonam, Flagyl and Vanc. Continue Fluconazole due to intestinal perf  2. Will avoid PCNs and any cross reactive antibiotics (Zosyn, Cephalosporins etc) and will treat outpatient management as failed    3. Can consider Minocycline if pt does not improve  4. Sigmoid colectomy today for source control   5. Will follow blood cultures             Thank you for your consult. I will follow-up with patient. Please contact us if you have any additional questions.    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 Pt initially presented to [facility] ED 1/20/19 with abdominal pain. CT showed sigmoid diverticulitis at the time. Pt was discharged home with a course of PO Cipro and Flagyl with which he endorse compliance. Pt states that he initially felt better with the antibiotics but acutely worsened on 1/26. [place] [facility] [place] with increasing abdominal pain and low grade fevers (Home Tmax 100.4).     Repeat CT shows " ""increased inflammation in the region opf known sigmoid diversticulits, with an increase in relatively disorganized fluid.  There is 1 small focus of possible early fluid organization/abscess measuring approximately 2.3 x 1.1 cm.  No definite air within the fluid collection.  Scattered foci of air are noted about the region of diverticulitis suggesting sequela of micro perforation.  In comparison to the previous exam, this has increased.     Pt was admitted to Colorectal surgery and ID was consulted for antibiotic recommendations in pt with PCN allergy.     In ED, pt was started on Aztreonam 1g q8, Flagyl 500mg q8, Vancomycin 1.5g q12.   Interval History: Sigmoid colectomy today.    Review of Systems   Constitutional: Positive for appetite change and fever. Negative for activity change, chills and fatigue.   Respiratory: Negative for cough, chest tightness, shortness of breath and wheezing.    Cardiovascular: Negative for chest pain and leg swelling.   Gastrointestinal: Positive for abdominal pain. Negative for abdominal distention, anal bleeding, blood in stool, constipation, diarrhea, nausea, rectal pain and vomiting.   Genitourinary: Negative for difficulty urinating, enuresis, flank pain, frequency, genital sores and hematuria.   Musculoskeletal: Negative for back pain, gait problem and joint swelling.   Skin: Negative.  Negative for color change.   Neurological: Negative for dizziness, syncope and numbness.   Psychiatric/Behavioral: Negative for agitation, confusion, decreased concentration, dysphoric mood and hallucinations. The patient is not nervous/anxious and is not hyperactive.      Objective:     Vital Signs (Most Recent):  Temp: 99 °F (37.2 °C) (02/01/19 1129)  Pulse: 87 (02/01/19 1129)  Resp: 18 (02/01/19 1129)  BP: 115/72 (02/01/19 1129)  SpO2: 96 % (02/01/19 1129) Vital Signs (24h Range):  Temp:  [98.1 °F (36.7 °C)-99.2 °F (37.3 °C)] 99 °F (37.2 °C)  Pulse:  [] 87  Resp:  [15-18] 18  SpO2:  [96 " %-99 %] 96 %  BP: (111-131)/(69-85) 115/72     Weight: 126.1 kg (278 lb)  Body mass index is 39.89 kg/m².    Estimated Creatinine Clearance: 142.5 mL/min (based on SCr of 0.9 mg/dL).    Physical Exam   Constitutional: He is oriented to person, place, and time. He appears well-developed and well-nourished. No distress.   HENT:   Head: Normocephalic and atraumatic.   Eyes: EOM are normal. Pupils are equal, round, and reactive to light.   Cardiovascular: Normal rate, regular rhythm and normal heart sounds.   Pulmonary/Chest: Effort normal and breath sounds normal. No respiratory distress. He has no wheezes. He has no rales.   Abdominal: Soft. He exhibits no distension and no mass. There is tenderness. There is no guarding.   Pain with palpation to entire abdomen, worse in suprapubic region, LLQ and LUQ   Musculoskeletal: Normal range of motion.   Neurological: He is alert and oriented to person, place, and time.   Skin: Skin is warm and dry.   Psychiatric: He has a normal mood and affect. His behavior is normal. Judgment and thought content normal.   Nursing note and vitals reviewed.      Significant Labs:   Blood Culture:   Recent Labs   Lab 01/28/19  1356 01/28/19  1409   LABBLOO No Growth to date  No Growth to date  No Growth to date  No Growth to date No Growth to date  No Growth to date  No Growth to date  No Growth to date     BMP:   Recent Labs   Lab 02/01/19  0436   GLU 81      K 4.0      CO2 24   BUN 11   CREATININE 0.9   CALCIUM 8.7   MG 2.1     CBC:   Recent Labs   Lab 01/31/19  0738 02/01/19  0436   WBC 14.79* 10.80   HGB 13.1* 10.9*   HCT 41.7 35.3*    248     All pertinent labs within the past 24 hours have been reviewed.    Significant Imaging: I have reviewed all pertinent imaging results/findings within the past 24 hours.     CT Abdo/Pelvis 1/28/19  1. In this patient with known sigmoid diverticulitis.  There is increased inflammation in the region, with an increase in  relatively disorganized fluid.  There is 1 small focus of possible early fluid organization/abscess measuring approximately 2.3 x 1.1 cm.  No definite air within the fluid collection.  Scattered foci of air are noted about the region of diverticulitis suggesting sequela of micro perforation.  In comparison to the previous exam, this has increased.  Continued follow-up advised.  2. Hepatic steatosis, correlation with LFTs advised.  3. Additional findings above.

## 2019-02-01 NOTE — PLAN OF CARE
Problem: Adult Inpatient Plan of Care  Goal: Plan of Care Review  Outcome: Ongoing (interventions implemented as appropriate)  Plan of care reviewed with patient. Patient verbalized understanding. Patient is oriented x4. Patient skin is intact. Patient IVF remained continuous @100ml/hr. Paient ambulated to bathroom to void and have bowel movements. Patient received prn pain medication per MAR. Patient received scheduled antibiotics per MAR. Patient remained free of any falls or acute events. VSS, Will continue to monitor.

## 2019-02-01 NOTE — INTERVAL H&P NOTE
The patient has been examined and the H&P has been reviewed:    I concur with the findings and no changes have occurred since H&P was written.    Anesthesia/Surgery risks, benefits and alternative options discussed and understood by patient/family.          Active Hospital Problems    Diagnosis  POA    *Sigmoid diverticulitis [K57.32]  Yes      Resolved Hospital Problems   No resolved problems to display.

## 2019-02-01 NOTE — ASSESSMENT & PLAN NOTE
"- CT shows increased inflammation in the sigmoid region, cocnern for fluid organization/abscess formation and microperforation, repeat shows evolution and worsening  - No improvement with outpatient course of PO cipro and flagyl   - ID consulted for abx recs in pt with severe PCN allergy  - Pt unsure if ever exposed to Cephalosporins  - "Failed" outpatient therapy with Cipro and Flagyl likely due to lack of source control      Plan  1. Continue Aztreonam, Flagyl and Vanc. Continue Fluconazole due to intestinal perf  2. Will avoid PCNs and any cross reactive antibiotics (Zosyn, Cephalosporins etc) and will treat outpatient management as failed    3. Can consider Minocycline if pt does not improve  4. Sigmoid colectomy today for source control   5. Will follow blood cultures    "

## 2019-02-02 LAB
ANION GAP SERPL CALC-SCNC: 8 MMOL/L
BACTERIA BLD CULT: NORMAL
BACTERIA BLD CULT: NORMAL
BASOPHILS # BLD AUTO: 0.02 K/UL
BASOPHILS NFR BLD: 0.1 %
BUN SERPL-MCNC: 8 MG/DL
CALCIUM SERPL-MCNC: 8.2 MG/DL
CHLORIDE SERPL-SCNC: 107 MMOL/L
CO2 SERPL-SCNC: 20 MMOL/L
CREAT SERPL-MCNC: 0.7 MG/DL
DIFFERENTIAL METHOD: ABNORMAL
EOSINOPHIL # BLD AUTO: 0 K/UL
EOSINOPHIL NFR BLD: 0 %
ERYTHROCYTE [DISTWIDTH] IN BLOOD BY AUTOMATED COUNT: 14.3 %
EST. GFR  (AFRICAN AMERICAN): >60 ML/MIN/1.73 M^2
EST. GFR  (NON AFRICAN AMERICAN): >60 ML/MIN/1.73 M^2
GLUCOSE SERPL-MCNC: 164 MG/DL
HCT VFR BLD AUTO: 38 %
HGB BLD-MCNC: 12 G/DL
IMM GRANULOCYTES # BLD AUTO: 0.16 K/UL
IMM GRANULOCYTES NFR BLD AUTO: 1.2 %
LYMPHOCYTES # BLD AUTO: 0.5 K/UL
LYMPHOCYTES NFR BLD: 3.9 %
MAGNESIUM SERPL-MCNC: 2 MG/DL
MCH RBC QN AUTO: 27.6 PG
MCHC RBC AUTO-ENTMCNC: 31.6 G/DL
MCV RBC AUTO: 87 FL
MONOCYTES # BLD AUTO: 0.8 K/UL
MONOCYTES NFR BLD: 6 %
NEUTROPHILS # BLD AUTO: 12.2 K/UL
NEUTROPHILS NFR BLD: 88.8 %
NRBC BLD-RTO: 0 /100 WBC
PHOSPHATE SERPL-MCNC: 3 MG/DL
PLATELET # BLD AUTO: 279 K/UL
PMV BLD AUTO: 10.7 FL
POTASSIUM SERPL-SCNC: 3.9 MMOL/L
RBC # BLD AUTO: 4.35 M/UL
SODIUM SERPL-SCNC: 135 MMOL/L
WBC # BLD AUTO: 13.73 K/UL

## 2019-02-02 PROCEDURE — 25000003 PHARM REV CODE 250: Performed by: STUDENT IN AN ORGANIZED HEALTH CARE EDUCATION/TRAINING PROGRAM

## 2019-02-02 PROCEDURE — 20600001 HC STEP DOWN PRIVATE ROOM

## 2019-02-02 PROCEDURE — 85025 COMPLETE CBC W/AUTO DIFF WBC: CPT

## 2019-02-02 PROCEDURE — 63600175 PHARM REV CODE 636 W HCPCS: Performed by: STUDENT IN AN ORGANIZED HEALTH CARE EDUCATION/TRAINING PROGRAM

## 2019-02-02 PROCEDURE — 80048 BASIC METABOLIC PNL TOTAL CA: CPT

## 2019-02-02 PROCEDURE — 99232 SBSQ HOSP IP/OBS MODERATE 35: CPT | Mod: ,,, | Performed by: INTERNAL MEDICINE

## 2019-02-02 PROCEDURE — S0030 INJECTION, METRONIDAZOLE: HCPCS | Performed by: NURSE PRACTITIONER

## 2019-02-02 PROCEDURE — 84100 ASSAY OF PHOSPHORUS: CPT

## 2019-02-02 PROCEDURE — 97165 OT EVAL LOW COMPLEX 30 MIN: CPT

## 2019-02-02 PROCEDURE — 25000003 PHARM REV CODE 250: Performed by: NURSE PRACTITIONER

## 2019-02-02 PROCEDURE — 99232 PR SUBSEQUENT HOSPITAL CARE,LEVL II: ICD-10-PCS | Mod: ,,, | Performed by: INTERNAL MEDICINE

## 2019-02-02 PROCEDURE — 36415 COLL VENOUS BLD VENIPUNCTURE: CPT

## 2019-02-02 PROCEDURE — S0073 INJECTION, AZTREONAM, 500 MG: HCPCS | Performed by: INTERNAL MEDICINE

## 2019-02-02 PROCEDURE — 83735 ASSAY OF MAGNESIUM: CPT

## 2019-02-02 PROCEDURE — 63600175 PHARM REV CODE 636 W HCPCS: Performed by: INTERNAL MEDICINE

## 2019-02-02 PROCEDURE — 25000003 PHARM REV CODE 250: Performed by: INTERNAL MEDICINE

## 2019-02-02 RX ORDER — HYDROMORPHONE HYDROCHLORIDE 1 MG/ML
1 INJECTION, SOLUTION INTRAMUSCULAR; INTRAVENOUS; SUBCUTANEOUS EVERY 6 HOURS PRN
Status: DISCONTINUED | OUTPATIENT
Start: 2019-02-02 | End: 2019-02-02

## 2019-02-02 RX ORDER — OXYCODONE HYDROCHLORIDE 10 MG/1
10 TABLET ORAL EVERY 4 HOURS PRN
Status: DISCONTINUED | OUTPATIENT
Start: 2019-02-02 | End: 2019-02-06 | Stop reason: HOSPADM

## 2019-02-02 RX ORDER — OXYCODONE HYDROCHLORIDE 5 MG/1
5 TABLET ORAL EVERY 4 HOURS PRN
Status: DISCONTINUED | OUTPATIENT
Start: 2019-02-02 | End: 2019-02-06 | Stop reason: HOSPADM

## 2019-02-02 RX ORDER — HYDROMORPHONE HYDROCHLORIDE 1 MG/ML
1 INJECTION, SOLUTION INTRAMUSCULAR; INTRAVENOUS; SUBCUTANEOUS EVERY 4 HOURS PRN
Status: DISCONTINUED | OUTPATIENT
Start: 2019-02-02 | End: 2019-02-06 | Stop reason: HOSPADM

## 2019-02-02 RX ORDER — ENOXAPARIN SODIUM 100 MG/ML
40 INJECTION SUBCUTANEOUS EVERY 24 HOURS
Status: DISCONTINUED | OUTPATIENT
Start: 2019-02-02 | End: 2019-02-02

## 2019-02-02 RX ORDER — METHOCARBAMOL 500 MG/1
500 TABLET, FILM COATED ORAL 4 TIMES DAILY
Status: DISCONTINUED | OUTPATIENT
Start: 2019-02-02 | End: 2019-02-06 | Stop reason: HOSPADM

## 2019-02-02 RX ADMIN — VANCOMYCIN HYDROCHLORIDE 2250 MG: 1 INJECTION, POWDER, LYOPHILIZED, FOR SOLUTION INTRAVENOUS at 02:02

## 2019-02-02 RX ADMIN — IBUPROFEN 800 MG: 800 INJECTION INTRAVENOUS at 07:02

## 2019-02-02 RX ADMIN — HYDROMORPHONE HYDROCHLORIDE 1 MG: 1 INJECTION, SOLUTION INTRAMUSCULAR; INTRAVENOUS; SUBCUTANEOUS at 11:02

## 2019-02-02 RX ADMIN — HYDROMORPHONE HYDROCHLORIDE 1 MG: 1 INJECTION, SOLUTION INTRAMUSCULAR; INTRAVENOUS; SUBCUTANEOUS at 10:02

## 2019-02-02 RX ADMIN — ACETAMINOPHEN 1000 MG: 10 INJECTION, SOLUTION INTRAVENOUS at 01:02

## 2019-02-02 RX ADMIN — OXYCODONE HYDROCHLORIDE 10 MG: 10 TABLET ORAL at 05:02

## 2019-02-02 RX ADMIN — ACETAMINOPHEN 1000 MG: 10 INJECTION, SOLUTION INTRAVENOUS at 06:02

## 2019-02-02 RX ADMIN — IBUPROFEN 800 MG: 800 INJECTION INTRAVENOUS at 09:02

## 2019-02-02 RX ADMIN — PROMETHAZINE HYDROCHLORIDE 6.25 MG: 25 INJECTION INTRAMUSCULAR; INTRAVENOUS at 09:02

## 2019-02-02 RX ADMIN — MUPIROCIN 1 G: 20 OINTMENT TOPICAL at 09:02

## 2019-02-02 RX ADMIN — METRONIDAZOLE 500 MG: 500 INJECTION, SOLUTION INTRAVENOUS at 11:02

## 2019-02-02 RX ADMIN — HYDROMORPHONE HYDROCHLORIDE 1 MG: 1 INJECTION, SOLUTION INTRAMUSCULAR; INTRAVENOUS; SUBCUTANEOUS at 02:02

## 2019-02-02 RX ADMIN — ENOXAPARIN SODIUM 40 MG: 100 INJECTION SUBCUTANEOUS at 09:02

## 2019-02-02 RX ADMIN — VANCOMYCIN HYDROCHLORIDE 2250 MG: 1 INJECTION, POWDER, LYOPHILIZED, FOR SOLUTION INTRAVENOUS at 03:02

## 2019-02-02 RX ADMIN — METHOCARBAMOL TABLETS 500 MG: 500 TABLET, COATED ORAL at 05:02

## 2019-02-02 RX ADMIN — METRONIDAZOLE 500 MG: 500 INJECTION, SOLUTION INTRAVENOUS at 04:02

## 2019-02-02 RX ADMIN — Medication: at 03:02

## 2019-02-02 RX ADMIN — METHOCARBAMOL TABLETS 500 MG: 500 TABLET, COATED ORAL at 01:02

## 2019-02-02 RX ADMIN — HYDROMORPHONE HYDROCHLORIDE 1 MG: 1 INJECTION, SOLUTION INTRAMUSCULAR; INTRAVENOUS; SUBCUTANEOUS at 06:02

## 2019-02-02 RX ADMIN — METHOCARBAMOL TABLETS 500 MG: 500 TABLET, COATED ORAL at 09:02

## 2019-02-02 RX ADMIN — AZTREONAM 2000 MG: 2 INJECTION, POWDER, LYOPHILIZED, FOR SOLUTION INTRAMUSCULAR; INTRAVENOUS at 05:02

## 2019-02-02 RX ADMIN — METRONIDAZOLE 500 MG: 500 INJECTION, SOLUTION INTRAVENOUS at 07:02

## 2019-02-02 RX ADMIN — OXYCODONE HYDROCHLORIDE 10 MG: 10 TABLET ORAL at 09:02

## 2019-02-02 RX ADMIN — IBUPROFEN 800 MG: 800 INJECTION INTRAVENOUS at 01:02

## 2019-02-02 RX ADMIN — FLUCONAZOLE, SODIUM CHLORIDE 400 MG: 2 INJECTION INTRAVENOUS at 02:02

## 2019-02-02 RX ADMIN — OXYCODONE HYDROCHLORIDE 10 MG: 10 TABLET ORAL at 01:02

## 2019-02-02 RX ADMIN — AZTREONAM 2000 MG: 2 INJECTION, POWDER, LYOPHILIZED, FOR SOLUTION INTRAMUSCULAR; INTRAVENOUS at 03:02

## 2019-02-02 RX ADMIN — METRONIDAZOLE 500 MG: 500 INJECTION, SOLUTION INTRAVENOUS at 01:02

## 2019-02-02 RX ADMIN — AZTREONAM 2000 MG: 2 INJECTION, POWDER, LYOPHILIZED, FOR SOLUTION INTRAMUSCULAR; INTRAVENOUS at 09:02

## 2019-02-02 NOTE — PLAN OF CARE
Problem: Occupational Therapy Goal  Goal: Occupational Therapy Goal  Patient will complete functional t/f's with modified independence  Patient will complete toilet t/f with modified independence  Patient will complete G/H tasks standing at sink with modified independence  Patient will demonstrate modified independence for toileting  Outcome: Ongoing (interventions implemented as appropriate)  Alisia Buchanan, OT  2/2/19

## 2019-02-02 NOTE — BRIEF OP NOTE
[facility]  Surgery Department  Operative Note    SUMMARY     Date of Procedure: 2/1/2019     Procedure: Procedure(s) (LRB):  COLECTOMY, SIGMOID, Colostomy (N/A)     Surgeon(s) and Role:     * Samir Guidry MD - Primary     * Kyle Mcdaniel MD - Resident - Assisting        Pre-Operative Diagnosis: Sigmoid diverticulitis [K57.32]    Post-Operative Diagnosis: Post-Op Diagnosis Codes:     * Sigmoid diverticulitis [K57.32]    Anesthesia: General    Technical Procedures Used: Johny's procedure, drainage of pelvic abscess    Description of the Findings of the Procedure: perforated diverticulitis, purulent peritonitis    Complications: No    Estimated Blood Loss (EBL): 500 mL           Implants:   Implant Name Type Inv. Item Serial No.  Lot No. LRB No. Used   MEMBRANE SEPRAFILM 5 X 6 - [id number]  MEMBRANE SEPRAFILM 5 X 6  [employer] [id number] N/A 3       Specimens:   Specimen (12h ago, onward)    None                  Condition: Good    Disposition: PACU - hemodynamically stable.    Attestation: I was present and scrubbed for the entire procedure.

## 2019-02-02 NOTE — NURSING TRANSFER
Nursing Transfer Note      2/1/2019     Transfer To: 1007    Transfer via stretcher    Transfer with iv pole    Transported by transport    Medicines sent: none    Chart send with patient: Yes    Notified: pt refused    Patient reassessed at: 2/1/2019 2249

## 2019-02-02 NOTE — SUBJECTIVE & OBJECTIVE
Subjective:     Interval History: No acute events overnight. POD 1 Johny's procedure. Doing well now mainly complaining of back pain. No nausea or vomiting. Ostomy viable with some sweat. Started himself on clears.     Post-Op Info:  Procedure(s) (LRB):  COLECTOMY, SIGMOID, Colostomy (N/A)   1 Day Post-Op      Medications:  Continuous Infusions:   sodium chloride 0.9% 100 mL/hr at 02/01/19 2152     Scheduled Meds:   acetaminophen  1,000 mg Intravenous Q8H    aztreonam  2,000 mg Intravenous Q8H    enoxaparin  40 mg Subcutaneous Daily    enoxaparin  40 mg Subcutaneous Daily    fluconazole (DIFLUCAN) IVPB  400 mg Intravenous Q24H    ibuprofen  800 mg Intravenous Q8H    methocarbamol  500 mg Oral QID    metronidazole  500 mg Intravenous Q8H    mupirocin  1 g Nasal BID    vancomycin (VANCOCIN) IVPB  2,250 mg Intravenous Q12H     PRN Meds:   HYDROmorphone    naloxone    ondansetron    oxyCODONE    oxyCODONE    promethazine (PHENERGAN) IVPB        Objective:     Vital Signs (Most Recent):  Temp: 98.4 °F (36.9 °C) (02/02/19 0723)  Pulse: 75 (02/02/19 0723)  Resp: 12 (02/02/19 0723)  BP: 135/88 (02/02/19 0723)  SpO2: 96 % (02/02/19 0723) Vital Signs (24h Range):  Temp:  [97.5 °F (36.4 °C)-99 °F (37.2 °C)] 98.4 °F (36.9 °C)  Pulse:  [75-99] 75  Resp:  [12-22] 12  SpO2:  [93 %-100 %] 96 %  BP: (114-142)/(60-88) 135/88     Intake/Output - Last 3 Shifts       01/31 0700 - 02/01 0659 02/01 0700 - 02/02 0659 02/02 0700 - 02/03 0659    P.O. 4420 0     I.V. (mL/kg) 2201 (17.5) 4150 (32.9)     IV Piggyback 100 650     Total Intake(mL/kg) 6721 (53.3) 4800 (38.1)     Urine (mL/kg/hr)  2150 (0.7)     Drains  270     Stool  0     Blood  500     Total Output  2920     Net +6721 +1880            Urine Occurrence 6 x 4 x     Stool Occurrence 12 x 1 x     Emesis Occurrence  0 x           Physical Exam   Constitutional: He appears well-developed and well-nourished. No distress.   HENT:   Head: Normocephalic and  atraumatic.   Eyes: EOM are normal.   Neck: Normal range of motion.   Cardiovascular: Normal rate.   Pulmonary/Chest: Effort normal.   Abdominal: Soft. He exhibits no distension. There is tenderness (appropriate incisional tenderness).   Midline dressing clean, dry, and intact. Ostomy viable with bowel sweat.    Genitourinary:   Genitourinary Comments: Mcbride in place.    Musculoskeletal: Normal range of motion.   Neurological: He is alert.   Skin: Skin is warm and dry.   Psychiatric: He has a normal mood and affect. His behavior is normal.   Nursing note and vitals reviewed.    Significant Labs:  CBC:   Recent Labs   Lab 02/02/19  0510   WBC 13.73*   RBC 4.35*   HGB 12.0*   HCT 38.0*      MCV 87   MCH 27.6   MCHC 31.6*     CMP:   Recent Labs   Lab 01/28/19  1151  02/02/19  0510      < > 164*   CALCIUM 9.7   < > 8.2*   ALBUMIN 3.8  --   --    PROT 7.8  --   --       < > 135*   K 4.2   < > 3.9   CO2 24   < > 20*      < > 107   BUN 14   < > 8   CREATININE 1.0   < > 0.7   ALKPHOS 61  --   --    ALT 55*  --   --    AST 24  --   --    BILITOT 0.5  --   --     < > = values in this interval not displayed.       Significant Diagnostics:  I have reviewed all pertinent imaging results/findings within the past 24 hours.

## 2019-02-02 NOTE — PT/OT/SLP EVAL
Occupational Therapy   Evaluation    Name: Kyle Abel  MRN: [id number]  Admitting Diagnosis:  Sigmoid diverticulitis 1 Day Post-Op    Recommendations:     Discharge Recommendations: other (see comments)(Home with family)  Discharge Equipment Recommendations:  none  Barriers to discharge:  None    Assessment:     Kyle Abel is a 43 y.o. male with a medical diagnosis of Sigmoid diverticulitis.  He presents with the following performance deficits affecting function: impaired endurance, impaired self care skills, impaired functional mobilty.      Rehab Prognosis: Good; patient would benefit from acute skilled OT services to address these deficits and reach maximum level of function.       Plan:     Patient to be seen 3 x/week to address the above listed problems via self-care/home management, therapeutic activities, therapeutic exercises  · Plan of Care Expires: 02/16/19  · Plan of Care Reviewed with: patient, significant other    Subjective     Chief Complaint: Decreased activity tolerance  Patient/Family Comments/goals: Return home and to PLOF    Occupational Profile:  Living Environment: Patient lives with family (7 in one house), SSH, 0STE, t/s combo, L handed, still working/driving.  Previous level of function: Independent    Equipment Used at Home:  CPAP  Assistance upon Discharge: Family    Pain/Comfort:  · Pain Rating 1: 0/10    Patients cultural, spiritual, Yazidi conflicts given the current situation: no    Objective:     Communicated with: Nursing prior to session.  Patient found up on bedside couch upon OT entry to room.    General Precautions: Standard, fall     Occupational Performance:    Bed Mobility:    · N/A - Patient sitting up on bedside couch upon arrival    Functional Mobility/Transfers:  · Patient completed Sit <> Stand Transfer with stand by assistance  with  no assistive device   · Functional Mobility: CGA in hallway for household distance x 2, cues for  self-pacing    Activities of Daily Living:  · Grooming: stand by assistance standing  · Upper Body Dressing: stand by assistance seated  · Lower Body Dressing: moderate assistance seated on bedside couch    Cognitive/Visual Perceptual:  Cognitive/Psychosocial Skills:     -       Oriented to: Person, Place, Time and Situation   -       Follows Commands/attention:Follows multistep  commands  -       Communication: clear/fluent  -       Memory: No Deficits noted  -       Safety awareness/insight to disability: intact   -       Mood/Affect/Coping skills/emotional control: Appropriate to situation and Pleasant    Physical Exam:  Upper Extremity Range of Motion:     -       Right Upper Extremity: WFL  -       Left Upper Extremity: WFL  Upper Extremity Strength:    -       Right Upper Extremity: WFL  -       Left Upper Extremity: WFL   Strength:    -       Right Upper Extremity: WFL  -       Left Upper Extremity: WFL    AMPAC 6 Click ADL:  AMPAC Total Score: 19    Treatment & Education:  Evaluation completed, patient educated on roles/goals of OT and POC, importance of continued EOB and OOB ax, energy conservation and safety.  Education:    Patient left up in chair with all lines intact, call button in reach, nursing notified and signficant other present    GOALS:   Multidisciplinary Problems     Occupational Therapy Goals        Problem: Occupational Therapy Goal    Goal Priority Disciplines Outcome Interventions   Occupational Therapy Goal     OT, PT/OT Ongoing (interventions implemented as appropriate)    Description:  Patient will complete functional t/f's with modified independence  Patient will complete toilet t/f with modified independence  Patient will complete G/H tasks standing at sink with modified independence  Patient will demonstrate modified independence for toileting                    History:     Past Medical History:   Diagnosis Date    Colon polyps 2017    repeat colonoscopy in 5 years     "Hemorrhoid     Hypertension     Diagnosed @ 1 1/2 years ago and medication was prescribed - patient reports he did not take medication and does not want treatment for blood pressure    Internal hemorrhoid, bleeding     Rectal bleed        Past Surgical History:   Procedure Laterality Date    COLONOSCOPY N/A 2017    Performed by Chapin Hernandez MD at [facility]       Clinical Decision Makin.  OT Mod:  "Pt evaluation falls under moderate complexity for evaluation coding due to identification of 3-5 performance deficits noted as stated above. Eval required Min/Mod assistance to complete on this date and detailed assessment(s) were utilized. Moreover, an expanded review of history and occupational profile obtained with additional review of cognitive, physical and psychosocial hx."     Time Tracking:     OT Date of Treatment: 19  OT Start Time: 1118  OT Stop Time: 1140  OT Total Time (min): 22 min    Billable Minutes:Evaluation 22 mins    Alisia Buchanan, OT  2019    "

## 2019-02-02 NOTE — TRANSFER OF CARE
"Anesthesia Transfer of Care Note    Patient: Kyle Abel    Procedure(s) Performed: Procedure(s) (LRB):  COLECTOMY, SIGMOID, Colostomy (N/A)    Patient location: PACU    Anesthesia Type: general    Transport from OR: Transported from OR on 6-10 L/min O2 by face mask with adequate spontaneous ventilation    Post pain: adequate analgesia    Post assessment: tolerated procedure well and no apparent anesthetic complications    Post vital signs: stable    Level of consciousness: awake, alert and oriented    Nausea/Vomiting: no nausea/vomiting    Complications: none    Transfer of care protocol was followed      Last vitals:   Visit Vitals  /85   Pulse 99   Temp 36.4 °C (97.5 °F) (Temporal)   Resp (!) 22   Ht 5' 10" (1.778 m)   Wt 126.1 kg (278 lb)   SpO2 100%   BMI 39.89 kg/m²     "

## 2019-02-02 NOTE — PROGRESS NOTES
"[facility]  Infectious Disease  Progress Note    Patient Name: Kyle Abel  MRN: [id number]  Admission Date: 1/28/2019  Length of Stay: 5 days  Attending Physician: Samir Guidry MD  Primary Care Provider: Gilma Cuba NP    Isolation Status: No active isolations  Assessment/Plan:      * Sigmoid diverticulitis    - CT shows increased inflammation in the sigmoid region, cocnern for fluid organization/abscess formation and microperforation, repeat shows evolution and worsening  - No improvement with outpatient course of PO cipro and flagyl   - ID consulted for abx recs in pt with severe PCN allergy  - Pt unsure if ever exposed to Cephalosporins  - "Failed" outpatient therapy with Cipro and Flagyl likely due to lack of source control      Plan  1. Continue Aztreonam, Flagyl and Vanc. Continue Fluconazole due to intestinal perf  2. Will avoid PCNs and any cross reactive antibiotics (Zosyn, Cephalosporins etc) and will treat outpatient management as failed    3. POD 1 Sigmoid colectomy for source control   4. Will follow blood cultures and OR cultures, NGTD  5. Repeat abdominal imaging with drain removal to ensure resolution of abscess.          Thank you for your consult. I will follow-up with patient. Please contact us if you have any additional questions.    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 Pt initially presented to [facility] ED 1/20/19 with abdominal pain. CT showed sigmoid diverticulitis at the time. Pt was discharged home with a course of PO Cipro and Flagyl with which he endorse compliance. Pt states that he initially felt better with the antibiotics but acutely worsened on 1/26. Pt represented to [facility] ED on 1/28 with increasing abdominal pain and low grade " "fevers (Home Tmax 100.4).     Repeat CT shows "increased inflammation in the region opf known sigmoid diversticulits, with an increase in relatively disorganized fluid.  There is 1 small focus of possible early fluid organization/abscess measuring approximately 2.3 x 1.1 cm.  No definite air within the fluid collection.  Scattered foci of air are noted about the region of diverticulitis suggesting sequela of micro perforation.  In comparison to the previous exam, this has increased.     Pt was admitted to Colorectal surgery and ID was consulted for antibiotic recommendations in pt with PCN allergy.     In ED, pt was started on Aztreonam 1g q8, Flagyl 500mg q8, Vancomycin 1.5g q12.   Interval History: POD1 from Sigmoid colectomy. OR cultures pending. Will continue broad spectrum abx for now and tailor based on culture results and repeat imaging once drain removed.     Review of Systems   Constitutional: Positive for appetite change and fever. Negative for activity change, chills and fatigue.   Respiratory: Negative for cough, chest tightness, shortness of breath and wheezing.    Cardiovascular: Negative for chest pain and leg swelling.   Gastrointestinal: Positive for abdominal pain. Negative for abdominal distention, anal bleeding, blood in stool, constipation, diarrhea, nausea, rectal pain and vomiting.   Genitourinary: Negative for difficulty urinating, enuresis, flank pain, frequency, genital sores and hematuria.   Musculoskeletal: Negative for back pain, gait problem and joint swelling.   Skin: Negative.  Negative for color change.   Neurological: Negative for dizziness, syncope and numbness.   Psychiatric/Behavioral: Negative for agitation, confusion, decreased concentration, dysphoric mood and hallucinations. The patient is not nervous/anxious and is not hyperactive.      Objective:     Vital Signs (Most Recent):  Temp: 98 °F (36.7 °C) (02/02/19 1100)  Pulse: 85 (02/02/19 1100)  Resp: 20 (02/02/19 1100)  BP: " 112/73 (02/02/19 1100)  SpO2: 96 % (02/02/19 1100) Vital Signs (24h Range):  Temp:  [97.5 °F (36.4 °C)-98.8 °F (37.1 °C)] 98 °F (36.7 °C)  Pulse:  [75-99] 85  Resp:  [12-22] 20  SpO2:  [93 %-100 %] 96 %  BP: (112-142)/(60-88) 112/73     Weight: 126.1 kg (278 lb)  Body mass index is 39.89 kg/m².    Estimated Creatinine Clearance: 183.2 mL/min (based on SCr of 0.7 mg/dL).    Physical Exam   Constitutional: He is oriented to person, place, and time. He appears well-developed and well-nourished. No distress.   HENT:   Head: Normocephalic and atraumatic.   Eyes: EOM are normal. Pupils are equal, round, and reactive to light.   Cardiovascular: Normal rate, regular rhythm and normal heart sounds.   Pulmonary/Chest: Effort normal and breath sounds normal. No respiratory distress. He has no wheezes. He has no rales.   Abdominal: Soft. He exhibits no distension and no mass. There is tenderness. There is no guarding.   Midline surgical incision bandage clean and dry   Drain in place with serosanguinous fluid drainage    Musculoskeletal: Normal range of motion.   Neurological: He is alert and oriented to person, place, and time.   Skin: Skin is warm and dry.   Psychiatric: He has a normal mood and affect. His behavior is normal. Judgment and thought content normal.   Nursing note and vitals reviewed.      Significant Labs:   Blood Culture:   Recent Labs   Lab 01/28/19  1356 01/28/19  1409   LABBLOO No Growth to date  No Growth to date  No Growth to date  No Growth to date  No Growth to date No Growth to date  No Growth to date  No Growth to date  No Growth to date  No Growth to date     BMP:   Recent Labs   Lab 02/02/19  0510   *   *   K 3.9      CO2 20*   BUN 8   CREATININE 0.7   CALCIUM 8.2*   MG 2.0     CBC:   Recent Labs   Lab 02/01/19  0436 02/02/19  0510   WBC 10.80 13.73*   HGB 10.9* 12.0*   HCT 35.3* 38.0*    279     All pertinent labs within the past 24 hours have been  reviewed.    Significant Imaging: I have reviewed all pertinent imaging results/findings within the past 24 hours.     CT Abdo/Pelvis 1/28/19  1. In this patient with known sigmoid diverticulitis.  There is increased inflammation in the region, with an increase in relatively disorganized fluid.  There is 1 small focus of possible early fluid organization/abscess measuring approximately 2.3 x 1.1 cm.  No definite air within the fluid collection.  Scattered foci of air are noted about the region of diverticulitis suggesting sequela of micro perforation.  In comparison to the previous exam, this has increased.  Continued follow-up advised.  2. Hepatic steatosis, correlation with LFTs advised.  3. Additional findings above.

## 2019-02-02 NOTE — CONSULTS
Patient seen for new ostomy consult. Initial ostomy education begun.  Goals of education include:     Pouch emptying, sizing/cuting pouch, and applying pouch   Stoma and teetee-stomal care   Food choices and hydration   Obtaining supplies    The pouch is intact to the LLQ, stoma is red/moist, no flatus noted in pouch, no stool, bowel sweat noted. Discussed and demonstrated cutting ostomy pouch and emptying pouch of stool and gas.  Discussed meal planning with particular foods to avoid.  Discussed importance of hydration and increasing fluid intake.  Explained process of ordering supplies to be delivered to patients home. Provided reading materials regarding todays training and will follow up with patient tomorrow.  Offered patient to have any friend or family to attend next training session. Encouraged to ambulate in hallway at least 4 times a day for about 20 minutes.  Ostomy department to follow.

## 2019-02-02 NOTE — PROGRESS NOTES
[facility]  Colorectal Surgery  Progress Note    Patient Name: Kyle Abel  MRN: [id number]  Admission Date: 1/28/2019  Hospital Length of Stay: 5 days  Attending Physician: Samir Guidry MD    Subjective:     Interval History: No acute events overnight. POD 1 Johny's procedure. Doing well now mainly complaining of back pain. No nausea or vomiting. Ostomy viable with some sweat. Started himself on clears.     Post-Op Info:  Procedure(s) (LRB):  COLECTOMY, SIGMOID, Colostomy (N/A)   1 Day Post-Op      Medications:  Continuous Infusions:   sodium chloride 0.9% 100 mL/hr at 02/01/19 2152     Scheduled Meds:   acetaminophen  1,000 mg Intravenous Q8H    aztreonam  2,000 mg Intravenous Q8H    enoxaparin  40 mg Subcutaneous Daily    enoxaparin  40 mg Subcutaneous Daily    fluconazole (DIFLUCAN) IVPB  400 mg Intravenous Q24H    ibuprofen  800 mg Intravenous Q8H    methocarbamol  500 mg Oral QID    metronidazole  500 mg Intravenous Q8H    mupirocin  1 g Nasal BID    vancomycin (VANCOCIN) IVPB  2,250 mg Intravenous Q12H     PRN Meds:   HYDROmorphone    naloxone    ondansetron    oxyCODONE    oxyCODONE    promethazine (PHENERGAN) IVPB        Objective:     Vital Signs (Most Recent):  Temp: 98.4 °F (36.9 °C) (02/02/19 0723)  Pulse: 75 (02/02/19 0723)  Resp: 12 (02/02/19 0723)  BP: 135/88 (02/02/19 0723)  SpO2: 96 % (02/02/19 0723) Vital Signs (24h Range):  Temp:  [97.5 °F (36.4 °C)-99 °F (37.2 °C)] 98.4 °F (36.9 °C)  Pulse:  [75-99] 75  Resp:  [12-22] 12  SpO2:  [93 %-100 %] 96 %  BP: (114-142)/(60-88) 135/88     Intake/Output - Last 3 Shifts       01/31 0700 - 02/01 0659 02/01 0700 - 02/02 0659 02/02 0700 - 02/03 0659    P.O. 4420 0     I.V. (mL/kg) 2201 (17.5) 4150 (32.9)     IV Piggyback 100 650     Total Intake(mL/kg) 6721 (53.3) 4800 (38.1)     Urine (mL/kg/hr)  2150 (0.7)     Drains  270     Stool  0     Blood  500     Total Output  2920     Net +6721 +1880            Urine  Occurrence 6 x 4 x     Stool Occurrence 12 x 1 x     Emesis Occurrence  0 x           Physical Exam   Constitutional: He appears well-developed and well-nourished. No distress.   HENT:   Head: Normocephalic and atraumatic.   Eyes: EOM are normal.   Neck: Normal range of motion.   Cardiovascular: Normal rate.   Pulmonary/Chest: Effort normal.   Abdominal: Soft. He exhibits no distension. There is tenderness (appropriate incisional tenderness).   Midline dressing clean, dry, and intact. Ostomy viable with bowel sweat.    Genitourinary:   Genitourinary Comments: Díaz in place.    Musculoskeletal: Normal range of motion.   Neurological: He is alert.   Skin: Skin is warm and dry.   Psychiatric: He has a normal mood and affect. His behavior is normal.   Nursing note and vitals reviewed.    Significant Labs:  CBC:   Recent Labs   Lab 02/02/19  0510   WBC 13.73*   RBC 4.35*   HGB 12.0*   HCT 38.0*      MCV 87   MCH 27.6   MCHC 31.6*     CMP:   Recent Labs   Lab 01/28/19  1151  02/02/19  0510      < > 164*   CALCIUM 9.7   < > 8.2*   ALBUMIN 3.8  --   --    PROT 7.8  --   --       < > 135*   K 4.2   < > 3.9   CO2 24   < > 20*      < > 107   BUN 14   < > 8   CREATININE 1.0   < > 0.7   ALKPHOS 61  --   --    ALT 55*  --   --    AST 24  --   --    BILITOT 0.5  --   --     < > = values in this interval not displayed.       Significant Diagnostics:  I have reviewed all pertinent imaging results/findings within the past 24 hours.    Assessment/Plan:     * Sigmoid diverticulitis    43 year old male failed outpt treatment for diverticulitis. POD 1 Johny's procedure. Main complaint is back pain. Ostomy looks viable. No output yet.     -clears  -d/c pca and start IV and oral pain medication  -muscle relaxant for back   -continue abx  -consider removing díaz today or tomorrow  -oob today  -DVT and GI ppx  -daily labs           Luis Cardenas MD  Colorectal Surgery  [facility]

## 2019-02-02 NOTE — ASSESSMENT & PLAN NOTE
43 year old male failed outpt treatment for diverticulitis. POD 1 Johny's procedure. Main complaint is back pain. Ostomy looks viable. No output yet.     -clears  -d/c pca and start IV and oral pain medication  -muscle relaxant for back   -continue abx  -consider removing díaz today or tomorrow  -oob today  -DVT and GI ppx  -daily labs

## 2019-02-02 NOTE — ASSESSMENT & PLAN NOTE
"- CT shows increased inflammation in the sigmoid region, cocnern for fluid organization/abscess formation and microperforation, repeat shows evolution and worsening  - No improvement with outpatient course of PO cipro and flagyl   - ID consulted for abx recs in pt with severe PCN allergy  - Pt unsure if ever exposed to Cephalosporins  - "Failed" outpatient therapy with Cipro and Flagyl likely due to lack of source control      Plan  1. Continue Aztreonam, Flagyl and Vanc. Continue Fluconazole due to intestinal perf  2. Will avoid PCNs and any cross reactive antibiotics (Zosyn, Cephalosporins etc) and will treat outpatient management as failed    3. POD 1 Sigmoid colectomy for source control   4. Will follow blood cultures and OR cultures, NGTD  5. Repeat abdominal imaging with drain removal to ensure resolution of abscess.   "

## 2019-02-02 NOTE — SUBJECTIVE & OBJECTIVE
Interval History: POD1 from Sigmoid colectomy. OR cultures pending. Will continue broad spectrum abx for now and tailor based on culture results and repeat imaging once drain removed.     Review of Systems   Constitutional: Positive for appetite change and fever. Negative for activity change, chills and fatigue.   Respiratory: Negative for cough, chest tightness, shortness of breath and wheezing.    Cardiovascular: Negative for chest pain and leg swelling.   Gastrointestinal: Positive for abdominal pain. Negative for abdominal distention, anal bleeding, blood in stool, constipation, diarrhea, nausea, rectal pain and vomiting.   Genitourinary: Negative for difficulty urinating, enuresis, flank pain, frequency, genital sores and hematuria.   Musculoskeletal: Negative for back pain, gait problem and joint swelling.   Skin: Negative.  Negative for color change.   Neurological: Negative for dizziness, syncope and numbness.   Psychiatric/Behavioral: Negative for agitation, confusion, decreased concentration, dysphoric mood and hallucinations. The patient is not nervous/anxious and is not hyperactive.      Objective:     Vital Signs (Most Recent):  Temp: 98 °F (36.7 °C) (02/02/19 1100)  Pulse: 85 (02/02/19 1100)  Resp: 20 (02/02/19 1100)  BP: 112/73 (02/02/19 1100)  SpO2: 96 % (02/02/19 1100) Vital Signs (24h Range):  Temp:  [97.5 °F (36.4 °C)-98.8 °F (37.1 °C)] 98 °F (36.7 °C)  Pulse:  [75-99] 85  Resp:  [12-22] 20  SpO2:  [93 %-100 %] 96 %  BP: (112-142)/(60-88) 112/73     Weight: 126.1 kg (278 lb)  Body mass index is 39.89 kg/m².    Estimated Creatinine Clearance: 183.2 mL/min (based on SCr of 0.7 mg/dL).    Physical Exam   Constitutional: He is oriented to person, place, and time. He appears well-developed and well-nourished. No distress.   HENT:   Head: Normocephalic and atraumatic.   Eyes: EOM are normal. Pupils are equal, round, and reactive to light.   Cardiovascular: Normal rate, regular rhythm and normal heart  sounds.   Pulmonary/Chest: Effort normal and breath sounds normal. No respiratory distress. He has no wheezes. He has no rales.   Abdominal: Soft. He exhibits no distension and no mass. There is tenderness. There is no guarding.   Midline surgical incision bandage clean and dry   Drain in place with serosanguinous fluid drainage    Musculoskeletal: Normal range of motion.   Neurological: He is alert and oriented to person, place, and time.   Skin: Skin is warm and dry.   Psychiatric: He has a normal mood and affect. His behavior is normal. Judgment and thought content normal.   Nursing note and vitals reviewed.      Significant Labs:   Blood Culture:   Recent Labs   Lab 01/28/19  1356 01/28/19  1409   LABBLOO No Growth to date  No Growth to date  No Growth to date  No Growth to date  No Growth to date No Growth to date  No Growth to date  No Growth to date  No Growth to date  No Growth to date     BMP:   Recent Labs   Lab 02/02/19  0510   *   *   K 3.9      CO2 20*   BUN 8   CREATININE 0.7   CALCIUM 8.2*   MG 2.0     CBC:   Recent Labs   Lab 02/01/19  0436 02/02/19  0510   WBC 10.80 13.73*   HGB 10.9* 12.0*   HCT 35.3* 38.0*    279     All pertinent labs within the past 24 hours have been reviewed.    Significant Imaging: I have reviewed all pertinent imaging results/findings within the past 24 hours.     CT Abdo/Pelvis 1/28/19  1. In this patient with known sigmoid diverticulitis.  There is increased inflammation in the region, with an increase in relatively disorganized fluid.  There is 1 small focus of possible early fluid organization/abscess measuring approximately 2.3 x 1.1 cm.  No definite air within the fluid collection.  Scattered foci of air are noted about the region of diverticulitis suggesting sequela of micro perforation.  In comparison to the previous exam, this has increased.  Continued follow-up advised.  2. Hepatic steatosis, correlation with LFTs advised.  3.  Additional findings above.

## 2019-02-03 LAB
ANION GAP SERPL CALC-SCNC: 5 MMOL/L
BASOPHILS # BLD AUTO: 0.05 K/UL
BASOPHILS NFR BLD: 0.5 %
BUN SERPL-MCNC: 7 MG/DL
CALCIUM SERPL-MCNC: 8.7 MG/DL
CHLORIDE SERPL-SCNC: 104 MMOL/L
CO2 SERPL-SCNC: 28 MMOL/L
CREAT SERPL-MCNC: 0.7 MG/DL
DIFFERENTIAL METHOD: ABNORMAL
EOSINOPHIL # BLD AUTO: 0.2 K/UL
EOSINOPHIL NFR BLD: 2.3 %
ERYTHROCYTE [DISTWIDTH] IN BLOOD BY AUTOMATED COUNT: 14.5 %
EST. GFR  (AFRICAN AMERICAN): >60 ML/MIN/1.73 M^2
EST. GFR  (NON AFRICAN AMERICAN): >60 ML/MIN/1.73 M^2
GLUCOSE SERPL-MCNC: 104 MG/DL
HCT VFR BLD AUTO: 36.8 %
HGB BLD-MCNC: 11.3 G/DL
IMM GRANULOCYTES # BLD AUTO: 0.17 K/UL
IMM GRANULOCYTES NFR BLD AUTO: 1.9 %
LYMPHOCYTES # BLD AUTO: 1.2 K/UL
LYMPHOCYTES NFR BLD: 13.4 %
MAGNESIUM SERPL-MCNC: 1.9 MG/DL
MCH RBC QN AUTO: 28.3 PG
MCHC RBC AUTO-ENTMCNC: 30.7 G/DL
MCV RBC AUTO: 92 FL
MONOCYTES # BLD AUTO: 1.2 K/UL
MONOCYTES NFR BLD: 13 %
NEUTROPHILS # BLD AUTO: 6.3 K/UL
NEUTROPHILS NFR BLD: 68.9 %
NRBC BLD-RTO: 0 /100 WBC
PHOSPHATE SERPL-MCNC: 2.2 MG/DL
PLATELET # BLD AUTO: 274 K/UL
PMV BLD AUTO: 10.6 FL
POTASSIUM SERPL-SCNC: 4 MMOL/L
RBC # BLD AUTO: 4 M/UL
SODIUM SERPL-SCNC: 137 MMOL/L
VANCOMYCIN TROUGH SERPL-MCNC: 17.6 UG/ML
WBC # BLD AUTO: 9.16 K/UL

## 2019-02-03 PROCEDURE — 25000003 PHARM REV CODE 250: Performed by: NURSE PRACTITIONER

## 2019-02-03 PROCEDURE — 25000003 PHARM REV CODE 250: Performed by: INTERNAL MEDICINE

## 2019-02-03 PROCEDURE — 80048 BASIC METABOLIC PNL TOTAL CA: CPT

## 2019-02-03 PROCEDURE — 25000003 PHARM REV CODE 250: Performed by: STUDENT IN AN ORGANIZED HEALTH CARE EDUCATION/TRAINING PROGRAM

## 2019-02-03 PROCEDURE — 99232 SBSQ HOSP IP/OBS MODERATE 35: CPT | Mod: ,,, | Performed by: INTERNAL MEDICINE

## 2019-02-03 PROCEDURE — 80202 ASSAY OF VANCOMYCIN: CPT

## 2019-02-03 PROCEDURE — 85025 COMPLETE CBC W/AUTO DIFF WBC: CPT

## 2019-02-03 PROCEDURE — 36415 COLL VENOUS BLD VENIPUNCTURE: CPT

## 2019-02-03 PROCEDURE — 84100 ASSAY OF PHOSPHORUS: CPT

## 2019-02-03 PROCEDURE — 83735 ASSAY OF MAGNESIUM: CPT

## 2019-02-03 PROCEDURE — S0030 INJECTION, METRONIDAZOLE: HCPCS | Performed by: NURSE PRACTITIONER

## 2019-02-03 PROCEDURE — 63600175 PHARM REV CODE 636 W HCPCS: Performed by: STUDENT IN AN ORGANIZED HEALTH CARE EDUCATION/TRAINING PROGRAM

## 2019-02-03 PROCEDURE — 94799 UNLISTED PULMONARY SVC/PX: CPT

## 2019-02-03 PROCEDURE — 20600001 HC STEP DOWN PRIVATE ROOM

## 2019-02-03 PROCEDURE — S0073 INJECTION, AZTREONAM, 500 MG: HCPCS | Performed by: INTERNAL MEDICINE

## 2019-02-03 PROCEDURE — 99232 PR SUBSEQUENT HOSPITAL CARE,LEVL II: ICD-10-PCS | Mod: ,,, | Performed by: INTERNAL MEDICINE

## 2019-02-03 PROCEDURE — 63600175 PHARM REV CODE 636 W HCPCS: Performed by: INTERNAL MEDICINE

## 2019-02-03 RX ADMIN — SODIUM CHLORIDE: 0.9 INJECTION, SOLUTION INTRAVENOUS at 10:02

## 2019-02-03 RX ADMIN — HYDROMORPHONE HYDROCHLORIDE 1 MG: 1 INJECTION, SOLUTION INTRAMUSCULAR; INTRAVENOUS; SUBCUTANEOUS at 07:02

## 2019-02-03 RX ADMIN — METRONIDAZOLE 500 MG: 500 INJECTION, SOLUTION INTRAVENOUS at 03:02

## 2019-02-03 RX ADMIN — IBUPROFEN 800 MG: 800 INJECTION INTRAVENOUS at 01:02

## 2019-02-03 RX ADMIN — AZTREONAM 2000 MG: 2 INJECTION, POWDER, LYOPHILIZED, FOR SOLUTION INTRAMUSCULAR; INTRAVENOUS at 05:02

## 2019-02-03 RX ADMIN — OXYCODONE HYDROCHLORIDE 10 MG: 10 TABLET ORAL at 10:02

## 2019-02-03 RX ADMIN — METRONIDAZOLE 500 MG: 500 INJECTION, SOLUTION INTRAVENOUS at 11:02

## 2019-02-03 RX ADMIN — AZTREONAM 2000 MG: 2 INJECTION, POWDER, LYOPHILIZED, FOR SOLUTION INTRAMUSCULAR; INTRAVENOUS at 01:02

## 2019-02-03 RX ADMIN — PROMETHAZINE HYDROCHLORIDE 6.25 MG: 25 INJECTION INTRAMUSCULAR; INTRAVENOUS at 01:02

## 2019-02-03 RX ADMIN — METHOCARBAMOL TABLETS 500 MG: 500 TABLET, COATED ORAL at 04:02

## 2019-02-03 RX ADMIN — IBUPROFEN 800 MG: 800 INJECTION INTRAVENOUS at 10:02

## 2019-02-03 RX ADMIN — OXYCODONE HYDROCHLORIDE 10 MG: 10 TABLET ORAL at 04:02

## 2019-02-03 RX ADMIN — AZTREONAM 2000 MG: 2 INJECTION, POWDER, LYOPHILIZED, FOR SOLUTION INTRAMUSCULAR; INTRAVENOUS at 09:02

## 2019-02-03 RX ADMIN — VANCOMYCIN HYDROCHLORIDE 2250 MG: 1 INJECTION, POWDER, LYOPHILIZED, FOR SOLUTION INTRAVENOUS at 03:02

## 2019-02-03 RX ADMIN — METRONIDAZOLE 500 MG: 500 INJECTION, SOLUTION INTRAVENOUS at 07:02

## 2019-02-03 RX ADMIN — HYDROMORPHONE HYDROCHLORIDE 1 MG: 1 INJECTION, SOLUTION INTRAMUSCULAR; INTRAVENOUS; SUBCUTANEOUS at 03:02

## 2019-02-03 RX ADMIN — MUPIROCIN 1 G: 20 OINTMENT TOPICAL at 08:02

## 2019-02-03 RX ADMIN — OXYCODONE HYDROCHLORIDE 10 MG: 10 TABLET ORAL at 06:02

## 2019-02-03 RX ADMIN — HYDROMORPHONE HYDROCHLORIDE 1 MG: 1 INJECTION, SOLUTION INTRAMUSCULAR; INTRAVENOUS; SUBCUTANEOUS at 08:02

## 2019-02-03 RX ADMIN — ENOXAPARIN SODIUM 40 MG: 100 INJECTION SUBCUTANEOUS at 09:02

## 2019-02-03 RX ADMIN — MUPIROCIN 1 G: 20 OINTMENT TOPICAL at 09:02

## 2019-02-03 RX ADMIN — FLUCONAZOLE, SODIUM CHLORIDE 400 MG: 2 INJECTION INTRAVENOUS at 03:02

## 2019-02-03 RX ADMIN — OXYCODONE HYDROCHLORIDE 10 MG: 10 TABLET ORAL at 01:02

## 2019-02-03 RX ADMIN — ONDANSETRON 4 MG: 2 INJECTION INTRAMUSCULAR; INTRAVENOUS at 12:02

## 2019-02-03 RX ADMIN — METHOCARBAMOL TABLETS 500 MG: 500 TABLET, COATED ORAL at 08:02

## 2019-02-03 RX ADMIN — METHOCARBAMOL TABLETS 500 MG: 500 TABLET, COATED ORAL at 09:02

## 2019-02-03 RX ADMIN — IBUPROFEN 800 MG: 800 INJECTION INTRAVENOUS at 06:02

## 2019-02-03 RX ADMIN — METHOCARBAMOL TABLETS 500 MG: 500 TABLET, COATED ORAL at 12:02

## 2019-02-03 RX ADMIN — HYDROMORPHONE HYDROCHLORIDE 1 MG: 1 INJECTION, SOLUTION INTRAMUSCULAR; INTRAVENOUS; SUBCUTANEOUS at 12:02

## 2019-02-03 NOTE — PLAN OF CARE
Problem: Adult Inpatient Plan of Care  Goal: Plan of Care Review  Outcome: Ongoing (interventions implemented as appropriate)  Pt AAOX4. Pain managed with PRN pain meds. Adequate urine output overnight. Clear liquid diet, no complaints of nausea or vomiting. JOSE intact and patent. vS stable on room air. Family at bedside. Pt slept between care. Bed low and locked, call bell within reach. Will continue to monitor.

## 2019-02-03 NOTE — PROGRESS NOTES
[facility]  Colorectal Surgery  Progress Note    Patient Name: Kyle Abel  MRN: [id number]  Admission Date: 1/28/2019  Hospital Length of Stay: 6 days  Attending Physician: Samir Guidry MD    Subjective:     Interval History: No acute events overnight. Pain controlled adequately. Tolerating clears. Ambulating.     Post-Op Info:  Procedure(s) (LRB):  COLECTOMY, SIGMOID, Colostomy (N/A)   2 Days Post-Op      Medications:  Continuous Infusions:   sodium chloride 0.9% 100 mL/hr at 02/01/19 2152     Scheduled Meds:   aztreonam  2,000 mg Intravenous Q8H    enoxaparin  40 mg Subcutaneous Daily    fluconazole (DIFLUCAN) IVPB  400 mg Intravenous Q24H    ibuprofen  800 mg Intravenous Q8H    methocarbamol  500 mg Oral QID    metronidazole  500 mg Intravenous Q8H    mupirocin  1 g Nasal BID    vancomycin (VANCOCIN) IVPB  2,250 mg Intravenous Q12H     PRN Meds:   HYDROmorphone    naloxone    ondansetron    oxyCODONE    oxyCODONE    promethazine (PHENERGAN) IVPB        Objective:     Vital Signs (Most Recent):  Temp: 97.9 °F (36.6 °C) (02/03/19 0725)  Pulse: 83 (02/03/19 0725)  Resp: 20 (02/03/19 0725)  BP: 119/76 (02/03/19 0725)  SpO2: 97 % (02/03/19 0725) Vital Signs (24h Range):  Temp:  [96.8 °F (36 °C)-98.7 °F (37.1 °C)] 97.9 °F (36.6 °C)  Pulse:  [75-85] 83  Resp:  [17-20] 20  SpO2:  [95 %-97 %] 97 %  BP: (108-132)/(64-76) 119/76     Intake/Output - Last 3 Shifts       02/01 0700 - 02/02 0659 02/02 0700 - 02/03 0659 02/03 0700 - 02/04 0659    P.O. 0 1300     I.V. (mL/kg) 4150 (32.9) 1200 (9.5)     IV Piggyback 650 1200     Total Intake(mL/kg) 4800 (38.1) 3700 (29.3)     Urine (mL/kg/hr) 2150 (0.7) 800 (0.3)     Emesis/NG output  0     Drains 270 105     Other  0     Stool 0 0     Blood 500      Total Output 2920 905     Net +1880 +2795            Urine Occurrence 4 x 5 x 1 x    Stool Occurrence 1 x 0 x     Emesis Occurrence 0 x 0 x           Physical Exam   Constitutional: He  appears well-developed and well-nourished. No distress.   HENT:   Head: Normocephalic and atraumatic.   Eyes: EOM are normal.   Neck: Normal range of motion.   Cardiovascular: Normal rate.   Pulmonary/Chest: Effort normal.   Abdominal: Soft. He exhibits no distension. There is tenderness (appropriate incisional tenderness).   Midline dressing clean, dry, and intact. Ostomy viable with bowel sweat.    Musculoskeletal: Normal range of motion.   Neurological: He is alert.   Skin: Skin is warm and dry.   Psychiatric: He has a normal mood and affect. His behavior is normal.   Nursing note and vitals reviewed.      Significant Labs:  CBC:   Recent Labs   Lab 02/03/19  0708   WBC 9.16   RBC 4.00*   HGB 11.3*   HCT 36.8*      MCV 92   MCH 28.3   MCHC 30.7*     CMP:   Recent Labs   Lab 01/28/19  1151  02/03/19  0708      < > 104   CALCIUM 9.7   < > 8.7   ALBUMIN 3.8  --   --    PROT 7.8  --   --       < > 137   K 4.2   < > 4.0   CO2 24   < > 28      < > 104   BUN 14   < > 7   CREATININE 1.0   < > 0.7   ALKPHOS 61  --   --    ALT 55*  --   --    AST 24  --   --    BILITOT 0.5  --   --     < > = values in this interval not displayed.       Significant Diagnostics:  I have reviewed all pertinent imaging results/findings within the past 24 hours.    Assessment/Plan:     * Sigmoid diverticulitis    43 year old male failed outpt treatment for diverticulitis. POD 2 Johny's procedure. Back pain much improved. Pain overall well controlled. Tolerating clears.     -continue clears  -continue oral pain meds  -continuemuscle relaxant for back   -continue abx  -oob today  -DVT and GI ppx  -decrease fluids  -daily labs           Luis Cardenas MD  Colorectal Surgery  [facility]

## 2019-02-03 NOTE — ANESTHESIA POSTPROCEDURE EVALUATION
"Anesthesia Post Evaluation    Patient: Kyle Abel    Procedure(s) Performed: Procedure(s) (LRB):  COLECTOMY, SIGMOID, Colostomy (N/A)    Final Anesthesia Type: general  Patient location during evaluation: med/surg floor  Patient participation: Yes- Able to Participate  Level of consciousness: awake and alert and oriented  Post-procedure vital signs: reviewed and stable  Pain management: adequate  Airway patency: patent  PONV status at discharge: No PONV  Anesthetic complications: no      Cardiovascular status: blood pressure returned to baseline  Respiratory status: room air, unassisted and spontaneous ventilation  Hydration status: euvolemic  Follow-up not needed.        Visit Vitals  /61 (BP Location: Right arm, Patient Position: Lying)   Pulse 85   Temp 37 °C (98.6 °F) (Oral)   Resp 18   Ht 5' 10" (1.778 m)   Wt 126.1 kg (278 lb)   SpO2 96%   BMI 39.89 kg/m²       Pain/Daniel Score: Pain Rating Prior to Med Admin: 7 (2/3/2019  4:29 PM)  Pain Rating Post Med Admin: 5 (2/3/2019  1:40 PM)        "

## 2019-02-03 NOTE — SUBJECTIVE & OBJECTIVE
Interval History: No events    Review of Systems   Constitutional: Positive for appetite change and fever. Negative for activity change, chills and fatigue.   Respiratory: Negative for cough, chest tightness, shortness of breath and wheezing.    Cardiovascular: Negative for chest pain and leg swelling.   Gastrointestinal: Positive for abdominal pain. Negative for abdominal distention, anal bleeding, blood in stool, constipation, diarrhea, nausea, rectal pain and vomiting.   Genitourinary: Negative for difficulty urinating, enuresis, flank pain, frequency, genital sores and hematuria.   Musculoskeletal: Negative for back pain, gait problem and joint swelling.   Skin: Negative.  Negative for color change.   Neurological: Negative for dizziness, syncope and numbness.   Psychiatric/Behavioral: Negative for agitation, confusion, decreased concentration, dysphoric mood and hallucinations. The patient is not nervous/anxious and is not hyperactive.      Objective:     Vital Signs (Most Recent):  Temp: 97.8 °F (36.6 °C) (02/03/19 1211)  Pulse: 88 (02/03/19 1211)  Resp: 16 (02/03/19 1211)  BP: 130/84 (02/03/19 1211)  SpO2: 97 % (02/03/19 1211) Vital Signs (24h Range):  Temp:  [96.8 °F (36 °C)-98.7 °F (37.1 °C)] 97.8 °F (36.6 °C)  Pulse:  [75-88] 88  Resp:  [16-20] 16  SpO2:  [95 %-97 %] 97 %  BP: (108-132)/(64-84) 130/84     Weight: 126.1 kg (278 lb)  Body mass index is 39.89 kg/m².    Estimated Creatinine Clearance: 183.2 mL/min (based on SCr of 0.7 mg/dL).    Physical Exam   Constitutional: He is oriented to person, place, and time. He appears well-developed and well-nourished. No distress.   HENT:   Head: Normocephalic and atraumatic.   Eyes: EOM are normal. Pupils are equal, round, and reactive to light.   Cardiovascular: Normal rate, regular rhythm and normal heart sounds.   Pulmonary/Chest: Effort normal and breath sounds normal. No respiratory distress. He has no wheezes. He has no rales.   Abdominal: Soft. He exhibits  no distension and no mass. There is tenderness. There is no guarding.   Midline surgical incision bandage clean and dry   Drain in place with serosanguinous fluid drainage    Musculoskeletal: Normal range of motion.   Neurological: He is alert and oriented to person, place, and time.   Skin: Skin is warm and dry.   Psychiatric: He has a normal mood and affect. His behavior is normal. Judgment and thought content normal.   Nursing note and vitals reviewed.      Significant Labs:   Blood Culture:   Recent Labs   Lab 01/28/19  1356 01/28/19  1409   LABBLOO No growth after 5 days. No growth after 5 days.     BMP:   Recent Labs   Lab 02/03/19  0708         K 4.0      CO2 28   BUN 7   CREATININE 0.7   CALCIUM 8.7   MG 1.9     CBC:   Recent Labs   Lab 02/02/19  0510 02/03/19  0708   WBC 13.73* 9.16   HGB 12.0* 11.3*   HCT 38.0* 36.8*    274     All pertinent labs within the past 24 hours have been reviewed.    Significant Imaging: I have reviewed all pertinent imaging results/findings within the past 24 hours.     CT Abdo/Pelvis 1/28/19  1. In this patient with known sigmoid diverticulitis.  There is increased inflammation in the region, with an increase in relatively disorganized fluid.  There is 1 small focus of possible early fluid organization/abscess measuring approximately 2.3 x 1.1 cm.  No definite air within the fluid collection.  Scattered foci of air are noted about the region of diverticulitis suggesting sequela of micro perforation.  In comparison to the previous exam, this has increased.  Continued follow-up advised.  2. Hepatic steatosis, correlation with LFTs advised.  3. Additional findings above.

## 2019-02-03 NOTE — SUBJECTIVE & OBJECTIVE
Subjective:     Interval History: No acute events overnight. Pain controlled adequately. Tolerating clears. Ambulating.     Post-Op Info:  Procedure(s) (LRB):  COLECTOMY, SIGMOID, Colostomy (N/A)   2 Days Post-Op      Medications:  Continuous Infusions:   sodium chloride 0.9% 100 mL/hr at 02/01/19 2152     Scheduled Meds:   aztreonam  2,000 mg Intravenous Q8H    enoxaparin  40 mg Subcutaneous Daily    fluconazole (DIFLUCAN) IVPB  400 mg Intravenous Q24H    ibuprofen  800 mg Intravenous Q8H    methocarbamol  500 mg Oral QID    metronidazole  500 mg Intravenous Q8H    mupirocin  1 g Nasal BID    vancomycin (VANCOCIN) IVPB  2,250 mg Intravenous Q12H     PRN Meds:   HYDROmorphone    naloxone    ondansetron    oxyCODONE    oxyCODONE    promethazine (PHENERGAN) IVPB        Objective:     Vital Signs (Most Recent):  Temp: 97.9 °F (36.6 °C) (02/03/19 0725)  Pulse: 83 (02/03/19 0725)  Resp: 20 (02/03/19 0725)  BP: 119/76 (02/03/19 0725)  SpO2: 97 % (02/03/19 0725) Vital Signs (24h Range):  Temp:  [96.8 °F (36 °C)-98.7 °F (37.1 °C)] 97.9 °F (36.6 °C)  Pulse:  [75-85] 83  Resp:  [17-20] 20  SpO2:  [95 %-97 %] 97 %  BP: (108-132)/(64-76) 119/76     Intake/Output - Last 3 Shifts       02/01 0700 - 02/02 0659 02/02 0700 - 02/03 0659 02/03 0700 - 02/04 0659    P.O. 0 1300     I.V. (mL/kg) 4150 (32.9) 1200 (9.5)     IV Piggyback 650 1200     Total Intake(mL/kg) 4800 (38.1) 3700 (29.3)     Urine (mL/kg/hr) 2150 (0.7) 800 (0.3)     Emesis/NG output  0     Drains 270 105     Other  0     Stool 0 0     Blood 500      Total Output 2920 905     Net +1880 +2795            Urine Occurrence 4 x 5 x 1 x    Stool Occurrence 1 x 0 x     Emesis Occurrence 0 x 0 x           Physical Exam   Constitutional: He appears well-developed and well-nourished. No distress.   HENT:   Head: Normocephalic and atraumatic.   Eyes: EOM are normal.   Neck: Normal range of motion.   Cardiovascular: Normal rate.   Pulmonary/Chest: Effort normal.    Abdominal: Soft. He exhibits no distension. There is tenderness (appropriate incisional tenderness).   Midline dressing clean, dry, and intact. Ostomy viable with bowel sweat.    Musculoskeletal: Normal range of motion.   Neurological: He is alert.   Skin: Skin is warm and dry.   Psychiatric: He has a normal mood and affect. His behavior is normal.   Nursing note and vitals reviewed.      Significant Labs:  CBC:   Recent Labs   Lab 02/03/19  0708   WBC 9.16   RBC 4.00*   HGB 11.3*   HCT 36.8*      MCV 92   MCH 28.3   MCHC 30.7*     CMP:   Recent Labs   Lab 01/28/19  1151  02/03/19  0708      < > 104   CALCIUM 9.7   < > 8.7   ALBUMIN 3.8  --   --    PROT 7.8  --   --       < > 137   K 4.2   < > 4.0   CO2 24   < > 28      < > 104   BUN 14   < > 7   CREATININE 1.0   < > 0.7   ALKPHOS 61  --   --    ALT 55*  --   --    AST 24  --   --    BILITOT 0.5  --   --     < > = values in this interval not displayed.       Significant Diagnostics:  I have reviewed all pertinent imaging results/findings within the past 24 hours.

## 2019-02-03 NOTE — ASSESSMENT & PLAN NOTE
"- CT shows increased inflammation in the sigmoid region, cocnern for fluid organization/abscess formation and microperforation, repeat shows evolution and worsening  - No improvement with outpatient course of PO cipro and flagyl   - ID consulted for abx recs in pt with severe PCN allergy  - Pt unsure if ever exposed to Cephalosporins  - "Failed" outpatient therapy with Cipro and Flagyl likely due to lack of source control      Plan  1. Give lack of growth on culture would stop aztreonam and vanco and start po cipro and flagyl (do not think he failed these but instead that he needed source control)  2. Continue fluconazole   3. S/p Sigmoid colectomy for source control   4. Will follow blood cultures and OR cultures, NGTD  5. Repeat abdominal imaging with drain removal to ensure resolution of abscess.   "

## 2019-02-03 NOTE — ASSESSMENT & PLAN NOTE
43 year old male failed outpt treatment for diverticulitis. POD 2 Johny's procedure. Back pain much improved. Pain overall well controlled. Tolerating clears.     -continue clears  -continue oral pain meds  -continuemuscle relaxant for back   -continue abx  -oob today  -DVT and GI ppx  -decrease fluids  -daily labs

## 2019-02-03 NOTE — PROGRESS NOTES
"[facility]  Infectious Disease  Progress Note    Patient Name: Kyle Abel  MRN: [id number]  Admission Date: 1/28/2019  Length of Stay: 6 days  Attending Physician: Samir Guidry MD  Primary Care Provider: Gilma Cuba NP    Isolation Status: No active isolations  Assessment/Plan:      * Sigmoid diverticulitis    - CT shows increased inflammation in the sigmoid region, cocnern for fluid organization/abscess formation and microperforation, repeat shows evolution and worsening  - No improvement with outpatient course of PO cipro and flagyl   - ID consulted for abx recs in pt with severe PCN allergy  - Pt unsure if ever exposed to Cephalosporins  - "Failed" outpatient therapy with Cipro and Flagyl likely due to lack of source control      Plan  1. Give lack of growth on culture would stop aztreonam and vanco and start po cipro and flagyl (do not think he failed these but instead that he needed source control)  2. Continue fluconazole   3. S/p Sigmoid colectomy for source control   4. Will follow blood cultures and OR cultures, NGTD  5. Repeat abdominal imaging with drain removal to ensure resolution of abscess.            Thank you for your consult. I will follow-up with patient. Please contact us if you have any additional questions.    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 Pt initially presented to [facility] ED 1/20/19 with abdominal pain. CT showed sigmoid diverticulitis at the time. Pt was discharged home with a course of PO Cipro and Flagyl with which he endorse compliance. Pt states that he initially felt better with the antibiotics but acutely worsened on 1/26. Pt represented to [facility] ED on 1/28 with increasing abdominal pain and low grade fevers (Home Tmax 100.4). " "    Repeat CT shows "increased inflammation in the region opf known sigmoid diversticulits, with an increase in relatively disorganized fluid.  There is 1 small focus of possible early fluid organization/abscess measuring approximately 2.3 x 1.1 cm.  No definite air within the fluid collection.  Scattered foci of air are noted about the region of diverticulitis suggesting sequela of micro perforation.  In comparison to the previous exam, this has increased.     Pt was admitted to Colorectal surgery and ID was consulted for antibiotic recommendations in pt with PCN allergy.     In ED, pt was started on Aztreonam 1g q8, Flagyl 500mg q8, Vancomycin 1.5g q12.   Interval History: No events    Review of Systems   Constitutional: Positive for appetite change and fever. Negative for activity change, chills and fatigue.   Respiratory: Negative for cough, chest tightness, shortness of breath and wheezing.    Cardiovascular: Negative for chest pain and leg swelling.   Gastrointestinal: Positive for abdominal pain. Negative for abdominal distention, anal bleeding, blood in stool, constipation, diarrhea, nausea, rectal pain and vomiting.   Genitourinary: Negative for difficulty urinating, enuresis, flank pain, frequency, genital sores and hematuria.   Musculoskeletal: Negative for back pain, gait problem and joint swelling.   Skin: Negative.  Negative for color change.   Neurological: Negative for dizziness, syncope and numbness.   Psychiatric/Behavioral: Negative for agitation, confusion, decreased concentration, dysphoric mood and hallucinations. The patient is not nervous/anxious and is not hyperactive.      Objective:     Vital Signs (Most Recent):  Temp: 97.8 °F (36.6 °C) (02/03/19 1211)  Pulse: 88 (02/03/19 1211)  Resp: 16 (02/03/19 1211)  BP: 130/84 (02/03/19 1211)  SpO2: 97 % (02/03/19 1211) Vital Signs (24h Range):  Temp:  [96.8 °F (36 °C)-98.7 °F (37.1 °C)] 97.8 °F (36.6 °C)  Pulse:  [75-88] 88  Resp:  [16-20] " 16  SpO2:  [95 %-97 %] 97 %  BP: (108-132)/(64-84) 130/84     Weight: 126.1 kg (278 lb)  Body mass index is 39.89 kg/m².    Estimated Creatinine Clearance: 183.2 mL/min (based on SCr of 0.7 mg/dL).    Physical Exam   Constitutional: He is oriented to person, place, and time. He appears well-developed and well-nourished. No distress.   HENT:   Head: Normocephalic and atraumatic.   Eyes: EOM are normal. Pupils are equal, round, and reactive to light.   Cardiovascular: Normal rate, regular rhythm and normal heart sounds.   Pulmonary/Chest: Effort normal and breath sounds normal. No respiratory distress. He has no wheezes. He has no rales.   Abdominal: Soft. He exhibits no distension and no mass. There is tenderness. There is no guarding.   Midline surgical incision bandage clean and dry   Drain in place with serosanguinous fluid drainage    Musculoskeletal: Normal range of motion.   Neurological: He is alert and oriented to person, place, and time.   Skin: Skin is warm and dry.   Psychiatric: He has a normal mood and affect. His behavior is normal. Judgment and thought content normal.   Nursing note and vitals reviewed.      Significant Labs:   Blood Culture:   Recent Labs   Lab 01/28/19  1356 01/28/19  1409   LABBLOO No growth after 5 days. No growth after 5 days.     BMP:   Recent Labs   Lab 02/03/19  0708         K 4.0      CO2 28   BUN 7   CREATININE 0.7   CALCIUM 8.7   MG 1.9     CBC:   Recent Labs   Lab 02/02/19  0510 02/03/19  0708   WBC 13.73* 9.16   HGB 12.0* 11.3*   HCT 38.0* 36.8*    274     All pertinent labs within the past 24 hours have been reviewed.    Significant Imaging: I have reviewed all pertinent imaging results/findings within the past 24 hours.     CT Abdo/Pelvis 1/28/19  1. In this patient with known sigmoid diverticulitis.  There is increased inflammation in the region, with an increase in relatively disorganized fluid.  There is 1 small focus of possible early fluid  organization/abscess measuring approximately 2.3 x 1.1 cm.  No definite air within the fluid collection.  Scattered foci of air are noted about the region of diverticulitis suggesting sequela of micro perforation.  In comparison to the previous exam, this has increased.  Continued follow-up advised.  2. Hepatic steatosis, correlation with LFTs advised.  3. Additional findings above.

## 2019-02-04 LAB
ANION GAP SERPL CALC-SCNC: 6 MMOL/L
BACTERIA SPEC AEROBE CULT: NO GROWTH
BASOPHILS # BLD AUTO: 0.07 K/UL
BASOPHILS NFR BLD: 1.1 %
BUN SERPL-MCNC: 6 MG/DL
CALCIUM SERPL-MCNC: 8.2 MG/DL
CHLORIDE SERPL-SCNC: 106 MMOL/L
CO2 SERPL-SCNC: 25 MMOL/L
CREAT SERPL-MCNC: 0.7 MG/DL
DIFFERENTIAL METHOD: ABNORMAL
EOSINOPHIL # BLD AUTO: 0.3 K/UL
EOSINOPHIL NFR BLD: 4.6 %
ERYTHROCYTE [DISTWIDTH] IN BLOOD BY AUTOMATED COUNT: 14.6 %
EST. GFR  (AFRICAN AMERICAN): >60 ML/MIN/1.73 M^2
EST. GFR  (NON AFRICAN AMERICAN): >60 ML/MIN/1.73 M^2
GLUCOSE SERPL-MCNC: 100 MG/DL
HCT VFR BLD AUTO: 35.7 %
HGB BLD-MCNC: 11.1 G/DL
IMM GRANULOCYTES # BLD AUTO: 0.25 K/UL
IMM GRANULOCYTES NFR BLD AUTO: 4.1 %
LYMPHOCYTES # BLD AUTO: 1.2 K/UL
LYMPHOCYTES NFR BLD: 19.1 %
MAGNESIUM SERPL-MCNC: 1.8 MG/DL
MCH RBC QN AUTO: 27.9 PG
MCHC RBC AUTO-ENTMCNC: 31.1 G/DL
MCV RBC AUTO: 90 FL
MONOCYTES # BLD AUTO: 0.8 K/UL
MONOCYTES NFR BLD: 12.4 %
NEUTROPHILS # BLD AUTO: 3.6 K/UL
NEUTROPHILS NFR BLD: 58.7 %
NRBC BLD-RTO: 0 /100 WBC
PHOSPHATE SERPL-MCNC: 3.4 MG/DL
PLATELET # BLD AUTO: 259 K/UL
PMV BLD AUTO: 10.8 FL
POTASSIUM SERPL-SCNC: 3.5 MMOL/L
RBC # BLD AUTO: 3.98 M/UL
SODIUM SERPL-SCNC: 137 MMOL/L
WBC # BLD AUTO: 6.12 K/UL

## 2019-02-04 PROCEDURE — 80048 BASIC METABOLIC PNL TOTAL CA: CPT

## 2019-02-04 PROCEDURE — 25000003 PHARM REV CODE 250: Performed by: INTERNAL MEDICINE

## 2019-02-04 PROCEDURE — S0073 INJECTION, AZTREONAM, 500 MG: HCPCS | Performed by: INTERNAL MEDICINE

## 2019-02-04 PROCEDURE — 25000003 PHARM REV CODE 250: Performed by: STUDENT IN AN ORGANIZED HEALTH CARE EDUCATION/TRAINING PROGRAM

## 2019-02-04 PROCEDURE — 20600001 HC STEP DOWN PRIVATE ROOM

## 2019-02-04 PROCEDURE — 99232 PR SUBSEQUENT HOSPITAL CARE,LEVL II: ICD-10-PCS | Mod: ,,, | Performed by: INTERNAL MEDICINE

## 2019-02-04 PROCEDURE — 63600175 PHARM REV CODE 636 W HCPCS: Performed by: STUDENT IN AN ORGANIZED HEALTH CARE EDUCATION/TRAINING PROGRAM

## 2019-02-04 PROCEDURE — C9113 INJ PANTOPRAZOLE SODIUM, VIA: HCPCS | Performed by: STUDENT IN AN ORGANIZED HEALTH CARE EDUCATION/TRAINING PROGRAM

## 2019-02-04 PROCEDURE — 85025 COMPLETE CBC W/AUTO DIFF WBC: CPT

## 2019-02-04 PROCEDURE — 83735 ASSAY OF MAGNESIUM: CPT

## 2019-02-04 PROCEDURE — 84100 ASSAY OF PHOSPHORUS: CPT

## 2019-02-04 PROCEDURE — 99232 SBSQ HOSP IP/OBS MODERATE 35: CPT | Mod: ,,, | Performed by: INTERNAL MEDICINE

## 2019-02-04 PROCEDURE — 36415 COLL VENOUS BLD VENIPUNCTURE: CPT

## 2019-02-04 RX ORDER — CIPROFLOXACIN 500 MG/1
500 TABLET ORAL EVERY 12 HOURS
Status: DISCONTINUED | OUTPATIENT
Start: 2019-02-04 | End: 2019-02-05

## 2019-02-04 RX ORDER — METRONIDAZOLE 500 MG/1
500 TABLET ORAL EVERY 8 HOURS
Status: DISCONTINUED | OUTPATIENT
Start: 2019-02-04 | End: 2019-02-05

## 2019-02-04 RX ORDER — PANTOPRAZOLE SODIUM 40 MG/10ML
40 INJECTION, POWDER, LYOPHILIZED, FOR SOLUTION INTRAVENOUS DAILY
Status: DISCONTINUED | OUTPATIENT
Start: 2019-02-04 | End: 2019-02-05

## 2019-02-04 RX ORDER — FLUCONAZOLE 200 MG/1
400 TABLET ORAL DAILY
Status: DISCONTINUED | OUTPATIENT
Start: 2019-02-05 | End: 2019-02-05

## 2019-02-04 RX ADMIN — ENOXAPARIN SODIUM 40 MG: 100 INJECTION SUBCUTANEOUS at 10:02

## 2019-02-04 RX ADMIN — OXYCODONE HYDROCHLORIDE 10 MG: 10 TABLET ORAL at 10:02

## 2019-02-04 RX ADMIN — METRONIDAZOLE 500 MG: 500 TABLET ORAL at 01:02

## 2019-02-04 RX ADMIN — CIPROFLOXACIN HYDROCHLORIDE 500 MG: 500 TABLET, FILM COATED ORAL at 10:02

## 2019-02-04 RX ADMIN — METHOCARBAMOL TABLETS 500 MG: 500 TABLET, COATED ORAL at 01:02

## 2019-02-04 RX ADMIN — VANCOMYCIN HYDROCHLORIDE 2250 MG: 1 INJECTION, POWDER, LYOPHILIZED, FOR SOLUTION INTRAVENOUS at 04:02

## 2019-02-04 RX ADMIN — METHOCARBAMOL TABLETS 500 MG: 500 TABLET, COATED ORAL at 09:02

## 2019-02-04 RX ADMIN — HYDROMORPHONE HYDROCHLORIDE 1 MG: 1 INJECTION, SOLUTION INTRAMUSCULAR; INTRAVENOUS; SUBCUTANEOUS at 05:02

## 2019-02-04 RX ADMIN — OXYCODONE HYDROCHLORIDE 10 MG: 10 TABLET ORAL at 12:02

## 2019-02-04 RX ADMIN — OXYCODONE HYDROCHLORIDE 10 MG: 10 TABLET ORAL at 09:02

## 2019-02-04 RX ADMIN — OXYCODONE HYDROCHLORIDE 10 MG: 10 TABLET ORAL at 04:02

## 2019-02-04 RX ADMIN — HYDROMORPHONE HYDROCHLORIDE 1 MG: 1 INJECTION, SOLUTION INTRAMUSCULAR; INTRAVENOUS; SUBCUTANEOUS at 06:02

## 2019-02-04 RX ADMIN — IBUPROFEN 800 MG: 800 INJECTION INTRAVENOUS at 06:02

## 2019-02-04 RX ADMIN — PANTOPRAZOLE SODIUM 40 MG: 40 INJECTION, POWDER, FOR SOLUTION INTRAVENOUS at 01:02

## 2019-02-04 RX ADMIN — CIPROFLOXACIN HYDROCHLORIDE 500 MG: 500 TABLET, FILM COATED ORAL at 09:02

## 2019-02-04 RX ADMIN — AZTREONAM 2000 MG: 2 INJECTION, POWDER, LYOPHILIZED, FOR SOLUTION INTRAMUSCULAR; INTRAVENOUS at 02:02

## 2019-02-04 RX ADMIN — METRONIDAZOLE 500 MG: 500 TABLET ORAL at 09:02

## 2019-02-04 RX ADMIN — HYDROMORPHONE HYDROCHLORIDE 1 MG: 1 INJECTION, SOLUTION INTRAMUSCULAR; INTRAVENOUS; SUBCUTANEOUS at 01:02

## 2019-02-04 RX ADMIN — METHOCARBAMOL TABLETS 500 MG: 500 TABLET, COATED ORAL at 10:02

## 2019-02-04 RX ADMIN — METHOCARBAMOL TABLETS 500 MG: 500 TABLET, COATED ORAL at 05:02

## 2019-02-04 NOTE — SUBJECTIVE & OBJECTIVE
Interval History: NAEON. Continue PO Abx for 2 week course, repeat abdominal imaging with removal of drain to ensure no further intraabdominal collection or infection.     Review of Systems   Constitutional: Positive for appetite change and fever. Negative for activity change, chills and fatigue.   Respiratory: Negative for cough, chest tightness, shortness of breath and wheezing.    Cardiovascular: Negative for chest pain and leg swelling.   Gastrointestinal: Positive for abdominal pain. Negative for abdominal distention, anal bleeding, blood in stool, constipation, diarrhea, nausea, rectal pain and vomiting.   Genitourinary: Negative for difficulty urinating, enuresis, flank pain, frequency, genital sores and hematuria.   Musculoskeletal: Negative for back pain, gait problem and joint swelling.   Skin: Negative.  Negative for color change.   Neurological: Negative for dizziness, syncope and numbness.   Psychiatric/Behavioral: Negative for agitation, confusion, decreased concentration, dysphoric mood and hallucinations. The patient is not nervous/anxious and is not hyperactive.      Objective:     Vital Signs (Most Recent):  Temp: 98.9 °F (37.2 °C) (02/04/19 1027)  Pulse: 72 (02/04/19 1027)  Resp: 20 (02/04/19 1027)  BP: 117/71 (02/04/19 1027)  SpO2: 95 % (02/04/19 1027) Vital Signs (24h Range):  Temp:  [98.3 °F (36.8 °C)-99.1 °F (37.3 °C)] 98.9 °F (37.2 °C)  Pulse:  [67-85] 72  Resp:  [18-20] 20  SpO2:  [95 %-96 %] 95 %  BP: (113-118)/(61-71) 117/71     Weight: 126.1 kg (278 lb)  Body mass index is 39.89 kg/m².    Estimated Creatinine Clearance: 183.2 mL/min (based on SCr of 0.7 mg/dL).    Physical Exam   Constitutional: He is oriented to person, place, and time. He appears well-developed and well-nourished. No distress.   HENT:   Head: Normocephalic and atraumatic.   Eyes: EOM are normal. Pupils are equal, round, and reactive to light.   Cardiovascular: Normal rate, regular rhythm and normal heart sounds.    Pulmonary/Chest: Effort normal and breath sounds normal. No respiratory distress. He has no wheezes. He has no rales.   Abdominal: Soft. He exhibits no distension and no mass. There is tenderness. There is no guarding.   Midline surgical incision bandage clean and dry   Drain in place with serosanguinous fluid drainage    Musculoskeletal: Normal range of motion.   Neurological: He is alert and oriented to person, place, and time.   Skin: Skin is warm and dry.   Psychiatric: He has a normal mood and affect. His behavior is normal. Judgment and thought content normal.   Nursing note and vitals reviewed.      Significant Labs:   Blood Culture:   Recent Labs   Lab 01/28/19  1356 01/28/19  1409   LABBLOO No growth after 5 days. No growth after 5 days.     BMP:   Recent Labs   Lab 02/04/19  0525         K 3.5      CO2 25   BUN 6   CREATININE 0.7   CALCIUM 8.2*   MG 1.8     CBC:   Recent Labs   Lab 02/03/19  0708 02/04/19  0525   WBC 9.16 6.12   HGB 11.3* 11.1*   HCT 36.8* 35.7*    259     All pertinent labs within the past 24 hours have been reviewed.    Significant Imaging: I have reviewed all pertinent imaging results/findings within the past 24 hours.     CT Abdo/Pelvis 1/28/19  1. In this patient with known sigmoid diverticulitis.  There is increased inflammation in the region, with an increase in relatively disorganized fluid.  There is 1 small focus of possible early fluid organization/abscess measuring approximately 2.3 x 1.1 cm.  No definite air within the fluid collection.  Scattered foci of air are noted about the region of diverticulitis suggesting sequela of micro perforation.  In comparison to the previous exam, this has increased.  Continued follow-up advised.  2. Hepatic steatosis, correlation with LFTs advised.  3. Additional findings above.

## 2019-02-04 NOTE — OP NOTE
DATE OF PROCEDURE:  02/01/2019.    PREOPERATIVE DIAGNOSIS:  Perforated diverticulitis.    POSTOPERATIVE DIAGNOSES:  Perforated diverticulitis with purulent peritonitis.    PROCEDURES PERFORMED:  Exploratory laparotomy with sigmoid colectomy, end   descending colostomy, Johny's closure of the rectal stump, abdominal lavage.    SURGEON:  Samir Guidry M.D.    ASSISTANT:  Kyle Mcdaniel M.D. (RES).    ANESTHESIA:  General endotracheal.    IV FLUIDS:  2050 mL of crystalloid.    ESTIMATED BLOOD LOSS:  500 mL.    URINE OUTPUT:  500 mL.    SPECIMENS:  1.  Sigmoid colon.  2.  Abdominal fluid for culture and sensitivity.    DRAINS:  Mcbride catheter and a #10 Sebastian drain in the pelvis.    COMPLICATIONS:  None.    OPERATIVE FINDINGS:  Severe sigmoid diverticulitis with purulent peritonitis.    INDICATIONS FOR PROCEDURE:  The patient is a 43-year-old male who was admitted   to the hospital with persistent diverticulitis after initially starting outpatient treatment   with oral antibiotics but continued to worsen.  Over the course of his hospital stay,   his condition continued to progressively worsen, and he is now being brought to the   Operating Room for surgical management.    DESCRIPTION OF PROCEDURE:  The patient was identified, brought to the Operating   Room, placed on the table in a supine position after obtaining informed consent.    Venous sequential stockings were placed.  He was given preoperative   intravenous antibiotics.  He was already receiving therapeutic antibiotics   during his hospital stay, so prophylactic antibiotics were not required.  After   induction of general endotracheal anesthesia, he was repositioned in a modified   lithotomy position using Yellofin stirrups and a Mcbride catheter was placed using   sterile technique.    The abdomen and perineum were prepped and draped in the usual sterile fashion.    My initial intention was to perform a hand-assisted procedure.  We made an 8 mm   low midline  incision, which was carried down through skin, subcutaneous tissue   and fascia to enter the abdominal cavity.  Immediately upon entering the   abdomen, the sigmoid colon was noted to be densely matted to the undersurface of   the abdominal wall and there was a moderate degree of pus encountered in the   abdominal cavity.  Given this, I elected to proceed with an open procedure.    The incision was extended superiorly to a point just above the umbilicus.  An   Domenico wound protector was placed.  We cultured the fluid in the abdomen.  The   sigmoid colon was identified and noted to be markedly thickened and indurated   with fibrinous exudate covering a good portion of it.  We incised the lateral   attachments of the proximal sigmoid colon where it was not as diseased.  We then   carried our mobilization distally, mobilizing the diseased colon and its mesentery    medially off of the retroperitoneum.  The left ureter was identified and protected.    Once we had fully mobilized the sigmoid colon, the junction of the descending and   sigmoid colon was divided with a BRICE stapler.  We then divided the mesentery of the   sigmoid colon with the EnSeal down to the pedicle of the inferior mesenteric   artery.  The pedicle of the inferior mesenteric artery was divided between Margie   clamps, securing the pedicles with 0-Vicryl suture ligature.  We then mobilized   the upper portion of the rectum as well, as the inflammatory process extended   down into the pelvis to involve the rectosigmoid junction and upper rectum.  We   mobilized the rectum down to a point where it appeared to be grossly normal.    The mesorectum was cleared with the EnSeal device and the rectum was stapled   with a TA stapler and divided proximal to the staple line.  The resected sigmoid   colon was passed from the field.  We then copiously irrigated the pelvis.    Again, there was a large volume of purulent fluid as well as fibrinous exudate   throughout  the pelvis.  We mobilized the left colon up to the splenic flexure   and divided the mesentery so that we could bring the colon out as an end   colostomy, as I did not feel comfortable performing a primary anastomosis in the   setting of purulent peritonitis.  A trephination was created on the left side   of the abdomen using a muscle-splitting technique and the distal end of the   colon was exteriorized and secured with a Greenview clamp.  We then ran the small   bowel from the ligament of Treitz to the ileocecal valve.  There were several   areas where there was fibrinous peel creating interloop adhesions.  All of the   interloop adhesions were freed up from the ligament of Treitz down to the   ileocecal valve.  The remaining colon was visualized and appeared to be grossly   normal.    We once again irrigated the pelvis.  Hemostasis was noted to be adequate.  A #10   Sebastian drain was placed through a stab incision in the right lower quadrant and   directed into the pelvis adjacent to the staple line.  We placed Seprafilm in   the pelvis as well as between the bowel and the omentum and between the omentum   and the anterior abdominal wall.  A TAP block was performed using a  combination of Exparel and 0.5% Marcaine diluted in injectable saline.  We then   closed the midline fascia with a running looped #1 PDS suture.  Subcutaneous   tissues were irrigated.  Skin was closed using a stapler.  We then turned our   attention towards maturing the colostomy.  The distal staple line was then   excised with electrocautery and the ostomy was matured, placing 3-0 chromic   Alina sutures around its perimeter.  An ostomy appliance was cut to the   appropriate size and placed over the stoma.  Sterile dressings were applied to   the midline incision and the Sebastian drain was connected to bulb suction.    The patient tolerated the procedure well with no complications.  He was   extubated in the Operating Room and taken to Recovery in  a satisfactory   condition.  All needle, instrument and sponge counts were correct at the end of   the case.  I was present throughout the entire procedure.      SOFI  dd: 02/04/2019 08:17:42 (CST)  td: 02/04/2019 08:48:46 (CST)  Doc ID   #[id number]  Job ID #[id number]    CC:

## 2019-02-04 NOTE — ASSESSMENT & PLAN NOTE
"- CT shows increased inflammation in the sigmoid region, cocnern for fluid organization/abscess formation and microperforation, repeat shows evolution and worsening  - No improvement with outpatient course of PO cipro and flagyl   - ID consulted for abx recs in pt with severe PCN allergy  - Pt unsure if ever exposed to Cephalosporins  - "Failed" outpatient therapy with Cipro and Flagyl likely due to lack of source control      Plan  1. Continue po cipro and flagyl for 2 weeks from last negative culture.   2. Continue fluconazole   3. S/p Sigmoid colectomy for source control   4. Will follow blood cultures and OR cultures, NGTD  5. Repeat abdominal imaging with drain removal to ensure resolution of abscess, will extend abx duration if any sign of continued infection  "

## 2019-02-04 NOTE — SUBJECTIVE & OBJECTIVE
Subjective:     Interval History: No acute events overnight. Pain well controlled. No nausea or vomiting. Tolerating clears. Ambulating.     Post-Op Info:  Procedure(s) (LRB):  COLECTOMY, SIGMOID, Colostomy (N/A)   3 Days Post-Op      Medications:  Continuous Infusions:  Scheduled Meds:   ciprofloxacin HCl  500 mg Oral Q12H    enoxaparin  40 mg Subcutaneous Daily    fluconazole (DIFLUCAN) IVPB  400 mg Intravenous Q24H    methocarbamol  500 mg Oral QID    metroNIDAZOLE  500 mg Oral Q8H     PRN Meds:   HYDROmorphone    naloxone    ondansetron    oxyCODONE    oxyCODONE    promethazine (PHENERGAN) IVPB        Objective:     Vital Signs (Most Recent):  Temp: 98.3 °F (36.8 °C) (02/04/19 0055)  Pulse: 67 (02/04/19 0055)  Resp: 18 (02/04/19 0055)  BP: 118/70 (02/04/19 0055)  SpO2: 95 % (02/04/19 0400) Vital Signs (24h Range):  Temp:  [97.8 °F (36.6 °C)-99.1 °F (37.3 °C)] 98.3 °F (36.8 °C)  Pulse:  [67-88] 67  Resp:  [16-18] 18  SpO2:  [95 %-97 %] 95 %  BP: (113-130)/(61-84) 118/70     Intake/Output - Last 3 Shifts       02/02 0700 - 02/03 0659 02/03 0700 - 02/04 0659 02/04 0700 - 02/05 0659    P.O. 1300 1640     I.V. (mL/kg) 1200 (9.5) 2537.5 (20.1)     IV Piggyback 1200 2000     Total Intake(mL/kg) 3700 (29.3) 6177.5 (49)     Urine (mL/kg/hr) 800 (0.3) 1000 (0.3)     Emesis/NG output 0      Drains 105 30     Other 0      Stool 0 20     Blood       Total Output 905 1050     Net +2795 +5127.5            Urine Occurrence 5 x 6 x     Stool Occurrence 0 x      Emesis Occurrence 0 x            Physical Exam   Constitutional: He appears well-developed and well-nourished. No distress.   HENT:   Head: Normocephalic and atraumatic.   Eyes: EOM are normal.   Neck: Normal range of motion.   Cardiovascular: Normal rate.   Pulmonary/Chest: Effort normal.   Abdominal: Soft. He exhibits no distension. There is tenderness (appropriate incisional tenderness).   Midline dressing clean, dry, and intact. Ostomy viable with bowel  sweat.    Musculoskeletal: Normal range of motion.   Neurological: He is alert.   Skin: Skin is warm and dry.   Psychiatric: He has a normal mood and affect. His behavior is normal.   Nursing note and vitals reviewed.      Significant Labs:  CBC:   Recent Labs   Lab 02/04/19  0525   WBC 6.12   RBC 3.98*   HGB 11.1*   HCT 35.7*      MCV 90   MCH 27.9   MCHC 31.1*     CMP:   Recent Labs   Lab 01/28/19  1151  02/04/19  0525      < > 100   CALCIUM 9.7   < > 8.2*   ALBUMIN 3.8  --   --    PROT 7.8  --   --       < > 137   K 4.2   < > 3.5   CO2 24   < > 25      < > 106   BUN 14   < > 6   CREATININE 1.0   < > 0.7   ALKPHOS 61  --   --    ALT 55*  --   --    AST 24  --   --    BILITOT 0.5  --   --     < > = values in this interval not displayed.       Significant Diagnostics:  I have reviewed all pertinent imaging results/findings within the past 24 hours.

## 2019-02-04 NOTE — PT/OT/SLP PROGRESS
Physical Therapy      Patient Name:  Kyle Abel   MRN:  [id number]    Patient not seen today secondary to pt observed ambulating in the keys independently and pt states he has no PT needs at this time. D/C PT    Chloé Will, PT 2/4/19

## 2019-02-04 NOTE — PLAN OF CARE
Problem: Occupational Therapy Goal  Goal: Occupational Therapy Goal  Patient will complete functional t/f's with modified independence MET  Patient will complete toilet t/f with modified independence MET  Patient will complete G/H tasks standing at sink with modified independence MET  Patient will demonstrate modified independence for toileting MET  Outcome: Outcome(s) achieved Date Met: 02/04/19  Pt reporting independence with ADLs and mobility; requesting OT/PT orders be d/c     Comments: D/C OT 2/4/2019    Rosa Weinstein, OT  2/4/2019

## 2019-02-04 NOTE — PROGRESS NOTES
"[facility]  Infectious Disease  Progress Note    Patient Name: Kyle Abel  MRN: [id number]  Admission Date: 1/28/2019  Length of Stay: 7 days  Attending Physician: Samir Guidry MD  Primary Care Provider: Gilma Cuba NP    Isolation Status: No active isolations  Assessment/Plan:      * Sigmoid diverticulitis    - CT shows increased inflammation in the sigmoid region, cocnern for fluid organization/abscess formation and microperforation, repeat shows evolution and worsening  - No improvement with outpatient course of PO cipro and flagyl   - ID consulted for abx recs in pt with severe PCN allergy  - Pt unsure if ever exposed to Cephalosporins  - "Failed" outpatient therapy with Cipro and Flagyl likely due to lack of source control      Plan  1. Continue po cipro and flagyl for 2 weeks from last negative culture.   2. Continue fluconazole   3. S/p Sigmoid colectomy for source control   4. Will follow blood cultures and OR cultures, NGTD  5. Repeat abdominal imaging with drain removal to ensure resolution of abscess, will extend abx duration if any sign of continued infection           Thank you for your consult. I will sign off. Please contact us if you have any additional questions.    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 Pt initially presented to [facility] ED 1/20/19 with abdominal pain. CT showed sigmoid diverticulitis at the time. Pt was discharged home with a course of PO Cipro and Flagyl with which he endorse compliance. Pt states that he initially felt better with the antibiotics but acutely worsened on 1/26. Pt [place] [facility] [place] 1/28 with increasing abdominal pain and low grade fevers (Home Tmax 100.4).     Repeat CT shows "increased inflammation in " the region opf known sigmoid diversticulits, with an increase in relatively disorganized fluid.  There is 1 small focus of possible early fluid organization/abscess measuring approximately 2.3 x 1.1 cm.  No definite air within the fluid collection.  Scattered foci of air are noted about the region of diverticulitis suggesting sequela of micro perforation.  In comparison to the previous exam, this has increased.     Pt was admitted to Colorectal surgery and ID was consulted for antibiotic recommendations in pt with PCN allergy.     In ED, pt was started on Aztreonam 1g q8, Flagyl 500mg q8, Vancomycin 1.5g q12.   Interval History: NAEON. Continue PO Abx for 2 week course, repeat abdominal imaging with removal of drain to ensure no further intraabdominal collection or infection.     Review of Systems   Constitutional: Positive for appetite change and fever. Negative for activity change, chills and fatigue.   Respiratory: Negative for cough, chest tightness, shortness of breath and wheezing.    Cardiovascular: Negative for chest pain and leg swelling.   Gastrointestinal: Positive for abdominal pain. Negative for abdominal distention, anal bleeding, blood in stool, constipation, diarrhea, nausea, rectal pain and vomiting.   Genitourinary: Negative for difficulty urinating, enuresis, flank pain, frequency, genital sores and hematuria.   Musculoskeletal: Negative for back pain, gait problem and joint swelling.   Skin: Negative.  Negative for color change.   Neurological: Negative for dizziness, syncope and numbness.   Psychiatric/Behavioral: Negative for agitation, confusion, decreased concentration, dysphoric mood and hallucinations. The patient is not nervous/anxious and is not hyperactive.      Objective:     Vital Signs (Most Recent):  Temp: 98.9 °F (37.2 °C) (02/04/19 1027)  Pulse: 72 (02/04/19 1027)  Resp: 20 (02/04/19 1027)  BP: 117/71 (02/04/19 1027)  SpO2: 95 % (02/04/19 1027) Vital Signs (24h Range):  Temp:   [98.3 °F (36.8 °C)-99.1 °F (37.3 °C)] 98.9 °F (37.2 °C)  Pulse:  [67-85] 72  Resp:  [18-20] 20  SpO2:  [95 %-96 %] 95 %  BP: (113-118)/(61-71) 117/71     Weight: 126.1 kg (278 lb)  Body mass index is 39.89 kg/m².    Estimated Creatinine Clearance: 183.2 mL/min (based on SCr of 0.7 mg/dL).    Physical Exam   Constitutional: He is oriented to person, place, and time. He appears well-developed and well-nourished. No distress.   HENT:   Head: Normocephalic and atraumatic.   Eyes: EOM are normal. Pupils are equal, round, and reactive to light.   Cardiovascular: Normal rate, regular rhythm and normal heart sounds.   Pulmonary/Chest: Effort normal and breath sounds normal. No respiratory distress. He has no wheezes. He has no rales.   Abdominal: Soft. He exhibits no distension and no mass. There is tenderness. There is no guarding.   Midline surgical incision bandage clean and dry   Drain in place with serosanguinous fluid drainage    Musculoskeletal: Normal range of motion.   Neurological: He is alert and oriented to person, place, and time.   Skin: Skin is warm and dry.   Psychiatric: He has a normal mood and affect. His behavior is normal. Judgment and thought content normal.   Nursing note and vitals reviewed.      Significant Labs:   Blood Culture:   Recent Labs   Lab 01/28/19  1356 01/28/19  1409   LABBLOO No growth after 5 days. No growth after 5 days.     BMP:   Recent Labs   Lab 02/04/19  0525         K 3.5      CO2 25   BUN 6   CREATININE 0.7   CALCIUM 8.2*   MG 1.8     CBC:   Recent Labs   Lab 02/03/19  0708 02/04/19  0525   WBC 9.16 6.12   HGB 11.3* 11.1*   HCT 36.8* 35.7*    259     All pertinent labs within the past 24 hours have been reviewed.    Significant Imaging: I have reviewed all pertinent imaging results/findings within the past 24 hours.     CT Abdo/Pelvis 1/28/19  1. In this patient with known sigmoid diverticulitis.  There is increased inflammation in the region, with an  increase in relatively disorganized fluid.  There is 1 small focus of possible early fluid organization/abscess measuring approximately 2.3 x 1.1 cm.  No definite air within the fluid collection.  Scattered foci of air are noted about the region of diverticulitis suggesting sequela of micro perforation.  In comparison to the previous exam, this has increased.  Continued follow-up advised.  2. Hepatic steatosis, correlation with LFTs advised.  3. Additional findings above.

## 2019-02-04 NOTE — PLAN OF CARE
Problem: Adult Inpatient Plan of Care  Goal: Plan of Care Review  Outcome: Ongoing (interventions implemented as appropriate)  Pt alert and oriented medicated for pain management with moderate results continues IV ABX  And fluids tolerating each well surgical incision to abdomen remains intact and approximated ostomy is producing flatulence and has 0 s/s of infection sonu drain is putting out a small amount of drainage

## 2019-02-04 NOTE — ASSESSMENT & PLAN NOTE
43 y.o. male 3 Days Post-Op for Procedure(s) (LRB):  COLECTOMY, SIGMOID, Colostomy (N/A)   POD 3 Johny's procedure. Pain well controlled. Bowel sweat in ostomy. Still requiring IV pain medication. Ambulating frequently.     -advance to fulls.   -continue oral pain meds with IV breakthrough as needed.   -continuemuscle relaxant for back   -adjust abx per ID, thank you for recs.   -oob today  -DVT and GI ppx  -d/c fluids  -daily labs  -awaiting bowel function

## 2019-02-04 NOTE — PROGRESS NOTES
[facility]  Colorectal Surgery  Progress Note    Patient Name: Kyle Abel  MRN: [id number]  Admission Date: 1/28/2019  Hospital Length of Stay: 7 days  Attending Physician: Samir Guidry MD    Subjective:     Interval History: No acute events overnight. Pain well controlled. No nausea or vomiting. Tolerating clears. Ambulating.     Post-Op Info:  Procedure(s) (LRB):  COLECTOMY, SIGMOID, Colostomy (N/A)   3 Days Post-Op      Medications:  Continuous Infusions:  Scheduled Meds:   ciprofloxacin HCl  500 mg Oral Q12H    enoxaparin  40 mg Subcutaneous Daily    fluconazole (DIFLUCAN) IVPB  400 mg Intravenous Q24H    methocarbamol  500 mg Oral QID    metroNIDAZOLE  500 mg Oral Q8H     PRN Meds:   HYDROmorphone    naloxone    ondansetron    oxyCODONE    oxyCODONE    promethazine (PHENERGAN) IVPB        Objective:     Vital Signs (Most Recent):  Temp: 98.3 °F (36.8 °C) (02/04/19 0055)  Pulse: 67 (02/04/19 0055)  Resp: 18 (02/04/19 0055)  BP: 118/70 (02/04/19 0055)  SpO2: 95 % (02/04/19 0400) Vital Signs (24h Range):  Temp:  [97.8 °F (36.6 °C)-99.1 °F (37.3 °C)] 98.3 °F (36.8 °C)  Pulse:  [67-88] 67  Resp:  [16-18] 18  SpO2:  [95 %-97 %] 95 %  BP: (113-130)/(61-84) 118/70     Intake/Output - Last 3 Shifts       02/02 0700 - 02/03 0659 02/03 0700 - 02/04 0659 02/04 0700 - 02/05 0659    P.O. 1300 1640     I.V. (mL/kg) 1200 (9.5) 2537.5 (20.1)     IV Piggyback 1200 2000     Total Intake(mL/kg) 3700 (29.3) 6177.5 (49)     Urine (mL/kg/hr) 800 (0.3) 1000 (0.3)     Emesis/NG output 0      Drains 105 30     Other 0      Stool 0 20     Blood       Total Output 905 1050     Net +2795 +5127.5            Urine Occurrence 5 x 6 x     Stool Occurrence 0 x      Emesis Occurrence 0 x            Physical Exam   Constitutional: He appears well-developed and well-nourished. No distress.   HENT:   Head: Normocephalic and atraumatic.   Eyes: EOM are normal.   Neck: Normal range of motion.   Cardiovascular:  Normal rate.   Pulmonary/Chest: Effort normal.   Abdominal: Soft. He exhibits no distension. There is tenderness (appropriate incisional tenderness).   Midline dressing clean, dry, and intact. Ostomy viable with bowel sweat.    Musculoskeletal: Normal range of motion.   Neurological: He is alert.   Skin: Skin is warm and dry.   Psychiatric: He has a normal mood and affect. His behavior is normal.   Nursing note and vitals reviewed.      Significant Labs:  CBC:   Recent Labs   Lab 02/04/19  0525   WBC 6.12   RBC 3.98*   HGB 11.1*   HCT 35.7*      MCV 90   MCH 27.9   MCHC 31.1*     CMP:   Recent Labs   Lab 01/28/19  1151  02/04/19  0525      < > 100   CALCIUM 9.7   < > 8.2*   ALBUMIN 3.8  --   --    PROT 7.8  --   --       < > 137   K 4.2   < > 3.5   CO2 24   < > 25      < > 106   BUN 14   < > 6   CREATININE 1.0   < > 0.7   ALKPHOS 61  --   --    ALT 55*  --   --    AST 24  --   --    BILITOT 0.5  --   --     < > = values in this interval not displayed.       Significant Diagnostics:  I have reviewed all pertinent imaging results/findings within the past 24 hours.    Assessment/Plan:     * Sigmoid diverticulitis    43 y.o. male 3 Days Post-Op for Procedure(s) (LRB):  COLECTOMY, SIGMOID, Colostomy (N/A)   POD 3 Johny's procedure. Pain well controlled. Bowel sweat in ostomy. Still requiring IV pain medication. Ambulating frequently.     -advance to fulls.   -continue oral pain meds with IV breakthrough as needed.   -continuemuscle relaxant for back   -adjust abx per ID, thank you for recs.   -oob today  -DVT and GI ppx  -d/c fluids  -daily labs  -awaiting bowel function           Luis Cardenas MD  Colorectal Surgery  [facility]

## 2019-02-04 NOTE — PLAN OF CARE
SW following for d/c needs. Pt expected to d/c home with home health.  SW will send a referral when appropriate.           Kurtis Griffith, MSW, LCSW  [facility]  [id number]

## 2019-02-04 NOTE — PROGRESS NOTES
Patient seen for colostomy education. Reviewed pouch care with patient: how often, removing pouch, cleaning peristomal skin, measuring stoma, cutting/applying pouch, and emptying pouch. Stoma red slightly budded, 40mm. Patient reports flatus. Bowel sweat noted. Peristomal skin intact. Slight divet noted beneath stoma. coloplast joss pouch and barrier ring applied. Answered patient's questions. Supplies ordered to bedside. coloplast contacted for samples. Ostomy dept to follow. Educational material at bedside.

## 2019-02-04 NOTE — PT/OT/SLP PROGRESS
"Occupational Therapy  Discharge Note      Patient Name:  Kyle Abel   MRN:  [id number]    Patient ambulating in hallway and requesting RN notify therapy that he is able to ambulate independently. RN approached this OT and OT able to speak with pt regarding current level of function. Pt stated he is independent in room and with all self-care needs. Pt reports that he is able to ambulate, but pain is a limiting factor. Pt stating that therapy "is not a concern at this time" and requesting OT/PT orders be d/c. OT encouraged pt to ambulate 3x/day and sit UIC. Pt educated that therapy is able to be re-consulted at any time should pt's functional status change for any reason. Pt stated his understanding. OT to d/c orders and PT notified of pt request for d/c. No charges filed for this visit.    Rosa Weinstein OT  2/4/2019  "

## 2019-02-05 PROBLEM — D64.9 MILD ANEMIA: Status: RESOLVED | Noted: 2018-06-13 | Resolved: 2019-02-05

## 2019-02-05 PROBLEM — K63.5 COLON POLYPS: Status: RESOLVED | Noted: 2017-01-01 | Resolved: 2019-02-05

## 2019-02-05 PROBLEM — K62.5 RECTAL BLEEDING: Status: RESOLVED | Noted: 2017-09-06 | Resolved: 2019-02-05

## 2019-02-05 PROBLEM — L81.9 HYPOPIGMENTED SKIN LESION: Status: RESOLVED | Noted: 2019-01-03 | Resolved: 2019-02-05

## 2019-02-05 LAB
ANION GAP SERPL CALC-SCNC: 7 MMOL/L
BASOPHILS # BLD AUTO: 0.1 K/UL
BASOPHILS NFR BLD: 1.5 %
BUN SERPL-MCNC: 6 MG/DL
CALCIUM SERPL-MCNC: 8.7 MG/DL
CHLORIDE SERPL-SCNC: 104 MMOL/L
CO2 SERPL-SCNC: 27 MMOL/L
CREAT SERPL-MCNC: 0.8 MG/DL
DIFFERENTIAL METHOD: ABNORMAL
EOSINOPHIL # BLD AUTO: 0.2 K/UL
EOSINOPHIL NFR BLD: 3.2 %
ERYTHROCYTE [DISTWIDTH] IN BLOOD BY AUTOMATED COUNT: 14.6 %
EST. GFR  (AFRICAN AMERICAN): >60 ML/MIN/1.73 M^2
EST. GFR  (NON AFRICAN AMERICAN): >60 ML/MIN/1.73 M^2
GLUCOSE SERPL-MCNC: 90 MG/DL
HCT VFR BLD AUTO: 37.2 %
HGB BLD-MCNC: 11.5 G/DL
IMM GRANULOCYTES # BLD AUTO: 0.3 K/UL
IMM GRANULOCYTES NFR BLD AUTO: 4.5 %
LYMPHOCYTES # BLD AUTO: 1.2 K/UL
LYMPHOCYTES NFR BLD: 17.3 %
MAGNESIUM SERPL-MCNC: 1.9 MG/DL
MCH RBC QN AUTO: 28.1 PG
MCHC RBC AUTO-ENTMCNC: 30.9 G/DL
MCV RBC AUTO: 91 FL
MONOCYTES # BLD AUTO: 0.7 K/UL
MONOCYTES NFR BLD: 10.5 %
NEUTROPHILS # BLD AUTO: 4.2 K/UL
NEUTROPHILS NFR BLD: 63 %
NRBC BLD-RTO: 0 /100 WBC
PHOSPHATE SERPL-MCNC: 3.4 MG/DL
PLATELET # BLD AUTO: 304 K/UL
PMV BLD AUTO: 10.8 FL
POTASSIUM SERPL-SCNC: 4.1 MMOL/L
RBC # BLD AUTO: 4.09 M/UL
SODIUM SERPL-SCNC: 138 MMOL/L
WBC # BLD AUTO: 6.65 K/UL

## 2019-02-05 PROCEDURE — 63600175 PHARM REV CODE 636 W HCPCS: Performed by: STUDENT IN AN ORGANIZED HEALTH CARE EDUCATION/TRAINING PROGRAM

## 2019-02-05 PROCEDURE — 84100 ASSAY OF PHOSPHORUS: CPT

## 2019-02-05 PROCEDURE — 85025 COMPLETE CBC W/AUTO DIFF WBC: CPT

## 2019-02-05 PROCEDURE — 80048 BASIC METABOLIC PNL TOTAL CA: CPT

## 2019-02-05 PROCEDURE — 36415 COLL VENOUS BLD VENIPUNCTURE: CPT

## 2019-02-05 PROCEDURE — 63600175 PHARM REV CODE 636 W HCPCS: Performed by: SURGERY

## 2019-02-05 PROCEDURE — 25000003 PHARM REV CODE 250: Performed by: SURGERY

## 2019-02-05 PROCEDURE — 93010 ELECTROCARDIOGRAM REPORT: CPT | Mod: ,,, | Performed by: INTERNAL MEDICINE

## 2019-02-05 PROCEDURE — 83735 ASSAY OF MAGNESIUM: CPT

## 2019-02-05 PROCEDURE — 20600001 HC STEP DOWN PRIVATE ROOM

## 2019-02-05 PROCEDURE — 93005 ELECTROCARDIOGRAM TRACING: CPT

## 2019-02-05 PROCEDURE — 93010 EKG 12-LEAD: ICD-10-PCS | Mod: ,,, | Performed by: INTERNAL MEDICINE

## 2019-02-05 PROCEDURE — 25000003 PHARM REV CODE 250: Performed by: STUDENT IN AN ORGANIZED HEALTH CARE EDUCATION/TRAINING PROGRAM

## 2019-02-05 RX ORDER — METRONIDAZOLE 500 MG/1
500 TABLET ORAL EVERY 8 HOURS
Status: DISCONTINUED | OUTPATIENT
Start: 2019-02-05 | End: 2019-02-06 | Stop reason: HOSPADM

## 2019-02-05 RX ORDER — KETOROLAC TROMETHAMINE 30 MG/ML
30 INJECTION, SOLUTION INTRAMUSCULAR; INTRAVENOUS EVERY 6 HOURS
Status: DISCONTINUED | OUTPATIENT
Start: 2019-02-05 | End: 2019-02-06 | Stop reason: HOSPADM

## 2019-02-05 RX ORDER — CIPROFLOXACIN 500 MG/1
500 TABLET ORAL EVERY 12 HOURS
Status: DISCONTINUED | OUTPATIENT
Start: 2019-02-05 | End: 2019-02-06 | Stop reason: HOSPADM

## 2019-02-05 RX ORDER — ACETAMINOPHEN 500 MG
1000 TABLET ORAL EVERY 8 HOURS
Status: DISCONTINUED | OUTPATIENT
Start: 2019-02-05 | End: 2019-02-06 | Stop reason: HOSPADM

## 2019-02-05 RX ORDER — FLUCONAZOLE 200 MG/1
400 TABLET ORAL DAILY
Status: DISCONTINUED | OUTPATIENT
Start: 2019-02-05 | End: 2019-02-06 | Stop reason: HOSPADM

## 2019-02-05 RX ADMIN — ENOXAPARIN SODIUM 40 MG: 100 INJECTION SUBCUTANEOUS at 09:02

## 2019-02-05 RX ADMIN — METRONIDAZOLE 500 MG: 500 TABLET ORAL at 06:02

## 2019-02-05 RX ADMIN — METRONIDAZOLE 500 MG: 500 TABLET ORAL at 01:02

## 2019-02-05 RX ADMIN — CIPROFLOXACIN HYDROCHLORIDE 500 MG: 500 TABLET, FILM COATED ORAL at 09:02

## 2019-02-05 RX ADMIN — OXYCODONE HYDROCHLORIDE 10 MG: 10 TABLET ORAL at 01:02

## 2019-02-05 RX ADMIN — METRONIDAZOLE 500 MG: 500 TABLET ORAL at 10:02

## 2019-02-05 RX ADMIN — FLUCONAZOLE 400 MG: 200 TABLET ORAL at 09:02

## 2019-02-05 RX ADMIN — ACETAMINOPHEN 1000 MG: 500 TABLET ORAL at 01:02

## 2019-02-05 RX ADMIN — METHOCARBAMOL TABLETS 500 MG: 500 TABLET, COATED ORAL at 09:02

## 2019-02-05 RX ADMIN — HYDROMORPHONE HYDROCHLORIDE 1 MG: 1 INJECTION, SOLUTION INTRAMUSCULAR; INTRAVENOUS; SUBCUTANEOUS at 08:02

## 2019-02-05 RX ADMIN — OXYCODONE HYDROCHLORIDE 10 MG: 10 TABLET ORAL at 06:02

## 2019-02-05 RX ADMIN — HYDROMORPHONE HYDROCHLORIDE 1 MG: 1 INJECTION, SOLUTION INTRAMUSCULAR; INTRAVENOUS; SUBCUTANEOUS at 11:02

## 2019-02-05 RX ADMIN — METHOCARBAMOL TABLETS 500 MG: 500 TABLET, COATED ORAL at 01:02

## 2019-02-05 RX ADMIN — OXYCODONE HYDROCHLORIDE 10 MG: 10 TABLET ORAL at 09:02

## 2019-02-05 RX ADMIN — METHOCARBAMOL TABLETS 500 MG: 500 TABLET, COATED ORAL at 05:02

## 2019-02-05 RX ADMIN — HYDROMORPHONE HYDROCHLORIDE 1 MG: 1 INJECTION, SOLUTION INTRAMUSCULAR; INTRAVENOUS; SUBCUTANEOUS at 06:02

## 2019-02-05 RX ADMIN — KETOROLAC TROMETHAMINE 30 MG: 30 INJECTION, SOLUTION INTRAMUSCULAR at 05:02

## 2019-02-05 RX ADMIN — KETOROLAC TROMETHAMINE 30 MG: 30 INJECTION, SOLUTION INTRAMUSCULAR at 01:02

## 2019-02-05 NOTE — SUBJECTIVE & OBJECTIVE
Subjective:     Interval History: Doing very well, ambulating independently, up in chair yesterday. Tolerating diet.     Post-Op Info:  Procedure(s) (LRB):  COLECTOMY, SIGMOID, Colostomy (N/A)   4 Days Post-Op      Medications:  Continuous Infusions:  Scheduled Meds:   acetaminophen  1,000 mg Oral Q8H    ciprofloxacin HCl  500 mg Oral Q12H    enoxaparin  40 mg Subcutaneous Daily    fluconazole  400 mg Oral Daily    ketorolac  30 mg Intravenous Q6H    methocarbamol  500 mg Oral QID    metroNIDAZOLE  500 mg Oral Q8H     PRN Meds:   HYDROmorphone    naloxone    ondansetron    oxyCODONE    oxyCODONE    promethazine (PHENERGAN) IVPB        Objective:     Vital Signs (Most Recent):  Temp: 98.4 °F (36.9 °C) (02/05/19 0422)  Pulse: 86 (02/05/19 0422)  Resp: 18 (02/05/19 0422)  BP: 126/79 (02/05/19 0422)  SpO2: (!) 92 % (02/05/19 0422) Vital Signs (24h Range):  Temp:  [98.4 °F (36.9 °C)-99.4 °F (37.4 °C)] 98.4 °F (36.9 °C)  Pulse:  [72-86] 86  Resp:  [16-20] 18  SpO2:  [92 %-98 %] 92 %  BP: (117-130)/(69-81) 126/79     Intake/Output - Last 3 Shifts       02/03 0700 - 02/04 0659 02/04 0700 - 02/05 0659 02/05 0700 - 02/06 0659    P.O. 1640 800     I.V. (mL/kg) 2537.5 (20.1)      IV Piggyback 2000      Total Intake(mL/kg) 6177.5 (49) 800 (6.3)     Urine (mL/kg/hr) 1000 (0.3) 0 (0)     Emesis/NG output       Drains 30 15     Other       Stool 20 60     Total Output 1050 75     Net +5127.5 +725            Urine Occurrence 6 x 8 x           Physical Exam   Constitutional: He appears well-developed and well-nourished. No distress.   Cardiovascular: Normal rate.   Pulmonary/Chest: Effort normal.   Abdominal: Soft. He exhibits no distension. There is tenderness (appropriate incisional tenderness).   Incision clean, dry, and intact. Ostomy viable with +output   Musculoskeletal: Normal range of motion.   Neurological: He is alert.   Skin: Skin is warm and dry.   Nursing note and vitals reviewed.      Significant  Labs:  CBC:   Recent Labs   Lab 02/05/19 0512   WBC 6.65   RBC 4.09*   HGB 11.5*   HCT 37.2*      MCV 91   MCH 28.1   MCHC 30.9*     CMP:   Recent Labs   Lab 02/05/19 0512   GLU 90   CALCIUM 8.7      K 4.1   CO2 27      BUN 6   CREATININE 0.8       Significant Diagnostics:  I have reviewed all pertinent imaging results/findings within the past 24 hours.

## 2019-02-05 NOTE — ASSESSMENT & PLAN NOTE
43 y.o. male 4 Days Post-Op for Procedure(s) (LRB):  COLECTOMY, SIGMOID, Colostomy (N/A)    Abx x14 full days, stop date 12/12  Low res diet, ambulate  Add adjuvant pain meds to wean dilaudid

## 2019-02-05 NOTE — PROGRESS NOTES
[facility]  Colorectal Surgery  Progress Note    Patient Name: Kyle Abel  MRN: [id number]  Admission Date: 1/28/2019  Hospital Length of Stay: 8 days  Attending Physician: Samir Guidry MD    Subjective:     Interval History: Doing very well, ambulating independently, up in chair yesterday. Tolerating diet.     Post-Op Info:  Procedure(s) (LRB):  COLECTOMY, SIGMOID, Colostomy (N/A)   4 Days Post-Op      Medications:  Continuous Infusions:  Scheduled Meds:   acetaminophen  1,000 mg Oral Q8H    ciprofloxacin HCl  500 mg Oral Q12H    enoxaparin  40 mg Subcutaneous Daily    fluconazole  400 mg Oral Daily    ketorolac  30 mg Intravenous Q6H    methocarbamol  500 mg Oral QID    metroNIDAZOLE  500 mg Oral Q8H     PRN Meds:   HYDROmorphone    naloxone    ondansetron    oxyCODONE    oxyCODONE    promethazine (PHENERGAN) IVPB        Objective:     Vital Signs (Most Recent):  Temp: 98.4 °F (36.9 °C) (02/05/19 0422)  Pulse: 86 (02/05/19 0422)  Resp: 18 (02/05/19 0422)  BP: 126/79 (02/05/19 0422)  SpO2: (!) 92 % (02/05/19 0422) Vital Signs (24h Range):  Temp:  [98.4 °F (36.9 °C)-99.4 °F (37.4 °C)] 98.4 °F (36.9 °C)  Pulse:  [72-86] 86  Resp:  [16-20] 18  SpO2:  [92 %-98 %] 92 %  BP: (117-130)/(69-81) 126/79     Intake/Output - Last 3 Shifts       02/03 0700 - 02/04 0659 02/04 0700 - 02/05 0659 02/05 0700 - 02/06 0659    P.O. 1640 800     I.V. (mL/kg) 2537.5 (20.1)      IV Piggyback 2000      Total Intake(mL/kg) 6177.5 (49) 800 (6.3)     Urine (mL/kg/hr) 1000 (0.3) 0 (0)     Emesis/NG output       Drains 30 15     Other       Stool 20 60     Total Output 1050 75     Net +5127.5 +725            Urine Occurrence 6 x 8 x           Physical Exam   Constitutional: He appears well-developed and well-nourished. No distress.   Cardiovascular: Normal rate.   Pulmonary/Chest: Effort normal.   Abdominal: Soft. He exhibits no distension. There is tenderness (appropriate incisional tenderness).    Incision clean, dry, and intact. Ostomy viable with +output   Musculoskeletal: Normal range of motion.   Neurological: He is alert.   Skin: Skin is warm and dry.   Nursing note and vitals reviewed.      Significant Labs:  CBC:   Recent Labs   Lab 02/05/19 0512   WBC 6.65   RBC 4.09*   HGB 11.5*   HCT 37.2*      MCV 91   MCH 28.1   MCHC 30.9*     CMP:   Recent Labs   Lab 02/05/19 0512   GLU 90   CALCIUM 8.7      K 4.1   CO2 27      BUN 6   CREATININE 0.8       Significant Diagnostics:  I have reviewed all pertinent imaging results/findings within the past 24 hours.    Assessment/Plan:     * Sigmoid diverticulitis    43 y.o. male 4 Days Post-Op for Procedure(s) (LRB):  COLECTOMY, SIGMOID, Colostomy (N/A)    Abx x14 full days, stop date 12/12  Low res diet, ambulate  Add adjuvant pain meds to wean dilaudid             Denise Rose MD  Colorectal Surgery  [facility]

## 2019-02-05 NOTE — PLAN OF CARE
CM met with patient to complete discharge reassessment and planning. Patient reports he would like to receive Home Healthcare for wound ostomy care. Patient has transportation home and good family support. Patient request CM to contact CRS team in regards to Short Term Disability paperwork removed from pt's room per MD. CM informed Dr Cardenas of pt's request for return of Short Term Disability paperwork. MD reports he is not aware of the paperwork and who has the patient's forms. Dr Cardenas will attempt to locate the patient's forms.   02/05/19 1320   Discharge Reassessment   Assessment Type Discharge Planning Reassessment   Provided patient/caregiver education on the expected discharge date and the discharge plan Yes   Do you have any problems affording any of your prescribed medications? TBD   Discharge Plan A Home;Home with family;Home Health   Discharge Plan B Home;Home with family   DME Needed Upon Discharge  colostomy/ostomy supplies   Anticipated Discharge Disposition Home-Health   Can the patient answer the patient profile reliably? Yes, cognitively intact   How does the patient rate their overall health at the present time? Fair   Describe the patient's ability to walk at the present time. Minor restrictions or changes   How often would a person be available to care for the patient? Often   Number of comorbid conditions (as recorded on the chart) None   During the past month, has the patient often been bothered by feeling down, depressed or hopeless? No   During the past month, has the patient often been bothered by little interest or pleasure in doing things? No

## 2019-02-05 NOTE — PLAN OF CARE
Problem: Adult Inpatient Plan of Care  Goal: Plan of Care Review  Outcome: Revised  POC reviewed with patient who verbalized understanding.  VSS stable on room air.  AAOX4.  Patient sat in chair most of the day and ambulated between bed, chair and bathroom.      Midline incision with staples RON, colostomy, and JOSE drain remained clean dry and intact.      Patient transitioned from IV infusions to oral fluids and medications.  Patient tolerating liquids diet, denies nausea.  Abdominal pain control with PRN medications per MAR.  Patient denies chest pain or SOB.  No acute events and no distress noted.  Bed in lowest position, call light within reach, frequent rounds made for patient safety.

## 2019-02-05 NOTE — PLAN OF CARE
Problem: Adult Inpatient Plan of Care  Goal: Plan of Care Review  Outcome: Ongoing (interventions implemented as appropriate)  Pt alert and oriented resp even and unlabored medicated x 1 for pain management with effective results The pt is ambulating independently and has a positive attitude he continues oral ABX therapy without adverse effects all surgical incisions remain free from s/s of infection

## 2019-02-05 NOTE — PROGRESS NOTES
Patient seen for follow up colostomy education. Patient denied any questions. Patient having small amount of output. Patient states has been emptying pouch on his own. No need noted at this time. coloplast previously contacted for samples. Extra supplies ordered to room. Ostomy dept to follow as needed.

## 2019-02-06 ENCOUNTER — TELEPHONE (OUTPATIENT)
Dept: SURGERY | Facility: CLINIC | Age: 44
End: 2019-02-06

## 2019-02-06 ENCOUNTER — TELEPHONE (OUTPATIENT)
Dept: FAMILY MEDICINE | Facility: CLINIC | Age: 44
End: 2019-02-06

## 2019-02-06 VITALS
DIASTOLIC BLOOD PRESSURE: 83 MMHG | HEIGHT: 70 IN | BODY MASS INDEX: 39.8 KG/M2 | SYSTOLIC BLOOD PRESSURE: 124 MMHG | RESPIRATION RATE: 20 BRPM | OXYGEN SATURATION: 97 % | TEMPERATURE: 98 F | WEIGHT: 278 LBS | HEART RATE: 78 BPM

## 2019-02-06 LAB
ANION GAP SERPL CALC-SCNC: 5 MMOL/L
ANISOCYTOSIS BLD QL SMEAR: SLIGHT
BASOPHILS # BLD AUTO: ABNORMAL K/UL
BASOPHILS NFR BLD: 0 %
BUN SERPL-MCNC: 8 MG/DL
CALCIUM SERPL-MCNC: 9 MG/DL
CHLORIDE SERPL-SCNC: 105 MMOL/L
CO2 SERPL-SCNC: 28 MMOL/L
CREAT SERPL-MCNC: 0.8 MG/DL
DIFFERENTIAL METHOD: ABNORMAL
EOSINOPHIL # BLD AUTO: ABNORMAL K/UL
EOSINOPHIL NFR BLD: 3 %
ERYTHROCYTE [DISTWIDTH] IN BLOOD BY AUTOMATED COUNT: 14.5 %
EST. GFR  (AFRICAN AMERICAN): >60 ML/MIN/1.73 M^2
EST. GFR  (NON AFRICAN AMERICAN): >60 ML/MIN/1.73 M^2
GLUCOSE SERPL-MCNC: 94 MG/DL
HCT VFR BLD AUTO: 36.4 %
HGB BLD-MCNC: 11.5 G/DL
HYPOCHROMIA BLD QL SMEAR: ABNORMAL
IMM GRANULOCYTES # BLD AUTO: ABNORMAL K/UL
IMM GRANULOCYTES NFR BLD AUTO: ABNORMAL %
LYMPHOCYTES # BLD AUTO: ABNORMAL K/UL
LYMPHOCYTES NFR BLD: 7 %
MAGNESIUM SERPL-MCNC: 2.1 MG/DL
MCH RBC QN AUTO: 28.1 PG
MCHC RBC AUTO-ENTMCNC: 31.6 G/DL
MCV RBC AUTO: 89 FL
MONOCYTES # BLD AUTO: ABNORMAL K/UL
MONOCYTES NFR BLD: 6 %
MYELOCYTES NFR BLD MANUAL: 1 %
NEUTROPHILS NFR BLD: 83 %
NRBC BLD-RTO: 0 /100 WBC
OVALOCYTES BLD QL SMEAR: ABNORMAL
PHOSPHATE SERPL-MCNC: 3.9 MG/DL
PLATELET # BLD AUTO: 278 K/UL
PMV BLD AUTO: 10.6 FL
POIKILOCYTOSIS BLD QL SMEAR: SLIGHT
POLYCHROMASIA BLD QL SMEAR: ABNORMAL
POTASSIUM SERPL-SCNC: 3.8 MMOL/L
RBC # BLD AUTO: 4.09 M/UL
SODIUM SERPL-SCNC: 138 MMOL/L
WBC # BLD AUTO: 5.17 K/UL

## 2019-02-06 PROCEDURE — 80048 BASIC METABOLIC PNL TOTAL CA: CPT

## 2019-02-06 PROCEDURE — 85007 BL SMEAR W/DIFF WBC COUNT: CPT

## 2019-02-06 PROCEDURE — 85027 COMPLETE CBC AUTOMATED: CPT

## 2019-02-06 PROCEDURE — 25000003 PHARM REV CODE 250: Performed by: STUDENT IN AN ORGANIZED HEALTH CARE EDUCATION/TRAINING PROGRAM

## 2019-02-06 PROCEDURE — 84100 ASSAY OF PHOSPHORUS: CPT

## 2019-02-06 PROCEDURE — 36415 COLL VENOUS BLD VENIPUNCTURE: CPT

## 2019-02-06 PROCEDURE — 63600175 PHARM REV CODE 636 W HCPCS: Performed by: SURGERY

## 2019-02-06 PROCEDURE — 83735 ASSAY OF MAGNESIUM: CPT

## 2019-02-06 PROCEDURE — 63600175 PHARM REV CODE 636 W HCPCS: Performed by: STUDENT IN AN ORGANIZED HEALTH CARE EDUCATION/TRAINING PROGRAM

## 2019-02-06 PROCEDURE — 25000003 PHARM REV CODE 250: Performed by: SURGERY

## 2019-02-06 RX ORDER — FLUCONAZOLE 200 MG/1
400 TABLET ORAL DAILY
Qty: 12 TABLET | Refills: 0 | Status: SHIPPED | OUTPATIENT
Start: 2019-02-06 | End: 2019-02-12

## 2019-02-06 RX ORDER — OXYCODONE HYDROCHLORIDE 5 MG/1
5 TABLET ORAL EVERY 4 HOURS PRN
Qty: 31 TABLET | Refills: 0 | Status: SHIPPED | OUTPATIENT
Start: 2019-02-06 | End: 2019-03-06

## 2019-02-06 RX ORDER — CIPROFLOXACIN 500 MG/1
500 TABLET ORAL EVERY 12 HOURS
Qty: 12 TABLET | Refills: 0 | Status: SHIPPED | OUTPATIENT
Start: 2019-02-06 | End: 2019-02-12

## 2019-02-06 RX ORDER — METRONIDAZOLE 500 MG/1
500 TABLET ORAL EVERY 8 HOURS
Qty: 18 TABLET | Refills: 0 | Status: SHIPPED | OUTPATIENT
Start: 2019-02-06 | End: 2019-02-12

## 2019-02-06 RX ORDER — OXYCODONE HYDROCHLORIDE 5 MG/1
5 TABLET ORAL EVERY 4 HOURS PRN
Qty: 31 TABLET | Refills: 0 | Status: SHIPPED | OUTPATIENT
Start: 2019-02-06 | End: 2019-02-06

## 2019-02-06 RX ADMIN — KETOROLAC TROMETHAMINE 30 MG: 30 INJECTION, SOLUTION INTRAMUSCULAR at 12:02

## 2019-02-06 RX ADMIN — METHOCARBAMOL TABLETS 500 MG: 500 TABLET, COATED ORAL at 01:02

## 2019-02-06 RX ADMIN — OXYCODONE HYDROCHLORIDE 5 MG: 5 TABLET ORAL at 02:02

## 2019-02-06 RX ADMIN — CIPROFLOXACIN HYDROCHLORIDE 500 MG: 500 TABLET, FILM COATED ORAL at 08:02

## 2019-02-06 RX ADMIN — METRONIDAZOLE 500 MG: 500 TABLET ORAL at 05:02

## 2019-02-06 RX ADMIN — ACETAMINOPHEN 1000 MG: 500 TABLET ORAL at 05:02

## 2019-02-06 RX ADMIN — METHOCARBAMOL TABLETS 500 MG: 500 TABLET, COATED ORAL at 08:02

## 2019-02-06 RX ADMIN — FLUCONAZOLE 400 MG: 200 TABLET ORAL at 08:02

## 2019-02-06 RX ADMIN — OXYCODONE HYDROCHLORIDE 10 MG: 10 TABLET ORAL at 08:02

## 2019-02-06 RX ADMIN — KETOROLAC TROMETHAMINE 30 MG: 30 INJECTION, SOLUTION INTRAMUSCULAR at 05:02

## 2019-02-06 RX ADMIN — HYDROMORPHONE HYDROCHLORIDE 1 MG: 1 INJECTION, SOLUTION INTRAMUSCULAR; INTRAVENOUS; SUBCUTANEOUS at 11:02

## 2019-02-06 NOTE — PLAN OF CARE
SW spoke with pt anout d/c plan.  SW discuss HH recommendation with pt. Pt has no HH preference.  SW sent a referral to [facility].  SW will f/u on this referral.     Pt accepted by [facility].        02/06/19 1051   Post-Acute Status   Post-Acute Authorization Home Health/Hospice   Home Health/Hospice Status Referrals Sent       Kurtis Griffith, MSW, [facility]  [facility]  [id number]

## 2019-02-06 NOTE — TELEPHONE ENCOUNTER
----- Message from Connie Cagle sent at 2/6/2019 12:39 PM CST -----  Contact: [facility] Sabina:[phone]  .Needs Advice    Reason for call:Sabina with [facility] called and states she would like to speak with the nurse. She states she has some questions for the nurse.        Communication Preference:[facility] [place]:[phone]    Additional Information:

## 2019-02-06 NOTE — PROGRESS NOTES
Reviewed colostomy care with patient.  Patient changed pouch per self without issues. Supplies at bedside for home.  Stoma is pink/moist slightly above skin level, 45mm.   Contact numbers given to patient.

## 2019-02-06 NOTE — PROGRESS NOTES
[facility]  Colorectal Surgery  Progress Note    Patient Name: Kyle Abel  MRN: [id number]  Admission Date: 1/28/2019  Hospital Length of Stay: 9 days  Attending Physician: Samir Guidry MD    Subjective:     Interval History: Doing very well. Confident with ostomy. Tolerating diet. Good output. Ambulating. Minimal narcotic use.     Post-Op Info:  Procedure(s) (LRB):  COLECTOMY, SIGMOID, Colostomy (N/A)   5 Days Post-Op      Medications:  Continuous Infusions:  Scheduled Meds:   acetaminophen  1,000 mg Oral Q8H    ciprofloxacin HCl  500 mg Oral Q12H    enoxaparin  40 mg Subcutaneous Daily    fluconazole  400 mg Oral Daily    ketorolac  30 mg Intravenous Q6H    methocarbamol  500 mg Oral QID    metroNIDAZOLE  500 mg Oral Q8H     PRN Meds:   HYDROmorphone    naloxone    ondansetron    oxyCODONE    oxyCODONE    promethazine (PHENERGAN) IVPB        Objective:     Vital Signs (Most Recent):  Temp: 97.4 °F (36.3 °C) (02/06/19 0456)  Pulse: 77 (02/06/19 0456)  Resp: 16 (02/06/19 0456)  BP: 125/74 (02/06/19 0456)  SpO2: 96 % (02/06/19 0456) Vital Signs (24h Range):  Temp:  [97.4 °F (36.3 °C)-98.3 °F (36.8 °C)] 97.4 °F (36.3 °C)  Pulse:  [74-77] 77  Resp:  [16-20] 16  SpO2:  [96 %-97 %] 96 %  BP: (119-125)/(74-80) 125/74     Intake/Output - Last 3 Shifts       02/04 0700 - 02/05 0659 02/05 0700 - 02/06 0659 02/06 0700 - 02/07 0659    P.O. 800      I.V. (mL/kg)       IV Piggyback       Total Intake(mL/kg) 800 (6.3)      Urine (mL/kg/hr) 0 (0) 0 (0)     Emesis/NG output  75     Drains 15      Stool 60 100     Total Output 75 175     Net +725 -175            Urine Occurrence 8 x 3 x     Stool Occurrence  0 x           Physical Exam   Constitutional: He appears well-developed and well-nourished. No distress.   Cardiovascular: Normal rate.   Pulmonary/Chest: Effort normal.   Abdominal: Soft. He exhibits no distension. There is tenderness (appropriate incisional tenderness).   Incision  clean, dry, and intact. Ostomy viable with +output   Musculoskeletal: Normal range of motion.   Neurological: He is alert.   Skin: Skin is warm and dry.   Nursing note and vitals reviewed.      Significant Labs:  CBC:   Recent Labs   Lab 02/06/19  0521   WBC 5.17   RBC 4.09*   HGB 11.5*   HCT 36.4*      MCV 89   MCH 28.1   MCHC 31.6*     CMP:   Recent Labs   Lab 02/06/19  0520   GLU 94   CALCIUM 9.0      K 3.8   CO2 28      BUN 8   CREATININE 0.8       Significant Diagnostics:  I have reviewed all pertinent imaging results/findings within the past 24 hours.    Assessment/Plan:     * Sigmoid diverticulitis    43 y.o. male 5 Days Post-Op for Procedure(s) (LRB):  COLECTOMY, SIGMOID, Colostomy (N/A)    Abx x14 full days, stop date 2/12  Low res diet, ambulate  Discharge today             Luis Cardenas MD  Colorectal Surgery  [facility]

## 2019-02-06 NOTE — SUBJECTIVE & OBJECTIVE
Subjective:     Interval History: Doing very well. Confident with ostomy. Tolerating diet. Good output. Ambulating. Minimal narcotic use.     Post-Op Info:  Procedure(s) (LRB):  COLECTOMY, SIGMOID, Colostomy (N/A)   5 Days Post-Op      Medications:  Continuous Infusions:  Scheduled Meds:   acetaminophen  1,000 mg Oral Q8H    ciprofloxacin HCl  500 mg Oral Q12H    enoxaparin  40 mg Subcutaneous Daily    fluconazole  400 mg Oral Daily    ketorolac  30 mg Intravenous Q6H    methocarbamol  500 mg Oral QID    metroNIDAZOLE  500 mg Oral Q8H     PRN Meds:   HYDROmorphone    naloxone    ondansetron    oxyCODONE    oxyCODONE    promethazine (PHENERGAN) IVPB        Objective:     Vital Signs (Most Recent):  Temp: 97.4 °F (36.3 °C) (02/06/19 0456)  Pulse: 77 (02/06/19 0456)  Resp: 16 (02/06/19 0456)  BP: 125/74 (02/06/19 0456)  SpO2: 96 % (02/06/19 0456) Vital Signs (24h Range):  Temp:  [97.4 °F (36.3 °C)-98.3 °F (36.8 °C)] 97.4 °F (36.3 °C)  Pulse:  [74-77] 77  Resp:  [16-20] 16  SpO2:  [96 %-97 %] 96 %  BP: (119-125)/(74-80) 125/74     Intake/Output - Last 3 Shifts       02/04 0700 - 02/05 0659 02/05 0700 - 02/06 0659 02/06 0700 - 02/07 0659    P.O. 800      I.V. (mL/kg)       IV Piggyback       Total Intake(mL/kg) 800 (6.3)      Urine (mL/kg/hr) 0 (0) 0 (0)     Emesis/NG output  75     Drains 15      Stool 60 100     Total Output 75 175     Net +725 -175            Urine Occurrence 8 x 3 x     Stool Occurrence  0 x           Physical Exam   Constitutional: He appears well-developed and well-nourished. No distress.   Cardiovascular: Normal rate.   Pulmonary/Chest: Effort normal.   Abdominal: Soft. He exhibits no distension. There is tenderness (appropriate incisional tenderness).   Incision clean, dry, and intact. Ostomy viable with +output   Musculoskeletal: Normal range of motion.   Neurological: He is alert.   Skin: Skin is warm and dry.   Nursing note and vitals reviewed.      Significant Labs:  CBC:    Recent Labs   Lab 02/06/19  0521   WBC 5.17   RBC 4.09*   HGB 11.5*   HCT 36.4*      MCV 89   MCH 28.1   MCHC 31.6*     CMP:   Recent Labs   Lab 02/06/19  0520   GLU 94   CALCIUM 9.0      K 3.8   CO2 28      BUN 8   CREATININE 0.8       Significant Diagnostics:  I have reviewed all pertinent imaging results/findings within the past 24 hours.

## 2019-02-06 NOTE — PLAN OF CARE
CM verified with [employer] Disability Rep, pt's Disability Forms from employer were received via fax from [employer], [employer]. Malena states it will take approx. 7 days for completion of forms and Disability office will fax forms to pt's employer as directed on form request. CM informed patient forms were received to Disability office and expectation of completion of forms.

## 2019-02-06 NOTE — DISCHARGE SUMMARY
[facility]  Colorectal Surgery  Discharge Summary      Patient Name: Kyle Abel  MRN: [id number]  Admission Date: 1/28/2019  Hospital Length of Stay: 9 days  Discharge Date and Time:  02/06/2019 8:31 AM  Attending Physician: Samir Guidry MD   Discharging Provider: Luis Cardenas MD  Primary Care Provider: Gilma Cuba NP     HPI:  43 year old male, hx HTN presented to [place] ER 1/2019 with abd pain, ct proven sigmoid diverticulitis, treated as outpt with cipro/flagy for one week.  Per pt at first he felt better but then yesterday and today his abd pain increased, had several small stools and come to ER due to abd pain. Denies fever at home, no nausea  CT 1/28/19:     In this patient with known sigmoid diverticulitis.  There is increased inflammation in the region, with an increase in relatively disorganized fluid.  There is 1 small focus of possible early fluid organization/abscess measuring approximately 2.3 x 1.1 cm.  No definite air within the fluid collection.  Scattered foci of air are noted about the region of diverticulitis suggesting sequela of micro perforation.  In comparison to the previous exam, this has increased.  Continued follow-up advised.  In Er he has required several doses of IV dilaudid for abd pain  Before last week this is only episode of diverticulitis  Colonoscopy 2017 8mm ascending adenoma polyp removed and 4mm hyperplastic polyp removed from sigmoid, internal hemorrhoids  Had RBL done 1/15/19 by Dr. Lawrence  No hx of colon or rectal surgery  Family hx (father) of UC     Procedure(s) (LRB):  COLECTOMY, SIGMOID, Colostomy (N/A)     Hospital Course:  Patient was admitted for diverticulitis not improved with medical management. Underwent yasmin's procedure which he tolerated very well. Patient was observed in the hospital post operatively whereby he progressed normally. After the first few post operative days he was starting to tolerate clears and was ultimately  advanced to a low residue diet which he did very well with. Ostomy function was appropriate after the first few post op days as well with green stool in his bag. He was taught ostomy care which he felt comfortable with. Patient was discharged on a course of abx given his disease and close follow up was arranged.     Consults (From admission, onward)        Status Ordering Provider     Inpatient consult to Infectious Diseases  Once     Provider:  (Not yet assigned)    Completed RAAD ADAMES          Significant Diagnostic Studies: Labs:   CMP   Recent Labs   Lab 02/05/19  0512 02/06/19  0520    138   K 4.1 3.8    105   CO2 27 28   GLU 90 94   BUN 6 8   CREATININE 0.8 0.8   CALCIUM 8.7 9.0   ANIONGAP 7* 5*   ESTGFRAFRICA >60.0 >60.0   EGFRNONAA >60.0 >60.0    and CBC   Recent Labs   Lab 02/05/19 0512 02/06/19  0521   WBC 6.65 5.17   HGB 11.5* 11.5*   HCT 37.2* 36.4*    278       Pending Diagnostic Studies:     None        Final Active Diagnoses:    Diagnosis Date Noted POA    PRINCIPAL PROBLEM:  Sigmoid diverticulitis [K57.32] 01/28/2019 Yes      Problems Resolved During this Admission:      Discharged Condition: good    Disposition: Home or Self Care    Follow Up:  Follow-up Information     Samir Guidry MD In 2 weeks.    Specialty:  Colon and Rectal Surgery  Why:  For wound re-check  Contact information:  [address] [facility] [address]  [phone]                 Patient Instructions:      Diet Adult Regular     Notify your health care provider if you experience any of the following:  temperature >100.4     Notify your health care provider if you experience any of the following:  persistent nausea and vomiting or diarrhea     Notify your health care provider if you experience any of the following:  severe uncontrolled pain     Notify your health care provider if you experience any of the following:  redness, tenderness, or signs of infection (pain, swelling, redness, odor or  green/yellow discharge around incision site)     Notify your health care provider if you experience any of the following:  difficulty breathing or increased cough     Notify your health care provider if you experience any of the following:  severe persistent headache     Notify your health care provider if you experience any of the following:  worsening rash     Notify your health care provider if you experience any of the following:  persistent dizziness, light-headedness, or visual disturbances     Notify your health care provider if you experience any of the following:  increased confusion or weakness     No dressing needed     Activity as tolerated   Order Comments: No lifting greater than 10 pounds for 6 weeks from day of surgery.  No pushing/pulling such as vacuuming or raking.  Monitor ostomy output. If more than 1 liter a day, please call the office.   No driving while on narcotics and until you can react quickly without pain.     Shower on day dressing removed (No bath)   Order Comments: You can shower when you get home. No baths for 2 weeks.     Medications:  Reconciled Home Medications:      Medication List      START taking these medications    fluconazole 200 MG Tab  Commonly known as:  DIFLUCAN  Take 2 tablets (400 mg total) by mouth once daily. for 6 days     oxyCODONE 5 MG immediate release tablet  Commonly known as:  ROXICODONE  Take 1 tablet (5 mg total) by mouth every 4 (four) hours as needed.        CHANGE how you take these medications    metroNIDAZOLE 500 MG tablet  Commonly known as:  FLAGYL  Take 1 tablet (500 mg total) by mouth every 8 (eight) hours. for 6 days  What changed:  when to take this        CONTINUE taking these medications    ciprofloxacin HCl 500 MG tablet  Commonly known as:  CIPRO  Take 1 tablet (500 mg total) by mouth every 12 (twelve) hours. for 6 days            Luis Cardenas MD  Colorectal Surgery  [facility]

## 2019-02-06 NOTE — PLAN OF CARE
Problem: Pain Acute  Goal: Optimal Pain Control  Outcome: Ongoing (interventions implemented as appropriate)  Patient in bed watching TV, complaining of pain in lower abdomen. Patient complaining about colostomy spilling on his clothing. Assured him that we will get necessary wipes to help with maintaining his colostomy. Patient very anxious about ostomy, but is really trying to adjust. Patient presently has saline sang to lright forearm.  Tolerated pm medications well, requested not to be disturbed until 0400. Patient uses CPAP for sleep at hs. Continues to have small amount of drainage from JOSE drain at this time. Medicated with Dilaudid 1 mg IV per patients request with hs meds to help him sleep. Patient also awake x Toradol 30 mg iv at midnight. Patients' pain is under control at this time. Call light in reach, armband on siderails x 3. Continue Plan of Care.

## 2019-02-06 NOTE — TELEPHONE ENCOUNTER
Spoke with Gisella at home health agency - advised Gisella that patient was admitted for colorectal problem with recent colorectal surgery and home health orders should come from the colorectal specialist.  Gisella verbalizes understanding.

## 2019-02-06 NOTE — PLAN OF CARE
[facility]    HOME HEALTH ORDERS  FACE TO FACE ENCOUNTER    Patient Name: Kyle Abel  YOB: 1975    PCP: Gilma Cuba NP   PCP Address: [address] [place] [address] [address] / [place] [address]  PCP Phone Number: [phone]  PCP Fax: [phone]    Encounter Date: 02/06/2019    Admit to Home Health    Diagnoses:  Active Hospital Problems    Diagnosis  POA    *Sigmoid diverticulitis [K57.32]  Yes      Resolved Hospital Problems   No resolved problems to display.       No future appointments.  Follow-up Information     Samir Guidry MD In 2 weeks.    Specialty:  Colon and Rectal Surgery  Why:  For wound re-check  Contact information:  [facility]  [facility] [address] [phone]                     I have seen and examined this patient face to face today. My clinical findings that support the need for the home health skilled services and home bound status are the following:  Medical restrictions requiring assistance of another human to leave home due to  Wound care needs.    Allergies:  Review of patient's allergies indicates:   Allergen Reactions    Penicillins Anaphylaxis and Swelling     As a child age 11       Diet: regular diet    Activities: no driving while on analgesics and no heavy lifting for 6 weeks    Nursing:   SN to complete comprehensive assessment including routine vital signs. Instruct on disease process and s/s of complications to report to MD. Review/verify medication list sent home with the patient at time of discharge  and instruct patient/caregiver as needed. Frequency may be adjusted depending on start of care date.    Notify MD if SBP > 160 or < 90; DBP > 90 or < 50; HR > 120 or < 50; Temp > 101; Other:        CONSULTS:    None    MISCELLANEOUS CARE:  Colostomy Care:  Instruct patient/caregiver to empty bag when full and PRN., Change and clean site every 48 hours and Monitor skin integrity.    WOUND CARE ORDERS  Midline wound with staples. This  doesn't need a dressing. [place] will come out at follow up.       Medications: Review discharge medications with patient and family and provide education.      Current Discharge Medication List      START taking these medications    Details   fluconazole (DIFLUCAN) 200 MG Tab Take 2 tablets (400 mg total) by mouth once daily. for 6 days  Qty: 12 tablet, Refills: 0      oxyCODONE (ROXICODONE) 5 MG immediate release tablet Take 1 tablet (5 mg total) by mouth every 4 (four) hours as needed.  Qty: 31 tablet, Refills: 0         CONTINUE these medications which have CHANGED    Details   ciprofloxacin HCl (CIPRO) 500 MG tablet Take 1 tablet (500 mg total) by mouth every 12 (twelve) hours. for 6 days  Qty: 12 tablet, Refills: 0      metroNIDAZOLE (FLAGYL) 500 MG tablet Take 1 tablet (500 mg total) by mouth every 8 (eight) hours. for 6 days  Qty: 18 tablet, Refills: 0             I certify that this patient is confined to his home and needs intermittent skilled nursing care.

## 2019-02-06 NOTE — PLAN OF CARE
placed call to [facility] clinic to schedule pt's post op appt with Dr Guidry and KELVIN Peters, CELSO.  Future Appointments   Date Time Provider Department Center   2/25/2019  9:00 AM Samir Guidry MD [facility]   2/25/2019  9:30 AM KELVIN Gage [facility]

## 2019-02-06 NOTE — HOSPITAL COURSE
Patient was admitted for diverticulitis not improved with medical management. Underwent yasmin's procedure which he tolerated very well. Patient was observed in the hospital post operatively whereby he progressed normally. After the first few post operative days he was starting to tolerate clears and was ultimately advanced to a low residue diet which he did very well with. Ostomy function was appropriate after the first few post op days as well with green stool in his bag. He was taught ostomy care which he felt comfortable with. Patient was discharged on a course of abx given his disease and close follow up was arranged.

## 2019-02-06 NOTE — PLAN OF CARE
Problem: Adult Inpatient Plan of Care  Goal: Plan of Care Review  Outcome: Revised  POC reviewed with patient who verbalized understanding.  VSS stable on room air.  AAOX4.  Patient sat in chair most of the day and ambulated between bed, chair and bathroom.  Patient also ambulated in the hallway with the use of a walker.       Midline incision with staples RON, colostomy, and JOSE drain remained clean dry and intact.       Patient free from IV infusions with oral fluids and medications administered.  Patient tolerating liquids diet, denies nausea.  Abdominal pain control with PRN medications per MAR.  Patient denies chest pain or SOB.  No acute events and no distress noted.  Bed in lowest position, call light within reach, frequent rounds made for patient safety.

## 2019-02-06 NOTE — ASSESSMENT & PLAN NOTE
43 y.o. male 5 Days Post-Op for Procedure(s) (LRB):  COLECTOMY, SIGMOID, Colostomy (N/A)    Abx x14 full days, stop date 2/12  Low res diet, ambulate  Discharge today

## 2019-02-06 NOTE — PLAN OF CARE
CM met with patient to discuss discharge to home with Home Health. Patient in agreement with discharge plan. Patient's family will provide transportation home.  will set- up Home Healthcare. CM informed patient of follow up appt with Dr Guidry and KELVIN Peters, CELSO.  Future Appointments   Date Time Provider Department Center   2/25/2019  9:00 AM Samir Guidry MD [facility]   2/25/2019  9:30 AM KELVIN Gage [facility]        02/06/19 1136   Final Note   Assessment Type Final Discharge Note   Anticipated Discharge Disposition Home-Health   What phone number can be called within the next 1-3 days to see how you are doing after discharge? [phone]   Hospital Follow Up  Appt(s) scheduled? Yes   Discharge plans and expectations educations in teach back method with documentation complete? Yes

## 2019-02-06 NOTE — TELEPHONE ENCOUNTER
----- Message from Rosana Byers sent at 2/6/2019 12:42 PM CST -----  Contact: Gisella (home health) [phone]  Gisella would like to speak with you about receiving home health orders from the hospital and needs verification that it will be signed and followed. Please advise.

## 2019-02-07 ENCOUNTER — TELEPHONE (OUTPATIENT)
Dept: SURGERY | Facility: CLINIC | Age: 44
End: 2019-02-07

## 2019-02-07 NOTE — TELEPHONE ENCOUNTER
----- Message from rBittany Dominguez RN sent at 2/6/2019  5:19 PM CST -----  Contact: [facility] Sabina:[phone]  Can you give Sabina a call.  The answering service said that she was gone for the day.  Thank you  ----- Message -----  From: Connie Cagle  Sent: 2/6/2019  12:39 PM  To: [facility] Staff    .Needs Advice    Reason for call:Sabina with [facility] called and states she would like to speak with the nurse. She states she has some questions for the nurse.        Communication Preference:[facility] Sabina:[phone]    Additional Information:

## 2019-02-08 NOTE — PHYSICIAN QUERY
"PT Name: Kyle Abel  MR #: [id number]     Physician Query Form - Documentation Clarification      Brittany Pollard RN, CCDS  Desk # [phone]; silva # [phone] [email or web address]      This form is a permanent document in the medical record.     Query Date: February 7, 2019    By submitting this query, we are merely seeking further clarification of documentation. Please utilize your independent clinical judgment when addressing the question(s) below.    The Medical record reflects the following:    Supporting Clinical Findings Location in Medical Record   Height 5' 10" (1.778 m)   Weight 126.1 kg (278 lb)   BMI 40    VS Flow Sheet    PMHx:   Hypertension       Diagnosed @ 1 1/2 years ago and medication was prescribed - patient reports he did not take medication and does not want treatment for blood pressure    H&P                                                                         Doctor, Please specify diagnosis or diagnoses associated with above clinical findings.    Provider Use Only    (  )  Obesity (BMI>30)   ( x )  Morbid obesity (BMI >40)   (  )  Other: _____________________                                                                                                           [  ] Clinically Undetermined               "

## 2019-02-12 ENCOUNTER — TELEPHONE (OUTPATIENT)
Dept: SURGERY | Facility: HOSPITAL | Age: 44
End: 2019-02-12

## 2019-02-12 NOTE — TELEPHONE ENCOUNTER
----- Message from Jayne Gutierrez RN sent at 2/12/2019  2:53 PM CST -----  Contact: Meghan ([facility]): [phone]      ----- Message -----  From: Willie Beaulieu  Sent: 2/12/2019   2:00 PM  To: [facility] Staff    Needs Advice    Reason for call: Lavell stated a referral was sent for the pt to receive home health while in the hospital and lavell also called to verify Dr. Guidry will follow and also sign orders for home health      Communication Preference: Meghan ([facility]): [phone]

## 2019-02-12 NOTE — PHYSICIAN QUERY
PT Name: Kyle Abel  MR #: [id number]    Physician Query Form - Pathology Findings Clarification     Brittany Pollard RN, CCDS  Desk # [phone]; [facility] # [phone] [email or web address]    This form is a permanent document in the medical record.     Query Date: February 12, 2019    By submitting this query, we are merely seeking further clarification of documentation.  Please utilize your independent clinical judgment when addressing the question(s) below.    The medical record contains the following:     Findings Supporting Clinical Information Location in Medical Record   acute serositis.    -- per path SPECIMEN  1) Sigmoid colon.    FINAL PATHOLOGIC DIAGNOSIS  Sigmoid colon:  - Resection margins unremarkable.  - Diverticulosis with granulation tissue and acute serositis.  - Five benign reactive lymph nodes.  [facility]  Diagnosed by: Jostin Sifuentes  (Electronically Signed: 2019-02-11 14:20:46)    Gross Description  Received in formalin in a specimen container labeled with the patient's name, medical record number [id number], #1 sigmoid colon , is an approximately 27 cm in length and 2.7 cm in diameter unoriented fragment of large bowel with a previously stapled resection margin and moderate amount of attached pericolonic fat. Serosa is tan-gray,  dusky, diffusely hemorrhagic. Pericolonic fat is dusky, diffusely hemorrhagic, covered by moderate amount of tan-white/yellow fibrinous exudate. Stapled margins are removed and bowel is serially sectioned to reveal tan-gray plicated mucosa with multiple diverticula, some of them filled with fecal material. Possible old perforation is identified. No other masses or lesions are identified. The wall thickness ranges from 0.8 cm to 1.5 cm. Serial section of pericolonic fat reveal several tan-gray rubbery possible lymph nodes.   Representative sections are submitted as follows:  A-B. Resection margins  C. Possible old perforated diverticula  D-G. Diverticula  H. lymph  "nodes  Sae Hallman   Pathology Report     Please document the clinical significance of the Pathologists findings of  "Acute Serositis".     [ x  ] I agree with the Pathology Findings   [   ] I do not agree with the Pathology Findings   [   ] Other/Clarification of Findings: ____________   [   ] Clinically Insignificant   [  ] Clinically Undetermined       Please document in your progress notes daily for the duration of treatment until resolved and include in your discharge summary.                   "

## 2019-02-25 ENCOUNTER — OFFICE VISIT (OUTPATIENT)
Dept: SURGERY | Facility: CLINIC | Age: 44
End: 2019-02-25
Payer: COMMERCIAL

## 2019-02-25 ENCOUNTER — OFFICE VISIT (OUTPATIENT)
Dept: WOUND CARE | Facility: CLINIC | Age: 44
End: 2019-02-25
Payer: COMMERCIAL

## 2019-02-25 VITALS
HEART RATE: 59 BPM | WEIGHT: 272.06 LBS | SYSTOLIC BLOOD PRESSURE: 124 MMHG | BODY MASS INDEX: 38.95 KG/M2 | DIASTOLIC BLOOD PRESSURE: 79 MMHG | HEIGHT: 70 IN

## 2019-02-25 DIAGNOSIS — Z43.3 ATTENTION TO COLOSTOMY: Primary | ICD-10-CM

## 2019-02-25 DIAGNOSIS — K57.32 SIGMOID DIVERTICULITIS: Primary | ICD-10-CM

## 2019-02-25 DIAGNOSIS — Z93.3 COLOSTOMY IN PLACE: ICD-10-CM

## 2019-02-25 PROCEDURE — 99999 PR PBB SHADOW E&M-EST. PATIENT-LVL II: CPT | Mod: PBBFAC,,, | Performed by: CLINICAL NURSE SPECIALIST

## 2019-02-25 PROCEDURE — 99999 PR PBB SHADOW E&M-EST. PATIENT-LVL III: CPT | Mod: PBBFAC,,, | Performed by: COLON & RECTAL SURGERY

## 2019-02-25 PROCEDURE — 99024 PR POST-OP FOLLOW-UP VISIT: ICD-10-PCS | Mod: S$GLB,,, | Performed by: COLON & RECTAL SURGERY

## 2019-02-25 PROCEDURE — 99999 PR PBB SHADOW E&M-EST. PATIENT-LVL II: ICD-10-PCS | Mod: PBBFAC,,, | Performed by: CLINICAL NURSE SPECIALIST

## 2019-02-25 PROCEDURE — 99024 PR POST-OP FOLLOW-UP VISIT: ICD-10-PCS | Mod: S$GLB,,, | Performed by: CLINICAL NURSE SPECIALIST

## 2019-02-25 PROCEDURE — 99024 POSTOP FOLLOW-UP VISIT: CPT | Mod: S$GLB,,, | Performed by: COLON & RECTAL SURGERY

## 2019-02-25 PROCEDURE — 99024 POSTOP FOLLOW-UP VISIT: CPT | Mod: S$GLB,,, | Performed by: CLINICAL NURSE SPECIALIST

## 2019-02-25 PROCEDURE — 99999 PR PBB SHADOW E&M-EST. PATIENT-LVL III: ICD-10-PCS | Mod: PBBFAC,,, | Performed by: COLON & RECTAL SURGERY

## 2019-02-25 NOTE — PROGRESS NOTES
This patient is unknown to me and is here in clinic today for first post op evaluation related to colostomy. Surgery done 1/28/19 by Dr. Guidry. The patient reports no  problems with  new ostomy. The patient is receiving home health care with ? Cannot remember.   Pain level today is reported as  1.    The colostomy is 35 mm robbie low to flush stoma.  The patient is currently  wearing a 1piece pouching system by  [employer].   Average wear time is 3 days.   Peristomal skin is clear    There is slight mucocutaneous separation.  Pt is coping well with the new ostomy.  SUPPLIES/DME: sent to EP today  Patient enrolled in [employer] Care Program. Pt gives verbal permission and understanding a representative will call them regarding samples sent and follow up needs.  Samples requested today based on needs or patient requests today.    Pouching concerns include:  He wants opaque and suggested he try convex,  Gave samples and he can decide which he likes best to order for him     Patient instructions for pouching:  Cleanse skin with water, dry well.  Apply no sting skin barrier film . Allow to dry  Apply  pouch sized appropriately for stoma. Reviewed sizing of new stoma        SPECIAL NEEDS:  Pt counseled on skin care, nutrition, hydration as well as  how to order ostomy supplies.  I spent greater than 50% of this 45 minute visit in face to face counseling.

## 2019-02-25 NOTE — LETTER
March 5, 2019      Gilma Cuba NP  [address]  [address]  [place] [address]           [facility]  [address]  [facility] [address]  Phone: [phone]          Patient: Kyle Abel   MR Number: [id number]   YOB: 1975   Date of Visit: 2/25/2019       Dear Gilma Cuba NP:    Thank you for referring Kyle Abel to me for evaluation. Attached you will find relevant portions of my assessment and plan of care.    If you have questions, please do not hesitate to call me. I look forward to following Kyle Abel along with you.    Sincerely,    Samir Guidry MD    Enclosure  CC:  No Recipients    If you would like to receive this communication electronically, please contact [email or web address] or [phone] to request more information on [employer] access.    For providers and/or their staff who would like to refer a patient to [facility], please contact us through our one-stop-shop provider referral line, [facility], at [phone].    If you feel you have received this communication in error or would no longer like to receive these types of communications, please e-mail [email or web address]

## 2019-02-25 NOTE — PROGRESS NOTES
CRS Post-operative visit    Visit Info:     Procedure: Johny's Procedure    Date of Procedure: 2/1/2019    Indication: Diverticulitis    Date of Discharge: 2/6/2019    Current Status: Doing well postoperatively.  States he is rarely taking pain medicine, but supplements with marijuana.  Denies fevers/chills/nausea/vomiting.    FINAL PATHOLOGIC DIAGNOSIS  Sigmoid colon:  - Resection margins unremarkable.  - Diverticulosis with granulation tissue and acute serositis.  - Five benign reactive lymph nodes.    Physical Exam:  General: White male in NAD sitting in chair in clinic  Neuro: aaox4 maex4 perrl  Respiratory: resps even unlabored  Cardiac: cap refill <2 sec  Abdomen: Normal, benign. Incisions:clean, dry, intact, staples removed today in office.     Assessment and Plan:  -Will plan for reversal in another 3-4 months  -Will see in clinic a few weeks prior to surgery  -Had a colonoscopy  In 2017 with two benign polyps    I have interviewed and examined the patient, reviewed the notes and assessments, and/or personally supervised the procedure(s) performed by Dr. Subramanian, and I concur with her/his documentation of Kyle Abel.  See below addendum for my evaluation and additional findings.    44 yo M s/p Johny's procedure 2/1/19 for diverticulitis with purulent peritonitis.  Doing reasonably well post-op.  Managing ostomy well.  Pain well-controlled.    Exam as noted above.    Advance diet & activity as tolerated.   RTO 1 month  Plan colostomy reversal 4-5 months post-op.  Last colonoscopy 9/2017 - 1 small adenoma and 1 hyperplastic polyp removed - don't need to repeat prior to reversal.    Samir Guidry MD, FACS, FASCRS  Staff Surgeon  Department of Colon & Rectal Surgery

## 2019-03-04 LAB — FUNGUS SPEC CULT: NORMAL

## 2019-03-06 ENCOUNTER — OFFICE VISIT (OUTPATIENT)
Dept: FAMILY MEDICINE | Facility: CLINIC | Age: 44
End: 2019-03-06
Payer: COMMERCIAL

## 2019-03-06 VITALS
SYSTOLIC BLOOD PRESSURE: 110 MMHG | DIASTOLIC BLOOD PRESSURE: 80 MMHG | WEIGHT: 274.81 LBS | TEMPERATURE: 98 F | HEART RATE: 86 BPM | OXYGEN SATURATION: 95 % | BODY MASS INDEX: 39.34 KG/M2 | HEIGHT: 70 IN

## 2019-03-06 DIAGNOSIS — Z93.3 COLOSTOMY IN PLACE: ICD-10-CM

## 2019-03-06 DIAGNOSIS — J06.9 URI WITH COUGH AND CONGESTION: Primary | ICD-10-CM

## 2019-03-06 PROCEDURE — 99999 PR PBB SHADOW E&M-EST. PATIENT-LVL IV: CPT | Mod: PBBFAC,,, | Performed by: NURSE PRACTITIONER

## 2019-03-06 PROCEDURE — 99999 PR PBB SHADOW E&M-EST. PATIENT-LVL IV: ICD-10-PCS | Mod: PBBFAC,,, | Performed by: NURSE PRACTITIONER

## 2019-03-06 PROCEDURE — 3074F PR MOST RECENT SYSTOLIC BLOOD PRESSURE < 130 MM HG: ICD-10-PCS | Mod: CPTII,S$GLB,, | Performed by: NURSE PRACTITIONER

## 2019-03-06 PROCEDURE — 99213 PR OFFICE/OUTPT VISIT, EST, LEVL III, 20-29 MIN: ICD-10-PCS | Mod: S$GLB,,, | Performed by: NURSE PRACTITIONER

## 2019-03-06 PROCEDURE — 99213 OFFICE O/P EST LOW 20 MIN: CPT | Mod: S$GLB,,, | Performed by: NURSE PRACTITIONER

## 2019-03-06 PROCEDURE — 3079F PR MOST RECENT DIASTOLIC BLOOD PRESSURE 80-89 MM HG: ICD-10-PCS | Mod: CPTII,S$GLB,, | Performed by: NURSE PRACTITIONER

## 2019-03-06 PROCEDURE — 3079F DIAST BP 80-89 MM HG: CPT | Mod: CPTII,S$GLB,, | Performed by: NURSE PRACTITIONER

## 2019-03-06 PROCEDURE — 3074F SYST BP LT 130 MM HG: CPT | Mod: CPTII,S$GLB,, | Performed by: NURSE PRACTITIONER

## 2019-03-06 PROCEDURE — 3008F PR BODY MASS INDEX (BMI) DOCUMENTED: ICD-10-PCS | Mod: CPTII,S$GLB,, | Performed by: NURSE PRACTITIONER

## 2019-03-06 PROCEDURE — 3008F BODY MASS INDEX DOCD: CPT | Mod: CPTII,S$GLB,, | Performed by: NURSE PRACTITIONER

## 2019-03-06 RX ORDER — IBUPROFEN 600 MG/1
600 TABLET ORAL 4 TIMES DAILY
Qty: 40 TABLET | Refills: 0 | Status: SHIPPED | OUTPATIENT
Start: 2019-03-06 | End: 2019-04-05

## 2019-03-06 RX ORDER — BROMPHENIRAMINE MALEATE, PSEUDOEPHEDRINE HYDROCHLORIDE, AND DEXTROMETHORPHAN HYDROBROMIDE 2; 30; 10 MG/5ML; MG/5ML; MG/5ML
10 SYRUP ORAL EVERY 4 HOURS PRN
Qty: 240 ML | Refills: 0 | Status: SHIPPED | OUTPATIENT
Start: 2019-03-06 | End: 2019-04-01

## 2019-03-06 RX ORDER — FLUTICASONE PROPIONATE 50 MCG
2 SPRAY, SUSPENSION (ML) NASAL DAILY
Qty: 16 G | Refills: 1 | Status: SHIPPED | OUTPATIENT
Start: 2019-03-06 | End: 2019-06-26 | Stop reason: ALTCHOICE

## 2019-03-06 NOTE — PATIENT INSTRUCTIONS
Viral Upper Respiratory Illness (Adult)  You have a viral upper respiratory illness (URI), which is another term for the common cold. This illness is contagious during the first few days. It is spread through the air by coughing and sneezing. It may also be spread by direct contact (touching the sick person and then touching your own eyes, nose, or mouth). Frequent handwashing will decrease risk of spread. Most viral illnesses go away within 7 to 10 days with rest and simple home remedies. Sometimes the illness may last for several weeks. Antibiotics will not kill a virus, and they are generally not prescribed for this condition.    Home care  · If symptoms are severe, rest at home for the first 2 to 3 days. When you resume activity, don't let yourself get too tired.  · Avoid being exposed to cigarette smoke (yours or others).  · You may use acetaminophen or ibuprofen to control pain and fever, unless another medicine was prescribed. (Note: If you have chronic liver or kidney disease, have ever had a stomach ulcer or gastrointestinal bleeding, or are taking blood-thinning medicines, talk with your healthcare provider before using these medicines.) Aspirin should never be given to anyone under 18 years of age who is ill with a viral infection or fever. It may cause severe liver or brain damage.  · Your appetite may be poor, so a light diet is fine. Avoid dehydration by drinking 6 to 8 glasses of fluids per day (water, soft drinks, juices, tea, or soup). Extra fluids will help loosen secretions in the nose and lungs.  · Over-the-counter cold medicines will not shorten the length of time youre sick, but they may be helpful for the following symptoms: cough, sore throat, and nasal and sinus congestion. (Note: Do not use decongestants if you have high blood pressure.)  Follow-up care  Follow up with your healthcare provider, or as advised.  When to seek medical advice  Call your healthcare provider right away if any  of these occur:  · Cough with lots of colored sputum (mucus)  · Severe headache; face, neck, or ear pain  · Difficulty swallowing due to throat pain  · Fever of 100.4°F (38°C)  Call 911, or get immediate medical care  Call emergency services right away if any of these occur:  · Chest pain, shortness of breath, wheezing, or difficulty breathing  · Coughing up blood  · Inability to swallow due to throat pain  Date Last Reviewed: 9/13/2015  © 2939-0200 [employer]. [address] [place] [address]. All rights reserved. This information is not intended as a substitute for professional medical care. Always follow your healthcare professional's instructions.

## 2019-03-06 NOTE — PROGRESS NOTES
Subjective:       Patient ID: Kyle Abel is a 43 y.o. male.    Chief Complaint: Cough; Chest Congestion; and Sinus Problem    Patient is a 43 year old white male with history of Hypertension that resolved with lifestyle modifications, history of rectal bleeding with internal hemorrhoids and colon polyps seen on colonoscopy, and Acute Diverticulitis on 1/20/2019 that ended with a bowel perforation causing colostomy to be placed in Feb. 2019 that is here today with complaint of URI s/s for the past 48 hours.  See notes below      URI    This is a new problem. Episode onset: past 48 hours. The problem has been gradually worsening. There has been no fever. Associated symptoms include congestion, coughing, headaches, rhinorrhea, sinus pain, sneezing and a sore throat. Pertinent negatives include no abdominal pain, chest pain, diarrhea, dysuria, ear pain, nausea, neck pain, rash or vomiting. Associated symptoms comments: Nasal discharge clear to yellow.  Cough is productive light yellow-grayish in color.. He has tried acetaminophen, antihistamine and decongestant (nasal decongestant; OTC sinus medication) for the symptoms. The treatment provided no relief.       Current Outpatient Medications   Medication Sig Dispense Refill    oxymetazoline 0.05 % Mist by Nasal route.       No current facility-administered medications for this visit.        Past Medical History:   Diagnosis Date    Colon polyps 2017    repeat colonoscopy in 5 years    Hemorrhoid     Hypertension     Diagnosed @ 1 1/2 years ago and medication was prescribed - patient reports he did not take medication and does not want treatment for blood pressure    Internal hemorrhoid, bleeding     Rectal bleed        Past Surgical History:   Procedure Laterality Date    CHOLECYSTECTOMY      COLECTOMY, SIGMOID, Colostomy N/A 2/1/2019    [facility] by [facility] 2ND FLR    COLONOSCOPY N/A 9/6/2017    Performed by Chapin Hernandez,  MD at [facility]       Family History   Problem Relation Age of Onset    No Known Problems Mother     Ulcerative colitis Father 57        intestines     No Known Problems Brother        Social History     Socioeconomic History    Marital status:      Spouse name: None    Number of children: None    Years of education: None    Highest education level: None   Social Needs    Financial resource strain: None    Food insecurity - worry: None    Food insecurity - inability: None    Transportation needs - medical: None    Transportation needs - non-medical: None   Occupational History    Occupation:      Employer: [employer]    Tobacco Use    Smoking status: Former Smoker     Packs/day: 1.50     Years: 25.00     Pack years: 37.50     Types: Cigarettes     Last attempt to quit: 6/10/2018     Years since quittin.7    Smokeless tobacco: Current User     Types: Chew   Substance and Sexual Activity    Alcohol use: Yes     Comment: every weekend - 4 drinks either beer or vodka per occasion    Drug use: No    Sexual activity: Yes   Other Topics Concern    None   Social History Narrative    None       Review of Systems   Constitutional: Positive for fatigue. Negative for activity change, appetite change, fever and unexpected weight change.   HENT: Positive for congestion, postnasal drip, rhinorrhea, sinus pressure, sinus pain, sneezing and sore throat. Negative for ear pain, mouth sores, nosebleeds, trouble swallowing and voice change.    Eyes: Negative.    Respiratory: Positive for cough. Negative for chest tightness and shortness of breath.    Cardiovascular: Negative for chest pain, palpitations and leg swelling.   Gastrointestinal: Negative.  Negative for abdominal pain, blood in stool, constipation, diarrhea, nausea and vomiting.   Endocrine: Negative.    Genitourinary: Negative for difficulty urinating, dysuria, flank pain, hematuria and urgency.   Musculoskeletal: Negative  "for arthralgias, back pain, gait problem, joint swelling, myalgias and neck pain.   Skin: Negative for color change, rash and wound.   Allergic/Immunologic: Negative for immunocompromised state.   Neurological: Positive for headaches. Negative for dizziness, tremors, seizures, syncope and speech difficulty.   Hematological: Negative for adenopathy. Does not bruise/bleed easily.   Psychiatric/Behavioral: Negative for behavioral problems, dysphoric mood, sleep disturbance and suicidal ideas. The patient is not nervous/anxious.          Objective:     Vitals:    03/06/19 1028   BP: 110/80   BP Location: Right arm   Patient Position: Sitting   BP Method: Large (Manual)   Pulse: 86   Temp: 98.2 °F (36.8 °C)   TempSrc: Oral   SpO2: 95%   Weight: 124.6 kg (274 lb 12.9 oz)   Height: 5' 10" (1.778 m)          Physical Exam   Constitutional: He is oriented to person, place, and time. He appears well-developed and well-nourished.   + obesity with Body mass index  39.43 kg/m².   HENT:   Head: Normocephalic.   Right Ear: External ear normal.   Left Ear: External ear normal.   Nose: Mucosal edema and rhinorrhea present.   Mouth/Throat: Posterior oropharyngeal erythema present. No oropharyngeal exudate or posterior oropharyngeal edema.   Eyes: EOM are normal. Pupils are equal, round, and reactive to light. Right eye exhibits no discharge. Left eye exhibits no discharge. No scleral icterus.   Neck: Normal range of motion. Neck supple. No tracheal deviation present. No thyromegaly present.   Cardiovascular: Normal rate, regular rhythm and normal heart sounds.   No murmur heard.  Pulmonary/Chest: Effort normal and breath sounds normal. No respiratory distress.   Abdominal: Soft. He exhibits no distension.   Colostomy bag to LLQ in place   Musculoskeletal: Normal range of motion. He exhibits no edema.   Lymphadenopathy:     He has no cervical adenopathy.   Neurological: He is alert and oriented to person, place, and time. Coordination " normal.   Skin: Skin is warm and dry. No rash noted.   Psychiatric: He has a normal mood and affect. His behavior is normal.         Assessment:         ICD-10-CM ICD-9-CM   1. URI with cough and congestion J06.9 465.9   2. Colostomy in place Z93.3 V44.3       Plan:       URI with cough and congestion  -  Suspect viral URI based on symptoms and exam - treat with bromfed, ibuprofen and flonase for 7 days  -     brompheniramine-pseudoeph-DM (BROMFED DM) 2-30-10 mg/5 mL Syrp; Take 10 mLs by mouth every 4 (four) hours as needed (congestion/cough).  Dispense: 240 mL; Refill: 0  -     fluticasone (FLONASE) 50 mcg/actuation nasal spray; 2 sprays (100 mcg total) by Each Nare route once daily.  Dispense: 16 g; Refill: 1  -     ibuprofen (ADVIL,MOTRIN) 600 MG tablet; Take 1 tablet (600 mg total) by mouth 4 (four) times daily.  Dispense: 40 tablet; Refill: 0    Colostomy in place      Follow-up if symptoms worsen or fail to improve.     Patient's Medications   New Prescriptions    BROMPHENIRAMINE-PSEUDOEPH-DM (BROMFED DM) 2-30-10 MG/5 ML SYRP    Take 10 mLs by mouth every 4 (four) hours as needed (congestion/cough).    FLUTICASONE (FLONASE) 50 MCG/ACTUATION NASAL SPRAY    2 sprays (100 mcg total) by Each Nare route once daily.    IBUPROFEN (ADVIL,MOTRIN) 600 MG TABLET    Take 1 tablet (600 mg total) by mouth 4 (four) times daily.   Previous Medications    OXYMETAZOLINE 0.05 % MIST    by Nasal route.   Modified Medications    No medications on file   Discontinued Medications    IBUPROFEN ORAL    Take by mouth.    OXYCODONE (ROXICODONE) 5 MG IMMEDIATE RELEASE TABLET    Take 1 tablet (5 mg total) by mouth every 4 (four) hours as needed.

## 2019-03-07 ENCOUNTER — PATIENT MESSAGE (OUTPATIENT)
Dept: SURGERY | Facility: CLINIC | Age: 44
End: 2019-03-07

## 2019-03-08 ENCOUNTER — TELEPHONE (OUTPATIENT)
Dept: SURGERY | Facility: CLINIC | Age: 44
End: 2019-03-08

## 2019-03-08 NOTE — TELEPHONE ENCOUNTER
----- Message from Wilile Beaulieu sent at 3/8/2019 11:53 AM CST -----  Contact: pt: [phone]  Needs Advice    Reason for call: pt would like to speak with someone re getting a return to work letter         Communication Preference: pt: [phone]

## 2019-03-11 ENCOUNTER — TELEPHONE (OUTPATIENT)
Dept: SURGERY | Facility: CLINIC | Age: 44
End: 2019-03-11

## 2019-03-11 NOTE — TELEPHONE ENCOUNTER
----- Message from Taisha Jennings sent at 3/11/2019 10:21 AM CDT -----  Contact: self [phone]  Needs Advice    Reason for call: Pt called asking to speak to a nurse in regarding his work letter.         Communication Preference: self [phone]    Additional Information:

## 2019-03-12 ENCOUNTER — TELEPHONE (OUTPATIENT)
Dept: SURGERY | Facility: CLINIC | Age: 44
End: 2019-03-12

## 2019-03-12 NOTE — TELEPHONE ENCOUNTER
----- Message from Yvonne uRbalcava sent at 3/12/2019  3:02 PM CDT -----  Contact: pt#[phone]  Needs Advice    Reason for call:Pt wants to speak with you about his return to work day      Communication Preference:call    Additional Information:

## 2019-03-25 ENCOUNTER — TELEPHONE (OUTPATIENT)
Dept: SURGERY | Facility: CLINIC | Age: 44
End: 2019-03-25

## 2019-03-25 ENCOUNTER — TELEPHONE (OUTPATIENT)
Dept: SURGERY | Facility: HOSPITAL | Age: 44
End: 2019-03-25

## 2019-03-25 NOTE — TELEPHONE ENCOUNTER
----- Message from Shannan Draper sent at 3/25/2019 11:03 AM CDT -----  Contact: [employer]  [phone]-please call [employer] company on above patient said patient is out of supplies for pouch box of 30 and deodorant call number in message thanks

## 2019-03-25 NOTE — SUBJECTIVE & OBJECTIVE
Interval History: Sigmoid colectomy today.    Review of Systems   Constitutional: Positive for appetite change and fever. Negative for activity change, chills and fatigue.   Respiratory: Negative for cough, chest tightness, shortness of breath and wheezing.    Cardiovascular: Negative for chest pain and leg swelling.   Gastrointestinal: Positive for abdominal pain. Negative for abdominal distention, anal bleeding, blood in stool, constipation, diarrhea, nausea, rectal pain and vomiting.   Genitourinary: Negative for difficulty urinating, enuresis, flank pain, frequency, genital sores and hematuria.   Musculoskeletal: Negative for back pain, gait problem and joint swelling.   Skin: Negative.  Negative for color change.   Neurological: Negative for dizziness, syncope and numbness.   Psychiatric/Behavioral: Negative for agitation, confusion, decreased concentration, dysphoric mood and hallucinations. The patient is not nervous/anxious and is not hyperactive.      Objective:     Vital Signs (Most Recent):  Temp: 99 °F (37.2 °C) (02/01/19 1129)  Pulse: 87 (02/01/19 1129)  Resp: 18 (02/01/19 1129)  BP: 115/72 (02/01/19 1129)  SpO2: 96 % (02/01/19 1129) Vital Signs (24h Range):  Temp:  [98.1 °F (36.7 °C)-99.2 °F (37.3 °C)] 99 °F (37.2 °C)  Pulse:  [] 87  Resp:  [15-18] 18  SpO2:  [96 %-99 %] 96 %  BP: (111-131)/(69-85) 115/72     Weight: 126.1 kg (278 lb)  Body mass index is 39.89 kg/m².    Estimated Creatinine Clearance: 142.5 mL/min (based on SCr of 0.9 mg/dL).    Physical Exam   Constitutional: He is oriented to person, place, and time. He appears well-developed and well-nourished. No distress.   HENT:   Head: Normocephalic and atraumatic.   Eyes: EOM are normal. Pupils are equal, round, and reactive to light.   Cardiovascular: Normal rate, regular rhythm and normal heart sounds.   Pulmonary/Chest: Effort normal and breath sounds normal. No respiratory distress. He has no wheezes. He has no rales.   Abdominal:  SPOKE TO PTS  MOM, SHE IS OK WITH YOU SENDING THE ZOLOFT TO THE PHARMACY AND SHE WILL DECIDE IF SHE WANTS TO GIVE IT TO HER  I ADVISED HER SHE NEEDS TO GET HER ESTABLISHED WITH PSYCHIATRY TO CONTINUE MEDICATION MANAGEMENT  SHE SAID SHE DOESN'T FEEL THAT HELPS BECAUSE SHE WOULD GO IN AND SINCE SHE CANNOT VERBALIZE HOW SHE IS FEELING MOM FEELS IT WOULD NOT BE BENEFICIAL  I DID MAKE HER AWARE THAT THIS TYPE OF MEDICATION NEEDS TO BE MONITORED BY A PSYCHIATRIST AND NOT INTERNAL MEDICINE  I ALSO MADE HER AWARE IT WILL TAKE A FEW DAYS BEFORE SHE SEES ANY CHANGES AND IF SHE HAS ANY PROBLEMS WITH IT THEN SHE IS TO CALL BACK  I ALSO DISCUSSED THE CLONIDINE WITH HER AND SHE UNDERSTOOD THE SIDE EFFECT AND REASON TO NOT GIVE IT  SHE WILL THINK ABOUT THE PSYCHIATRIST APPT  MOM ALSO WANTED TO BE SURE THIS MEDICATION WILL NOT CONFLICT WITH HER HISTORY OF SEIZURES      CAN YOU CALL IN TO [facility]. He exhibits no distension and no mass. There is tenderness. There is no guarding.   Pain with palpation to entire abdomen, worse in suprapubic region, LLQ and LUQ   Musculoskeletal: Normal range of motion.   Neurological: He is alert and oriented to person, place, and time.   Skin: Skin is warm and dry.   Psychiatric: He has a normal mood and affect. His behavior is normal. Judgment and thought content normal.   Nursing note and vitals reviewed.      Significant Labs:   Blood Culture:   Recent Labs   Lab 01/28/19  1356 01/28/19  1409   LABBLOO No Growth to date  No Growth to date  No Growth to date  No Growth to date No Growth to date  No Growth to date  No Growth to date  No Growth to date     BMP:   Recent Labs   Lab 02/01/19  0436   GLU 81      K 4.0      CO2 24   BUN 11   CREATININE 0.9   CALCIUM 8.7   MG 2.1     CBC:   Recent Labs   Lab 01/31/19  0738 02/01/19  0436   WBC 14.79* 10.80   HGB 13.1* 10.9*   HCT 41.7 35.3*    248     All pertinent labs within the past 24 hours have been reviewed.    Significant Imaging: I have reviewed all pertinent imaging results/findings within the past 24 hours.     CT Abdo/Pelvis 1/28/19  1. In this patient with known sigmoid diverticulitis.  There is increased inflammation in the region, with an increase in relatively disorganized fluid.  There is 1 small focus of possible early fluid organization/abscess measuring approximately 2.3 x 1.1 cm.  No definite air within the fluid collection.  Scattered foci of air are noted about the region of diverticulitis suggesting sequela of micro perforation.  In comparison to the previous exam, this has increased.  Continued follow-up advised.  2. Hepatic steatosis, correlation with LFTs advised.  3. Additional findings above.

## 2019-03-25 NOTE — TELEPHONE ENCOUNTER
----- Message from Brittany Dominguez RN sent at 3/25/2019  4:51 PM CDT -----  Contact: [employer]      ----- Message -----  From: Shannan Draper  Sent: 3/25/2019  11:03 AM  To: Chao Dawson Staff    [phone]-please call [employer] company on above patient said patient is out of supplies for pouch box of 30 and deodorant call number in message thanks

## 2019-04-01 ENCOUNTER — OFFICE VISIT (OUTPATIENT)
Dept: SURGERY | Facility: CLINIC | Age: 44
End: 2019-04-01
Payer: COMMERCIAL

## 2019-04-01 VITALS
HEIGHT: 70 IN | HEART RATE: 74 BPM | DIASTOLIC BLOOD PRESSURE: 87 MMHG | BODY MASS INDEX: 40.21 KG/M2 | WEIGHT: 280.88 LBS | SYSTOLIC BLOOD PRESSURE: 128 MMHG

## 2019-04-01 DIAGNOSIS — Z93.3 COLOSTOMY IN PLACE: ICD-10-CM

## 2019-04-01 DIAGNOSIS — K57.32 SIGMOID DIVERTICULITIS: Primary | ICD-10-CM

## 2019-04-01 PROCEDURE — 99999 PR PBB SHADOW E&M-EST. PATIENT-LVL III: ICD-10-PCS | Mod: PBBFAC,,, | Performed by: COLON & RECTAL SURGERY

## 2019-04-01 PROCEDURE — 99024 PR POST-OP FOLLOW-UP VISIT: ICD-10-PCS | Mod: S$GLB,,, | Performed by: COLON & RECTAL SURGERY

## 2019-04-01 PROCEDURE — 99999 PR PBB SHADOW E&M-EST. PATIENT-LVL III: CPT | Mod: PBBFAC,,, | Performed by: COLON & RECTAL SURGERY

## 2019-04-01 PROCEDURE — 99024 POSTOP FOLLOW-UP VISIT: CPT | Mod: S$GLB,,, | Performed by: COLON & RECTAL SURGERY

## 2019-04-01 NOTE — PROGRESS NOTES
CRS Post-operative visit    Visit Info:     Procedure: Johny's Procedure    Date of Procedure: 2/1/2019    Indication: Diverticulitis    Date of Discharge: 2/6/2019    Current Status: doing well overall. Is back at work. No pouching issues unless if he has diarrhea which does not happen often. Passes mucous from rectum intermittently. No acute fevers/chills. Notes occasional discomfort at JOSE site    FINAL PATHOLOGIC DIAGNOSIS  Sigmoid colon:  - Resection margins unremarkable.  - Diverticulosis with granulation tissue and acute serositis.  - Five benign reactive lymph nodes.    Physical Exam:  General: White male in NAD sitting in chair in clinic  Neuro: aaox4 maex4 perrl  Respiratory: resps even unlabored  Cardiac: cap refill <2 sec  Abdomen: Normal, benign. Incision well healed. Stoma pink/viable    Assessment and Plan:  Plan for reversal late June  Stoma care as needed  RTC for preop visit    Kyle Mcdaniel   Colorectal Surgery Fellow    I have interviewed and examined the patient, reviewed the notes and assessments, and/or personally supervised the procedure(s) performed by Dr. Mcdaniel, and I concur with her/his documentation of Kyle Abel.  See below addendum for my evaluation and additional findings.    Doing well postop, back to work, managing his ostomy well.  Will tentatively aim for 06/21/2019 for surgery for Johny's closure.  Last colonoscopy was 9/2017 - 2 small polyps removed, 1 was hyperplastic, the other had a suggestion of adenomatous change - he does not need another colonoscopy prior to colostomy closure given that his prior colonoscopy was less than 2 years ago.  RTO early June for preop visit.    Samir Guidry MD, FACS, FASCRS  Staff Surgeon  Department of Colon & Rectal Surgery

## 2019-04-01 NOTE — LETTER
April 8, 2019      Gilma Cuba NP  [address]  [address]  [place] [address]           [facility]  [address]  [facility] [address]  Phone: [phone]          Patient: Kyle Abel   MR Number: [id number]   YOB: 1975   Date of Visit: 4/1/2019       Dear Gilma Cuba NP:    Thank you for referring Kyle Abel to me for evaluation. Attached you will find relevant portions of my assessment and plan of care.    If you have questions, please do not hesitate to call me. I look forward to following Kyle Abel along with you.    Sincerely,    Samir Guidry MD    Enclosure  CC:  No Recipients    If you would like to receive this communication electronically, please contact [email or web address] or [phone] to request more information on [facility] Link access.    For providers and/or their staff who would like to refer a patient to [facility], please contact us through our one-stop-shop provider referral line, [facility], at [phone].    If you feel you have received this communication in error or would no longer like to receive these types of communications, please e-mail [email or web address]

## 2019-04-11 ENCOUNTER — TELEPHONE (OUTPATIENT)
Dept: SURGERY | Facility: CLINIC | Age: 44
End: 2019-04-11

## 2019-04-11 NOTE — TELEPHONE ENCOUNTER
----- Message from Janny Grant sent at 4/11/2019  2:32 PM CDT -----  Contact: pt [phone]  Needs Advice    Reason for call: pt called stated he need another return to work form.  He was released on or around 3/15/19.  Pt would prefer you to email to him        Communication Preference: [email or web address]    Additional Information:

## 2019-05-09 ENCOUNTER — TELEPHONE (OUTPATIENT)
Dept: FAMILY MEDICINE | Facility: CLINIC | Age: 44
End: 2019-05-09

## 2019-05-09 DIAGNOSIS — Z01.818 PREOP TESTING: Primary | ICD-10-CM

## 2019-05-09 NOTE — TELEPHONE ENCOUNTER
"----- Message from Gilma Cuba NP sent at 5/9/2019 12:48 PM CDT -----  Regarding: RE: "Clearance" request  To Yelitza,    Patient will need an appointment for preoperative clearance within 30 days of the scheduled surgery so appt with me around after June 1st.  Also he will need that CBC, BMP that was ordered for preop to be done within 30 days of the surgery date - I can use those results for my clearance so make sure labs done before visit scheduled with me  and will get EKG in my office - schedule appointments please.    RADHA Dillard  ----- Message -----  From: Jocelynn Lockett MA  Sent: 5/9/2019  11:35 AM  To: Gilma Cuba NP  Subject: FW: "Clearance" request                              ----- Message -----  From: Ivette Lawrence RN  Sent: 5/9/2019  10:58 AM  To: Johana Isaac RN, Ivette Lawrence RN, #  Subject: "Clearance" request                              Patient is having a Colostomy Reversal on 6/21/19, with Dr. Guidry. Patient was recently seen by you on 3/6/19, for URI with cough/focused visit. Requesting a "Clearance" from you, prior to patients' surgery date. Await your reply. Thank you. Sincerely, Ivette Lawrence RN [facility]-ext. [phone]    "

## 2019-05-09 NOTE — TELEPHONE ENCOUNTER
Called patient and made his Preoperative Clearance appointment for 6/10/2019 and fasting lab appointment for 6/5/2019 in [place].

## 2019-05-10 ENCOUNTER — TELEPHONE (OUTPATIENT)
Dept: FAMILY MEDICINE | Facility: CLINIC | Age: 44
End: 2019-05-10

## 2019-05-10 NOTE — TELEPHONE ENCOUNTER
"----- Message from Gilma Cuba NP sent at 5/10/2019 11:12 AM CDT -----  Regarding: RE: "Clearance" request  Hi Jocelynn,    I see you have taken care of scheduling patient for preop labs and exam in June.  I also seen that he has a WELLNESS appt scheduled on 5/23/2019 - please call patient and advise him we will cancel this appointment and complete WELLNESS exam after surgery completed because it must be done after 6/15/2019 anyway as that was when done last year.    RADHA Dillard  ----- Message -----  From: Gilma Cuba NP  Sent: 5/9/2019  12:48 PM  To: Ivette Lawrence RN, Gilma Cuba NP, #  Subject: RE: "Clearance" request                          To Yelitza,    Patient will need an appointment for preoperative clearance within 30 days of the scheduled surgery so appt with me around after June 1st.  Also he will need that CBC, BMP that was ordered for preop to be done within 30 days of the surgery date - I can use those results for my clearance so make sure labs done before visit scheduled with me  and will get EKG in my office - schedule appointments please.    RADHA Dillard  ----- Message -----  From: Jocelynn Lockett MA  Sent: 5/9/2019  11:35 AM  To: Gilma Cuba NP  Subject: FW: "Clearance" request                              ----- Message -----  From: Ivette Lawrence RN  Sent: 5/9/2019  10:58 AM  To: Johana Isaac RN, Ivette Lawrence RN, #  Subject: "Clearance" request                              Patient is having a Colostomy Reversal on 6/21/19, with Dr. Guidry. Patient was recently seen by you on 3/6/19, for URI with cough/focused visit. Requesting a "Clearance" from you, prior to patients' surgery date. Await your reply. Thank you. Sincerely, Ivette Lawrence RN [facility]-ext. [phone]      "

## 2019-05-10 NOTE — TELEPHONE ENCOUNTER
Called and spoke with patient about his Wellness appointment on 5/23/2019. I informed patient that we will have to cancel appointment , and wait until after surgery to do his Wellness after 6/15/2019 since thats when he had it done last year.    Patient stated he understood, but needed Wellness done for job to turn in his Physical Papers

## 2019-05-13 NOTE — TELEPHONE ENCOUNTER
Patient states he talked to his job and they state he has until 30 days of last wellness to have physical. Patient states he will like to get his surgery out of the way before he schedules labs and wellness.

## 2019-05-13 NOTE — TELEPHONE ENCOUNTER
Find out when patient has to have physical paper for work completed by - because if so - he needs fasting labs BEFORE his WELLNESS exam on 5/23/2019 and since his last wellness was on 6/13/2019 - I can not promise that his insurance will pay for it - if the physical paper not due before 6/13/2019 - we can move appt til that time but I will need to add on additional bloodwork.

## 2019-05-16 ENCOUNTER — ANESTHESIA EVENT (OUTPATIENT)
Dept: SURGERY | Facility: HOSPITAL | Age: 44
DRG: 333 | End: 2019-05-16
Payer: COMMERCIAL

## 2019-05-16 NOTE — ANESTHESIA PREPROCEDURE EVALUATION
" Anesthesia Assessment: Preoperative EQUATION    Planned Procedure: Procedure(s) (LRB):  COLOSTOMY-REVERSAL (N/A)  Requested Anesthesia Type:General  Surgeon: Samir Guidry MD  Service: Colon and Rectal  Known or anticipated Date of Surgery:6/21/2019    Surgeon notes: reviewed and Colostomy in place    Previous anesthesia records:2/1/19-Colectomy, Sigmoid, Colostomy-General-Complicating Factors: Anterior larynx, Retrognathia, Short neck, Obesity; tolerated procedure well and no apparent anesthetic complications      Anesthesia Hx:  No previous Anesthesia  Neg history of prior surgery. Denies Family Hx of Anesthesia complications.      Last PCP note: within 3 months , within [facility] , focused visit   Subspecialty notes: Wound Care-Enterostomal  Tests already available:  Results have been reviewed.EKG-2/5/19      Plan:    Testing:  Hematology Profile, BMP and appt. on 6/5/19 for these labs per "Clearing NP-Ms Cuba-PENDING     Patient  has previously scheduled Medical Appointment:[facility] on 5/27/19, prior to surgery date.    Navigation: Tests Scheduled. Labs- Hem Prof/BMP on 6/5/19 @ 8:30a             Consults scheduled. Phone Completed and   [facility] PCP Dr. Cuba for "Clearance" on 6/14/19 @ 10a-PENDING             Results will be tracked by Preop Clinic.                Ivette Lawrence RN  5/16/19      Addendum:Labs done & reviewed. "Clearance" pending  Per PCP- awaiting CXR results:appt. on 6/17/19, @  4:15p.  Ivette Lawrence RN  6/17/19                           Addendum:Appt. on 6/14/19 with [facility] PCP-Dr. Cuba :Addendum Note added -("Clearance" statement added on 6/18/19), after PCP  reviewed CXR results from 6/17/19 appt. . Ivette Lawrence RN  6/18/19 05/16/2019  Kyle Abel is a 44 y.o., male.    Anesthesia Evaluation         Review of Systems  Anesthesia Hx:  No problems with previous Anesthesia Denies Family " Hx of Anesthesia complications.   Denies Personal Hx of Anesthesia complications.   Social:  Former Smoker, Social Alcohol Use Chews tobacco   Cardiovascular:   Exercise tolerance: good Hypertension Walking 1-3 miles/day   Pulmonary:   Recent URI Sleep Apnea, CPAP Patient will bring C-PAP on DOS/March,2019-recent URI   Hepatic/GI:   Colostomy in place       Physical Exam  General:  Morbid Obesity    Airway/Jaw/Neck:  Airway Findings: Mallampati: III TM Distance: Normal, at least 6 cm  Jaw/Neck Findings:  Neck ROM: Normal ROM       Chest/Lungs:  Chest/Lungs Clear    Heart/Vascular:  Heart Findings: Normal       Mental Status:  Mental Status Findings:  Cooperative, Alert and Oriented     Ivette Lawrence RN  5/16/19    Anesthesia Plan  Type of Anesthesia, risks & benefits discussed:  Anesthesia Type:  general  Patient's Preference: GA  Intra-op Monitoring Plan: standard ASA monitors  Intra-op Monitoring Plan Comments:   Post Op Pain Control Plan: multimodal analgesia, IV/PO Opiods PRN and per primary service following discharge from PACU  Post Op Pain Control Plan Comments:   Induction:   IV  Beta Blocker:  Patient is not currently on a Beta-Blocker (No further documentation required).       Informed Consent: Patient understands risks and agrees with Anesthesia plan.  Questions answered. Anesthesia consent signed with patient.  ASA Score: 3     Day of Surgery Review of History & Physical:    H&P update referred to the surgeon.     Anesthesia Plan Notes: The patient's PMH was reviewed and PE was performed  Plan for GA        Ready For Surgery From Anesthesia Perspective.

## 2019-05-16 NOTE — PRE ADMISSION SCREENING
"Anesthesia Assessment: Preoperative EQUATION    Planned Procedure: Procedure(s) (LRB):  COLOSTOMY-REVERSAL (N/A)  Requested Anesthesia Type:General  Surgeon: Samir Guidry MD  Service: Colon and Rectal  Known or anticipated Date of Surgery:6/21/2019    Surgeon notes: reviewed and Colostomy in place    Previous anesthesia records:2/1/19-Colectomy, Sigmoid, Colostomy-General-Complicating Factors: Anterior larynx, Retrognathia, Short neck, Obesity; tolerated procedure well and no apparent anesthetic complications      Anesthesia Hx:  No previous Anesthesia  Neg history of prior surgery. Denies Family Hx of Anesthesia complications.      Last PCP note: within 3 months , within [facility] , focused visit   Subspecialty notes: Wound Care-Enterostomal  Tests already available:  Results have been reviewed.EKG-2/5/19      Plan:    Testing:  Hematology Profile, BMP and appt. on 6/5/19 for these labs per "Clearing NP-Ms Cuba-PENDING     Patient  has previously scheduled Medical Appointment:[facility] on 5/27/19, prior to surgery date.    Navigation: Tests Scheduled. Labs- Hem Prof/BMP on 6/5/19 @ 8:30a             Consults scheduled. Phone Completed and   [facility] PCP Dr. Cuba for "Clearance" on 6/10/19 @ 1:30p-PENDING             Results will be tracked by Preop Clinic.                Ivette Lawrence RN  5/16/19  "

## 2019-05-27 ENCOUNTER — OFFICE VISIT (OUTPATIENT)
Dept: SURGERY | Facility: CLINIC | Age: 44
End: 2019-05-27
Payer: COMMERCIAL

## 2019-05-27 VITALS
BODY MASS INDEX: 41.97 KG/M2 | HEIGHT: 70 IN | DIASTOLIC BLOOD PRESSURE: 89 MMHG | SYSTOLIC BLOOD PRESSURE: 156 MMHG | HEART RATE: 72 BPM | WEIGHT: 293.19 LBS

## 2019-05-27 DIAGNOSIS — Z93.3 COLOSTOMY IN PLACE: ICD-10-CM

## 2019-05-27 DIAGNOSIS — K57.32 SIGMOID DIVERTICULITIS: ICD-10-CM

## 2019-05-27 DIAGNOSIS — Z86.010 HISTORY OF COLON POLYPS: ICD-10-CM

## 2019-05-27 DIAGNOSIS — K94.09 COLOSTOMY HERNIA: Primary | ICD-10-CM

## 2019-05-27 PROCEDURE — 3008F PR BODY MASS INDEX (BMI) DOCUMENTED: ICD-10-PCS | Mod: CPTII,S$GLB,, | Performed by: COLON & RECTAL SURGERY

## 2019-05-27 PROCEDURE — 3079F PR MOST RECENT DIASTOLIC BLOOD PRESSURE 80-89 MM HG: ICD-10-PCS | Mod: CPTII,S$GLB,, | Performed by: COLON & RECTAL SURGERY

## 2019-05-27 PROCEDURE — 3008F BODY MASS INDEX DOCD: CPT | Mod: CPTII,S$GLB,, | Performed by: COLON & RECTAL SURGERY

## 2019-05-27 PROCEDURE — 99213 OFFICE O/P EST LOW 20 MIN: CPT | Mod: S$GLB,,, | Performed by: COLON & RECTAL SURGERY

## 2019-05-27 PROCEDURE — 3079F DIAST BP 80-89 MM HG: CPT | Mod: CPTII,S$GLB,, | Performed by: COLON & RECTAL SURGERY

## 2019-05-27 PROCEDURE — 99999 PR PBB SHADOW E&M-EST. PATIENT-LVL III: CPT | Mod: PBBFAC,,, | Performed by: COLON & RECTAL SURGERY

## 2019-05-27 PROCEDURE — 3077F PR MOST RECENT SYSTOLIC BLOOD PRESSURE >= 140 MM HG: ICD-10-PCS | Mod: CPTII,S$GLB,, | Performed by: COLON & RECTAL SURGERY

## 2019-05-27 PROCEDURE — 99213 PR OFFICE/OUTPT VISIT, EST, LEVL III, 20-29 MIN: ICD-10-PCS | Mod: S$GLB,,, | Performed by: COLON & RECTAL SURGERY

## 2019-05-27 PROCEDURE — 99999 PR PBB SHADOW E&M-EST. PATIENT-LVL III: ICD-10-PCS | Mod: PBBFAC,,, | Performed by: COLON & RECTAL SURGERY

## 2019-05-27 PROCEDURE — 3077F SYST BP >= 140 MM HG: CPT | Mod: CPTII,S$GLB,, | Performed by: COLON & RECTAL SURGERY

## 2019-05-27 RX ORDER — METRONIDAZOLE 500 MG/1
500 TABLET ORAL SEE ADMIN INSTRUCTIONS
Qty: 3 TABLET | Refills: 0 | Status: ON HOLD | OUTPATIENT
Start: 2019-05-27 | End: 2019-06-24 | Stop reason: HOSPADM

## 2019-05-27 RX ORDER — NEOMYCIN SULFATE 500 MG/1
1000 TABLET ORAL SEE ADMIN INSTRUCTIONS
Qty: 6 TABLET | Refills: 0 | Status: ON HOLD | OUTPATIENT
Start: 2019-05-27 | End: 2019-06-24 | Stop reason: HOSPADM

## 2019-05-27 NOTE — LETTER
May 27, 2019      Gilma Cuba NP  [address]  [address]  [facility] [place] [address]           [facility]  [address]  [facility] [address]  Phone: [phone]          Patient: Kyle Abel   MR Number: [id number]   YOB: 1975   Date of Visit: 5/27/2019       Dear Gilma Cuba NP:    Thank you for referring Kyle Abel to me for evaluation. Attached you will find relevant portions of my assessment and plan of care.    If you have questions, please do not hesitate to call me. I look forward to following Kyle Abel along with you.    Sincerely,    Samir Guidry MD    Enclosure  CC:  No Recipients    If you would like to receive this communication electronically, please contact [email or web address] or [phone] to request more information on [facility] access.    For providers and/or their staff who would like to refer a patient to [facility], please contact us through our one-stop-shop provider referral line, [facility] , at [phone].    If you feel you have received this communication in error or would no longer like to receive these types of communications, please e-mail [email or web address]

## 2019-05-27 NOTE — PROGRESS NOTES
[facility]  General Surgery  History & Physical    Patient Name: Kyle Abel  MRN: [id number]  Admission Date: (Not on file)  Attending Physician: Dr. Guidry  Primary Care Provider: Gilma Cuba NP    Patient information was obtained from patient and ER records.     Subjective:     Chief Complaint/Reason for Admission: Take down of Momin's    History of Present Illness:    Mr. Abel is a 45 yo M with PMH of HTN and Hemorrhoids who recently underwent an Ex lap and Johny's procedure for Complicated diverticulitis not responsive to medical management on 2/1/2019 with Dr. Guidry who presents to be evaluated for Ostomy take down.    Since surgery patient has been doing well with his colostomy bag. Has been doing well, walking around and tolerating a diet. He now has a large parastomal hernia.     He continues to smoke 1 PPD.     He had a prior colonoscopy on 2017 and was found to have an 8mm ascending adenoma polyp removed and 4mm hyperplastic polyp removed from sigmoid, internal hemorrhoids.    No history of Colon or rectal cancer  Father has UC.     Current Outpatient Medications on File Prior to Visit   Medication Sig    fluticasone (FLONASE) 50 mcg/actuation nasal spray 2 sprays (100 mcg total) by Each Nare route once daily.     No current facility-administered medications on file prior to visit.        Review of patient's allergies indicates:   Allergen Reactions    Penicillins Anaphylaxis and Swelling     As a child age 11       Past Medical History:   Diagnosis Date    Colon polyps 2017    repeat colonoscopy in 5 years    Hemorrhoid     Hypertension     Diagnosed @ 1 1/2 years ago and medication was prescribed - patient reports he did not take medication and does not want treatment for blood pressure    Internal hemorrhoid, bleeding     Rectal bleed      Past Surgical History:   Procedure Laterality Date    CHOLECYSTECTOMY      COLECTOMY, SIGMOID, Colostomy N/A 2/1/2019     [facility] 2ND F[facility]    COLONOSCOPY N/A 2017    Per[facility] Chapin [facility] at [facility]     Family History     Problem Relation (Age of Onset)    No Known Problems Mother, Brother    Ulcerative colitis Father (57)        Tobacco Use    Smoking status: Former Smoker     Packs/day: 1.50     Years: 25.00     Pack years: 37.50     Types: Cigarettes     Last attempt to quit: 6/10/2018     Years since quittin.9    Smokeless tobacco: Current User     Types: Chew   Substance and Sexual Activity    Alcohol use: Yes     Comment: every weekend - 4 drinks either beer or vodka per occasion    Drug use: No    Sexual activity: Yes     Review of Systems   Constitutional: Negative for chills and fever.   HENT: Negative for congestion and sinus pressure.    Eyes: Negative for visual disturbance.   Respiratory: Negative for cough and shortness of breath.    Cardiovascular: Negative for chest pain and palpitations.   Gastrointestinal: Negative for abdominal distention, abdominal pain, anal bleeding, blood in stool, constipation, diarrhea, nausea, rectal pain and vomiting.   Endocrine: Negative for cold intolerance and heat intolerance.   Genitourinary: Negative for dysuria and frequency.   Musculoskeletal: Negative for arthralgias and back pain.   Skin: Negative for rash.   Allergic/Immunologic: Negative for immunocompromised state.   Neurological: Negative for dizziness, light-headedness and headaches.   Psychiatric/Behavioral: Negative for confusion. The patient is not nervous/anxious.               Objective:     Vital Signs (Most Recent):  Pulse: 72 (19 0913)  BP: (!) 156/89 (19 0913) Vital Signs (24h Range):  [unfilled]     Weight: 133 kg (293 lb 3.4 oz)  Body mass index is 42.07 kg/m².    Physical Exam   Constitutional: He is oriented to person, place, and time. He appears well-developed and well-nourished.   HENT:   Head: Normocephalic and atraumatic.   Eyes:  Conjunctivae are normal.   Pulmonary/Chest: Effort normal. No respiratory distress.   Abdominal: Soft. He exhibits no distension and no mass. There is no tenderness. There is no rebound and no guarding. A hernia ( Large reducible parastomal hernia) is present.   Low midline scar, LLQ colostomy with a reducible hernia   Neurological: He is alert and oriented to person, place, and time.   Skin: Skin is warm and dry. No erythema.       Assessment/Plan:     Mr Abel is a 43 yo M with PMH of HTN and Hemorrhoids who is s/p ex lap and Johny's procedure for complicated diverticulitis,  not responsive to medical management, now with a minimally symptomatic parastomal hernia.    He is scheduled for a laparotomy with colostomy reversal and parastomal hernia repair on 6/21/2019.    - Consent obtained.   - Advised to quit smoking.     Heidi Hunter MD  General Surgery PGY V  Beeper: [phone]    I have interviewed and examined the patient, reviewed the notes and assessments, and/or personally supervised the procedure(s) performed by Dr. Israel France, and I concur with her/his documentation of Kyle Abel.  See below addendum for my evaluation and additional findings.    43 yo M status post Johny's procedure for complicated diverticulitis February 2019, presents today for a preop visit prior to colostomy closure scheduled for 06/21/2019.  He has developed a parastomal hernia which at the present time is minimally symptomatic.  He is eating well with no nausea or vomiting.  He is emptying his ostomy appliance easily and pouching easily.  No abdominal pain.  No fevers or chills.  He had previously quit smoking, but has since resumed smoking 1 pack per day.    He had a colonoscopy in 2017 which revealed 1 small adenoma and 1 hyperplastic polyp which were both removed, as well as diverticulosis and internal hemorrhoids.  He has undergone prior internal hemorrhoid banding.    Exam as noted above. Large left lower  quadrant parastomal hernia.    Will proceed with colostomy takedown and parastomal hernia repair a scheduled on 06/21/2019.    I have discussed the procedure at length with Kyle Abel.  We discussed the rationale, risks, benefits, and alternatives in depth.  We discussed the expected outcomes and potential complications including but not limited to Possible diverting ileostomy, bleeding, infection, anastomotic leak, recurrence, prolonged pain, need for further procedures, altered continence, incisional hernia, post-operative sexual dysfunction, post-operative bladder dysfunction and injury to adjacent organs.  He verbalized his understanding of the procedure and wishes to proceed.  Written consent was obtained.    Discussed smoking cessation.      Samir Guidry MD, FACS, FASCRS  Senior Staff Surgeon  Department of Colon & Rectal Surgery

## 2019-05-27 NOTE — H&P (VIEW-ONLY)
[facility]  General Surgery  History & Physical    Patient Name: Kyle Abel  MRN: [id number]  Admission Date: (Not on file)  Attending Physician: Dr. Guidry  Primary Care Provider: Gilma Cuba NP    Patient information was obtained from patient and ER records.     Subjective:     Chief Complaint/Reason for Admission: Take down of Momin's    History of Present Illness:    Mr. Abel is a 45 yo M with PMH of HTN and Hemorrhoids who recently underwent an Ex lap and Johny's procedure for Complicated diverticulitis not responsive to medical management on 2/1/2019 with Dr. Guidry who presents to be evaluated for Ostomy take down.    Since surgery patient has been doing well with his colostomy bag. Has been doing well, walking around and tolerating a diet. He now has a large parastomal hernia.     He continues to smoke 1 PPD.     He had a prior colonoscopy on 2017 and was found to have an 8mm ascending adenoma polyp removed and 4mm hyperplastic polyp removed from sigmoid, internal hemorrhoids.    No history of Colon or rectal cancer  Father has UC.     Current Outpatient Medications on File Prior to Visit   Medication Sig    fluticasone (FLONASE) 50 mcg/actuation nasal spray 2 sprays (100 mcg total) by Each Nare route once daily.     No current facility-administered medications on file prior to visit.        Review of patient's allergies indicates:   Allergen Reactions    Penicillins Anaphylaxis and Swelling     As a child age 11       Past Medical History:   Diagnosis Date    Colon polyps 2017    repeat colonoscopy in 5 years    Hemorrhoid     Hypertension     Diagnosed @ 1 1/2 years ago and medication was prescribed - patient reports he did not take medication and does not want treatment for blood pressure    Internal hemorrhoid, bleeding     Rectal bleed      Past Surgical History:   Procedure Laterality Date    CHOLECYSTECTOMY      COLECTOMY, SIGMOID, Colostomy N/A 2/1/2019     Performed by [facility]yr [facility] at [facility] OR 2ND FLR    COLONOSCOPY N/A 2017    Performed by Chapin Hernandez [facility]     Family History     Problem Relation (Age of Onset)    No Known Problems Mother, Brother    Ulcerative colitis Father (57)        Tobacco Use    Smoking status: Former Smoker     Packs/day: 1.50     Years: 25.00     Pack years: 37.50     Types: Cigarettes     Last attempt to quit: 6/10/2018     Years since quittin.9    Smokeless tobacco: Current User     Types: Chew   Substance and Sexual Activity    Alcohol use: Yes     Comment: every weekend - 4 drinks either beer or vodka per occasion    Drug use: No    Sexual activity: Yes     Review of Systems   Constitutional: Negative for chills and fever.   HENT: Negative for congestion and sinus pressure.    Eyes: Negative for visual disturbance.   Respiratory: Negative for cough and shortness of breath.    Cardiovascular: Negative for chest pain and palpitations.   Gastrointestinal: Negative for abdominal distention, abdominal pain, anal bleeding, blood in stool, constipation, diarrhea, nausea, rectal pain and vomiting.   Endocrine: Negative for cold intolerance and heat intolerance.   Genitourinary: Negative for dysuria and frequency.   Musculoskeletal: Negative for arthralgias and back pain.   Skin: Negative for rash.   Allergic/Immunologic: Negative for immunocompromised state.   Neurological: Negative for dizziness, light-headedness and headaches.   Psychiatric/Behavioral: Negative for confusion. The patient is not nervous/anxious.               Objective:     Vital Signs (Most Recent):  Pulse: 72 (19 0913)  BP: (!) 156/89 (19 0913) Vital Signs (24h Range):  [unfilled]     Weight: 133 kg (293 lb 3.4 oz)  Body mass index is 42.07 kg/m².    Physical Exam   Constitutional: He is oriented to person, place, and time. He appears well-developed and well-nourished.   HENT:   Head: Normocephalic and atraumatic.   Eyes:  Conjunctivae are normal.   Pulmonary/Chest: Effort normal. No respiratory distress.   Abdominal: Soft. He exhibits no distension and no mass. There is no tenderness. There is no rebound and no guarding. A hernia ( Large reducible parastomal hernia) is present.   Low midline scar, LLQ colostomy with a reducible hernia   Neurological: He is alert and oriented to person, place, and time.   Skin: Skin is warm and dry. No erythema.       Assessment/Plan:     Mr Abel is a 43 yo M with PMH of HTN and Hemorrhoids who is s/p ex lap and Johny's procedure for complicated diverticulitis,  not responsive to medical management, now with a minimally symptomatic parastomal hernia.    He is scheduled for a laparotomy with colostomy reversal and parastomal hernia repair on 6/21/2019.    - Consent obtained.   - Advised to quit smoking.     Heidi Hunter MD  General Surgery PGY V  Beeper: [phone]    I have interviewed and examined the patient, reviewed the notes and assessments, and/or personally supervised the procedure(s) performed by Dr. Israel France, and I concur with her/his documentation of Kyle Abel.  See below addendum for my evaluation and additional findings.    43 yo M status post Johny's procedure for complicated diverticulitis February 2019, presents today for a preop visit prior to colostomy closure scheduled for 06/21/2019.  He has developed a parastomal hernia which at the present time is minimally symptomatic.  He is eating well with no nausea or vomiting.  He is emptying his ostomy appliance easily and pouching easily.  No abdominal pain.  No fevers or chills.  He had previously quit smoking, but has since resumed smoking 1 pack per day.    He had a colonoscopy in 2017 which revealed 1 small adenoma and 1 hyperplastic polyp which were both removed, as well as diverticulosis and internal hemorrhoids.  He has undergone prior internal hemorrhoid banding.    Exam as noted above. Large left lower  quadrant parastomal hernia.    Will proceed with colostomy takedown and parastomal hernia repair a scheduled on 06/21/2019.    I have discussed the procedure at length with Kyle Abel.  We discussed the rationale, risks, benefits, and alternatives in depth.  We discussed the expected outcomes and potential complications including but not limited to Possible diverting ileostomy, bleeding, infection, anastomotic leak, recurrence, prolonged pain, need for further procedures, altered continence, incisional hernia, post-operative sexual dysfunction, post-operative bladder dysfunction and injury to adjacent organs.  He verbalized his understanding of the procedure and wishes to proceed.  Written consent was obtained.    Discussed smoking cessation.      Samir Guidry MD, FACS, FASCRS  Senior Staff Surgeon  Department of Colon & Rectal Surgery

## 2019-05-28 RX ORDER — NEOMYCIN SULFATE 500 MG/1
TABLET ORAL
Qty: 6 TABLET | Refills: 0 | Status: ON HOLD | OUTPATIENT
Start: 2019-05-28 | End: 2019-06-24 | Stop reason: HOSPADM

## 2019-05-28 RX ORDER — METRONIDAZOLE 500 MG/1
TABLET ORAL
Qty: 3 TABLET | Refills: 0 | Status: ON HOLD | OUTPATIENT
Start: 2019-05-28 | End: 2019-06-24 | Stop reason: HOSPADM

## 2019-06-05 ENCOUNTER — LAB VISIT (OUTPATIENT)
Dept: LAB | Facility: HOSPITAL | Age: 44
End: 2019-06-05
Attending: NURSE PRACTITIONER
Payer: COMMERCIAL

## 2019-06-05 DIAGNOSIS — Z01.818 PREOP TESTING: ICD-10-CM

## 2019-06-05 LAB
ANION GAP SERPL CALC-SCNC: 6 MMOL/L (ref 8–16)
BUN SERPL-MCNC: 13 MG/DL (ref 6–20)
CALCIUM SERPL-MCNC: 9.9 MG/DL (ref 8.7–10.5)
CHLORIDE SERPL-SCNC: 106 MMOL/L (ref 95–110)
CO2 SERPL-SCNC: 27 MMOL/L (ref 23–29)
CREAT SERPL-MCNC: 0.9 MG/DL (ref 0.5–1.4)
ERYTHROCYTE [DISTWIDTH] IN BLOOD BY AUTOMATED COUNT: 14.1 % (ref 11.5–14.5)
EST. GFR  (AFRICAN AMERICAN): >60 ML/MIN/1.73 M^2
EST. GFR  (NON AFRICAN AMERICAN): >60 ML/MIN/1.73 M^2
GLUCOSE SERPL-MCNC: 92 MG/DL (ref 70–110)
HCT VFR BLD AUTO: 49.2 % (ref 40–54)
HGB BLD-MCNC: 15.8 G/DL (ref 14–18)
MCH RBC QN AUTO: 28.3 PG (ref 27–31)
MCHC RBC AUTO-ENTMCNC: 32.1 G/DL (ref 32–36)
MCV RBC AUTO: 88 FL (ref 82–98)
PLATELET # BLD AUTO: 171 K/UL (ref 150–350)
PMV BLD AUTO: 11.2 FL (ref 9.2–12.9)
POTASSIUM SERPL-SCNC: 4.9 MMOL/L (ref 3.5–5.1)
RBC # BLD AUTO: 5.58 M/UL (ref 4.6–6.2)
SODIUM SERPL-SCNC: 139 MMOL/L (ref 136–145)
WBC # BLD AUTO: 5.24 K/UL (ref 3.9–12.7)

## 2019-06-05 PROCEDURE — 85027 COMPLETE CBC AUTOMATED: CPT

## 2019-06-05 PROCEDURE — 80048 BASIC METABOLIC PNL TOTAL CA: CPT

## 2019-06-05 PROCEDURE — 36415 COLL VENOUS BLD VENIPUNCTURE: CPT

## 2019-06-14 ENCOUNTER — OFFICE VISIT (OUTPATIENT)
Dept: FAMILY MEDICINE | Facility: CLINIC | Age: 44
End: 2019-06-14
Payer: COMMERCIAL

## 2019-06-14 VITALS
HEART RATE: 81 BPM | TEMPERATURE: 98 F | DIASTOLIC BLOOD PRESSURE: 78 MMHG | RESPIRATION RATE: 18 BRPM | BODY MASS INDEX: 41.23 KG/M2 | WEIGHT: 288 LBS | HEIGHT: 70 IN | SYSTOLIC BLOOD PRESSURE: 132 MMHG | OXYGEN SATURATION: 98 %

## 2019-06-14 DIAGNOSIS — Z93.3 COLOSTOMY IN PLACE: ICD-10-CM

## 2019-06-14 DIAGNOSIS — F17.210 CIGARETTE SMOKER: ICD-10-CM

## 2019-06-14 DIAGNOSIS — E66.01 CLASS 3 SEVERE OBESITY WITHOUT SERIOUS COMORBIDITY WITH BODY MASS INDEX (BMI) OF 40.0 TO 44.9 IN ADULT, UNSPECIFIED OBESITY TYPE: ICD-10-CM

## 2019-06-14 DIAGNOSIS — Z01.818 PREOPERATIVE GENERAL PHYSICAL EXAMINATION: Primary | ICD-10-CM

## 2019-06-14 PROCEDURE — 3075F SYST BP GE 130 - 139MM HG: CPT | Mod: CPTII,S$GLB,, | Performed by: NURSE PRACTITIONER

## 2019-06-14 PROCEDURE — 3078F DIAST BP <80 MM HG: CPT | Mod: CPTII,S$GLB,, | Performed by: NURSE PRACTITIONER

## 2019-06-14 PROCEDURE — 99999 PR PBB SHADOW E&M-EST. PATIENT-LVL V: CPT | Mod: PBBFAC,,, | Performed by: NURSE PRACTITIONER

## 2019-06-14 PROCEDURE — 93010 ELECTROCARDIOGRAM REPORT: CPT | Mod: S$GLB,,, | Performed by: INTERNAL MEDICINE

## 2019-06-14 PROCEDURE — 99999 PR PBB SHADOW E&M-EST. PATIENT-LVL V: ICD-10-PCS | Mod: PBBFAC,,, | Performed by: NURSE PRACTITIONER

## 2019-06-14 PROCEDURE — 99214 PR OFFICE/OUTPT VISIT, EST, LEVL IV, 30-39 MIN: ICD-10-PCS | Mod: S$GLB,,, | Performed by: NURSE PRACTITIONER

## 2019-06-14 PROCEDURE — 3075F PR MOST RECENT SYSTOLIC BLOOD PRESS GE 130-139MM HG: ICD-10-PCS | Mod: CPTII,S$GLB,, | Performed by: NURSE PRACTITIONER

## 2019-06-14 PROCEDURE — 93010 EKG 12-LEAD: ICD-10-PCS | Mod: S$GLB,,, | Performed by: INTERNAL MEDICINE

## 2019-06-14 PROCEDURE — 99214 OFFICE O/P EST MOD 30 MIN: CPT | Mod: S$GLB,,, | Performed by: NURSE PRACTITIONER

## 2019-06-14 PROCEDURE — 93005 ELECTROCARDIOGRAM TRACING: CPT | Mod: S$GLB,,, | Performed by: NURSE PRACTITIONER

## 2019-06-14 PROCEDURE — 3008F PR BODY MASS INDEX (BMI) DOCUMENTED: ICD-10-PCS | Mod: CPTII,S$GLB,, | Performed by: NURSE PRACTITIONER

## 2019-06-14 PROCEDURE — 93005 EKG 12-LEAD: ICD-10-PCS | Mod: S$GLB,,, | Performed by: NURSE PRACTITIONER

## 2019-06-14 PROCEDURE — 3008F BODY MASS INDEX DOCD: CPT | Mod: CPTII,S$GLB,, | Performed by: NURSE PRACTITIONER

## 2019-06-14 PROCEDURE — 3078F PR MOST RECENT DIASTOLIC BLOOD PRESSURE < 80 MM HG: ICD-10-PCS | Mod: CPTII,S$GLB,, | Performed by: NURSE PRACTITIONER

## 2019-06-14 NOTE — PROGRESS NOTES
Subjective:       Patient ID: Kyle Abel is a 44 y.o. male.    Chief Complaint: Pre-op Exam (surgery on 6/21)    Patient is a 44 year old white male with history of Hypertension that resolved with lifestyle modifications, history of rectal bleeding with internal hemorrhoids and colon polyps seen on colonoscopy, and Acute Diverticulitis on 1/20/2019 that ended with a bowel perforation causing colostomy to be placed in Feb. 2019 that is here today for preoperative exam to have Colostomy Reversal on 6/21/2019.       PRE-OP EKG today:  Normal Sinus Rhythm    Preoperative Labs:  CBC and BMP within acceptable range.    Patient to have go have Preoperative CXR next week due to being a Smoker.  Reports he started smoking 1ppd for the past several months after he had quit since June 2018.      Lab Visit on 06/05/2019   Component Date Value Ref Range Status    WBC 06/05/2019 5.24  3.90 - 12.70 K/uL Final    RBC 06/05/2019 5.58  4.60 - 6.20 M/uL Final    Hemoglobin 06/05/2019 15.8  14.0 - 18.0 g/dL Final    Hematocrit 06/05/2019 49.2  40.0 - 54.0 % Final    Mean Corpuscular Volume 06/05/2019 88  82 - 98 fL Final    Mean Corpuscular Hemoglobin 06/05/2019 28.3  27.0 - 31.0 pg Final    Mean Corpuscular Hemoglobin Conc 06/05/2019 32.1  32.0 - 36.0 g/dL Final    RDW 06/05/2019 14.1  11.5 - 14.5 % Final    Platelets 06/05/2019 171  150 - 350 K/uL Final    MPV 06/05/2019 11.2  9.2 - 12.9 fL Final    Sodium 06/05/2019 139  136 - 145 mmol/L Final    Potassium 06/05/2019 4.9  3.5 - 5.1 mmol/L Final    Chloride 06/05/2019 106  95 - 110 mmol/L Final    CO2 06/05/2019 27  23 - 29 mmol/L Final    Glucose 06/05/2019 92  70 - 110 mg/dL Final    BUN, Bld 06/05/2019 13  6 - 20 mg/dL Final    Creatinine 06/05/2019 0.9  0.5 - 1.4 mg/dL Final    Calcium 06/05/2019 9.9  8.7 - 10.5 mg/dL Final    Anion Gap 06/05/2019 6* 8 - 16 mmol/L Final    eGFR if African American 06/05/2019 >60  >60 mL/min/1.73 m^2 Final    eGFR if  non  06/05/2019 >60  >60 mL/min/1.73 m^2 Final    Comment: Calculation used to obtain the estimated glomerular filtration  rate (eGFR) is the CKD-EPI equation.            Current Outpatient Medications   Medication Sig Dispense Refill    fluticasone (FLONASE) 50 mcg/actuation nasal spray 2 sprays (100 mcg total) by Each Nare route once daily. 16 g 1    metroNIDAZOLE (FLAGYL) 500 MG tablet Take 1 tablet (500 mg total) by mouth As instructed. Take 1 tablet at 1pm, 2pm, 11pm the day before surgery 3 tablet 0    metroNIDAZOLE (FLAGYL) 500 MG tablet On the day before your surgery, take 1 tablet (500 mg) by mouth at 1pm, 2pm and 11pm. 3 tablet 0    neomycin (MYCIFRADIN) 500 mg Tab Take 2 tablets (1,000 mg total) by mouth As instructed. Take 2 tablets at 1pm, 2pm, 11pm the day before surgery 6 tablet 0    neomycin (MYCIFRADIN) 500 mg Tab On the day before your surgery, take 2 tablets (1,000 mg) by mouth at 1pm, 2pm and 11pm. 6 tablet 0     No current facility-administered medications for this visit.        Past Medical History:   Diagnosis Date    Colon polyps 2017    repeat colonoscopy in 5 years    Hemorrhoid     Hypertension     Diagnosed @ 1 1/2 years ago and medication was prescribed - patient reports he did not take medication and does not want treatment for blood pressure    Internal hemorrhoid, bleeding     Rectal bleed        Past Surgical History:   Procedure Laterality Date    CHOLECYSTECTOMY      COLECTOMY, SIGMOID, Colostomy N/A 2/1/2019    Performed [facility] at [facility] OR 2ND FLR    COLONOSCOPY N/A 9/6/2017    Performed by Chapin Hernandez MD at [facility] ENDO       Family History   Problem Relation Age of Onset    No Known Problems Mother     Ulcerative colitis Father 57        intestines     No Known Problems Brother     Anesthesia problems Neg Hx        Social History     Socioeconomic History    Marital status:      Spouse name: Not on file    Number of  children: Not on file    Years of education: Not on file    Highest education level: Not on file   Occupational History    Occupation:      Employer: [employer]    Social Needs    Financial resource strain: Somewhat hard    Food insecurity:     Worry: Sometimes true     Inability: Never true    Transportation needs:     Medical: No     Non-medical: No   Tobacco Use    Smoking status: Current Every Day Smoker     Packs/day: 1.50     Years: 25.00     Pack years: 37.50     Types: Cigarettes    Smokeless tobacco: Current User     Types: Chew    Tobacco comment: quit from 6/21/2018 to 4/1/2019 but now smoking again 1 ppd; had quit for 5 years from 2011 to 2016   Substance and Sexual Activity    Alcohol use: Yes     Frequency: 2-4 times a month     Drinks per session: 5 or 6     Binge frequency: Less than monthly     Comment: every weekend - 4 drinks either beer or vodka per occasion    Drug use: No    Sexual activity: Yes   Lifestyle    Physical activity:     Days per week: 5 days     Minutes per session: 40 min    Stress: Only a little   Relationships    Social connections:     Talks on phone: Twice a week     Gets together: Twice a week     Attends [employer] service: Not on file     Active member of club or organization: No     Attends meetings of clubs or organizations: Never     Relationship status: Living with partner   Other Topics Concern    Not on file   Social History Narrative    Not on file       Review of Systems   Constitutional: Negative for activity change and unexpected weight change.   HENT: Negative for hearing loss, rhinorrhea and trouble swallowing.    Eyes: Negative for discharge and visual disturbance.   Respiratory: Negative for chest tightness and wheezing.    Cardiovascular: Negative for chest pain and palpitations.   Gastrointestinal: Negative for blood in stool, constipation, diarrhea and vomiting.   Endocrine: Negative for polydipsia and polyuria.  "  Genitourinary: Negative for difficulty urinating, hematuria and urgency.   Musculoskeletal: Negative for arthralgias, joint swelling and neck pain.   Neurological: Negative for weakness and headaches.   Psychiatric/Behavioral: Negative for confusion and dysphoric mood.         Objective:     Vitals:    06/14/19 0931 06/14/19 1013   BP: 138/78 132/78   Pulse: 81    Resp: 18    Temp: 98.3 °F (36.8 °C)    TempSrc: Oral    SpO2: 98%    Weight: 130.6 kg (288 lb)    Height: 5' 10" (1.778 m)           Physical Exam   Constitutional: He is oriented to person, place, and time. He appears well-developed and well-nourished.   + obesity with Body mass index is 41.32 kg/m².     HENT:   Head: Normocephalic.   Right Ear: External ear normal.   Left Ear: External ear normal.   Mouth/Throat: No oropharyngeal exudate or posterior oropharyngeal edema.   Eyes: Pupils are equal, round, and reactive to light. EOM are normal. Right eye exhibits no discharge. Left eye exhibits no discharge. No scleral icterus.   Neck: Normal range of motion. Neck supple. No tracheal deviation present. No thyromegaly present.   Cardiovascular: Normal rate, regular rhythm and normal heart sounds.   No murmur heard.  Pulmonary/Chest: Effort normal and breath sounds normal. No respiratory distress.   Abdominal: Soft. He exhibits no distension.   Colostomy bag to LLQ in place   Musculoskeletal: Normal range of motion. He exhibits no edema.   Lymphadenopathy:     He has no cervical adenopathy.   Neurological: He is alert and oriented to person, place, and time. Coordination normal.   Skin: Skin is warm and dry. No rash noted.   Psychiatric: He has a normal mood and affect. His behavior is normal.         Assessment:         ICD-10-CM ICD-9-CM   1. Preoperative general physical examination Z01.818 V72.83   2. Colostomy in place Z93.3 V44.3   3. Cigarette smoker F17.210 305.1   4. Class 3 severe obesity without serious comorbidity with body mass index (BMI) of " 40.0 to 44.9 in adult, unspecified obesity type E66.01 278.01    Z68.41 V85.41       Plan:       Preoperative general physical examination  -  CBC, BMP okay  -  EKG NSR  -  AWAIT CXR RESULTS due to patient current smoker - once CXR clear - will send surgical clearance letter to his provider.  Patient plans to have CXR early next week.  -     X-Ray Chest PA And Lateral; Future; Expected date: 06/14/2019  -     IN OFFICE EKG 12-LEAD (to Muse)    Colostomy in place    Cigarette smoker  -  Smoking 1 ppd for the past 2 months - had quit since June 2018 prior to the past 2 months but does have an extensive smoking history    Class 3 severe obesity without serious comorbidity with body mass index (BMI) of 40.0 to 44.9 in adult, unspecified obesity type  -  Advised to work on lifestyle modifications to promote weight loss.      ####ADDENDUM 6/18/2019 at 7:15AM:  CXR so no acute abnormalities present.  Patient is medically cleared for surgery######     Narrative     EXAMINATION:  XR CHEST PA AND LATERAL    CLINICAL HISTORY:  Encounter for other preprocedural examination    TECHNIQUE:  PA and lateral views of the chest were performed.    COMPARISON:  10/01/2012    FINDINGS:  Cardiac silhouette and mediastinal contours are normal.  Lungs are clear.  Osseous structures are intact.      Impression       No acute cardiopulmonary process.      Electronically signed by: Gilbert Rich MD  Date: 06/17/2019       Patient's Medications   New Prescriptions    No medications on file   Previous Medications    FLUTICASONE (FLONASE) 50 MCG/ACTUATION NASAL SPRAY    2 sprays (100 mcg total) by Each Nare route once daily.    METRONIDAZOLE (FLAGYL) 500 MG TABLET    Take 1 tablet (500 mg total) by mouth As instructed. Take 1 tablet at 1pm, 2pm, 11pm the day before surgery    METRONIDAZOLE (FLAGYL) 500 MG TABLET    On the day before your surgery, take 1 tablet (500 mg) by mouth at 1pm, 2pm and 11pm.    NEOMYCIN (MYCIFRADIN) 500 MG TAB    Take  2 tablets (1,000 mg total) by mouth As instructed. Take 2 tablets at 1pm, 2pm, 11pm the day before surgery    NEOMYCIN (MYCIFRADIN) 500 MG TAB    On the day before your surgery, take 2 tablets (1,000 mg) by mouth at 1pm, 2pm and 11pm.   Modified Medications    No medications on file   Discontinued Medications    No medications on file

## 2019-06-17 ENCOUNTER — HOSPITAL ENCOUNTER (OUTPATIENT)
Dept: RADIOLOGY | Facility: HOSPITAL | Age: 44
Discharge: HOME | End: 2019-06-17
Attending: NURSE PRACTITIONER
Payer: COMMERCIAL

## 2019-06-17 DIAGNOSIS — Z01.818 PREOPERATIVE GENERAL PHYSICAL EXAMINATION: ICD-10-CM

## 2019-06-17 PROCEDURE — 71046 XR CHEST PA AND LATERAL: ICD-10-PCS | Mod: 26,,, | Performed by: RADIOLOGY

## 2019-06-17 PROCEDURE — 71046 X-RAY EXAM CHEST 2 VIEWS: CPT | Mod: 26,,, | Performed by: RADIOLOGY

## 2019-06-17 PROCEDURE — 71046 X-RAY EXAM CHEST 2 VIEWS: CPT | Mod: TC,PO

## 2019-06-19 ENCOUNTER — NURSE TRIAGE (OUTPATIENT)
Dept: ADMINISTRATIVE | Facility: CLINIC | Age: 44
End: 2019-06-19

## 2019-06-19 NOTE — TELEPHONE ENCOUNTER
Reason for Disposition   [1] Caller requesting NON-URGENT health information AND [2] PCP's office is the best resource    Protocols used: INFORMATION ONLY CALL-A-AH  Having surgery Friday. Might need scope  Beforehand. Urgent Office message sent to colon surgery. Call back with questions

## 2019-06-20 ENCOUNTER — TELEPHONE (OUTPATIENT)
Dept: SURGERY | Facility: CLINIC | Age: 44
End: 2019-06-20

## 2019-06-21 ENCOUNTER — ANESTHESIA (OUTPATIENT)
Dept: SURGERY | Facility: HOSPITAL | Age: 44
DRG: 333 | End: 2019-06-21
Payer: COMMERCIAL

## 2019-06-21 ENCOUNTER — HOSPITAL ENCOUNTER (INPATIENT)
Facility: HOSPITAL | Age: 44
LOS: 3 days | Discharge: HOME | DRG: 333 | End: 2019-06-24
Attending: COLON & RECTAL SURGERY | Admitting: COLON & RECTAL SURGERY
Payer: COMMERCIAL

## 2019-06-21 DIAGNOSIS — Z43.3 COLOSTOMY CARE: ICD-10-CM

## 2019-06-21 DIAGNOSIS — K57.32 SIGMOID DIVERTICULITIS: ICD-10-CM

## 2019-06-21 DIAGNOSIS — Z43.3 ATTENTION TO COLOSTOMY: Primary | ICD-10-CM

## 2019-06-21 LAB
ABO + RH BLD: NORMAL
BLD GP AB SCN CELLS X3 SERPL QL: NORMAL

## 2019-06-21 PROCEDURE — 88307 TISSUE EXAM BY PATHOLOGIST: CPT | Mod: 26,,, | Performed by: PATHOLOGY

## 2019-06-21 PROCEDURE — 25000003 PHARM REV CODE 250: Performed by: NURSE ANESTHETIST, CERTIFIED REGISTERED

## 2019-06-21 PROCEDURE — 63600175 PHARM REV CODE 636 W HCPCS: Performed by: ANESTHESIOLOGY

## 2019-06-21 PROCEDURE — 63600175 PHARM REV CODE 636 W HCPCS: Performed by: NURSE ANESTHETIST, CERTIFIED REGISTERED

## 2019-06-21 PROCEDURE — 25000003 PHARM REV CODE 250: Performed by: STUDENT IN AN ORGANIZED HEALTH CARE EDUCATION/TRAINING PROGRAM

## 2019-06-21 PROCEDURE — 63600175 PHARM REV CODE 636 W HCPCS: Performed by: STUDENT IN AN ORGANIZED HEALTH CARE EDUCATION/TRAINING PROGRAM

## 2019-06-21 PROCEDURE — D9220A PRA ANESTHESIA: Mod: CRNA,,, | Performed by: NURSE ANESTHETIST, CERTIFIED REGISTERED

## 2019-06-21 PROCEDURE — C9290 INJ, BUPIVACAINE LIPOSOME: HCPCS | Performed by: COLON & RECTAL SURGERY

## 2019-06-21 PROCEDURE — C1729 CATH, DRAINAGE: HCPCS | Performed by: COLON & RECTAL SURGERY

## 2019-06-21 PROCEDURE — C1765 ADHESION BARRIER: HCPCS | Performed by: COLON & RECTAL SURGERY

## 2019-06-21 PROCEDURE — 88304 TISSUE EXAM BY PATHOLOGIST: CPT | Mod: 26,,, | Performed by: PATHOLOGY

## 2019-06-21 PROCEDURE — 63600175 PHARM REV CODE 636 W HCPCS: Performed by: NURSE PRACTITIONER

## 2019-06-21 PROCEDURE — 63600175 PHARM REV CODE 636 W HCPCS: Performed by: COLON & RECTAL SURGERY

## 2019-06-21 PROCEDURE — 86850 RBC ANTIBODY SCREEN: CPT

## 2019-06-21 PROCEDURE — 88307 TISSUE SPECIMEN TO PATHOLOGY - SURGERY: ICD-10-PCS | Mod: 26,,, | Performed by: PATHOLOGY

## 2019-06-21 PROCEDURE — 27201423 OPTIME MED/SURG SUP & DEVICES STERILE SUPPLY: Performed by: COLON & RECTAL SURGERY

## 2019-06-21 PROCEDURE — 94761 N-INVAS EAR/PLS OXIMETRY MLT: CPT

## 2019-06-21 PROCEDURE — D9220A PRA ANESTHESIA: ICD-10-PCS | Mod: ANES,,, | Performed by: ANESTHESIOLOGY

## 2019-06-21 PROCEDURE — 88307 TISSUE EXAM BY PATHOLOGIST: CPT | Performed by: PATHOLOGY

## 2019-06-21 PROCEDURE — 36000709 HC OR TIME LEV III EA ADD 15 MIN: Performed by: COLON & RECTAL SURGERY

## 2019-06-21 PROCEDURE — D9220A PRA ANESTHESIA: Mod: ANES,,, | Performed by: ANESTHESIOLOGY

## 2019-06-21 PROCEDURE — 44626 PR CLOSE ENTEROSTOMY,RESEC+COLOREC ANAS: ICD-10-PCS | Mod: ,,, | Performed by: COLON & RECTAL SURGERY

## 2019-06-21 PROCEDURE — 71000039 HC RECOVERY, EACH ADD'L HOUR: Performed by: COLON & RECTAL SURGERY

## 2019-06-21 PROCEDURE — 25000003 PHARM REV CODE 250: Performed by: NURSE PRACTITIONER

## 2019-06-21 PROCEDURE — S0030 INJECTION, METRONIDAZOLE: HCPCS | Performed by: NURSE PRACTITIONER

## 2019-06-21 PROCEDURE — 44626 REPAIR BOWEL OPENING: CPT | Mod: ,,, | Performed by: COLON & RECTAL SURGERY

## 2019-06-21 PROCEDURE — D9220A PRA ANESTHESIA: ICD-10-PCS | Mod: CRNA,,, | Performed by: NURSE ANESTHETIST, CERTIFIED REGISTERED

## 2019-06-21 PROCEDURE — 37000008 HC ANESTHESIA 1ST 15 MINUTES: Performed by: COLON & RECTAL SURGERY

## 2019-06-21 PROCEDURE — 88304 TISSUE SPECIMEN TO PATHOLOGY - SURGERY: ICD-10-PCS | Mod: 26,,, | Performed by: PATHOLOGY

## 2019-06-21 PROCEDURE — 71000033 HC RECOVERY, INTIAL HOUR: Performed by: COLON & RECTAL SURGERY

## 2019-06-21 PROCEDURE — 37000009 HC ANESTHESIA EA ADD 15 MINS: Performed by: COLON & RECTAL SURGERY

## 2019-06-21 PROCEDURE — 20600001 HC STEP DOWN PRIVATE ROOM

## 2019-06-21 PROCEDURE — 27000221 HC OXYGEN, UP TO 24 HOURS

## 2019-06-21 PROCEDURE — 36000708 HC OR TIME LEV III 1ST 15 MIN: Performed by: COLON & RECTAL SURGERY

## 2019-06-21 DEVICE — MEMBRANE SEPRAFILM 5 X 6: Type: IMPLANTABLE DEVICE | Site: ABDOMEN | Status: FUNCTIONAL

## 2019-06-21 RX ORDER — MIDAZOLAM HYDROCHLORIDE 1 MG/ML
INJECTION, SOLUTION INTRAMUSCULAR; INTRAVENOUS
Status: DISCONTINUED | OUTPATIENT
Start: 2019-06-21 | End: 2019-06-21

## 2019-06-21 RX ORDER — ONDANSETRON 2 MG/ML
INJECTION INTRAMUSCULAR; INTRAVENOUS
Status: DISCONTINUED | OUTPATIENT
Start: 2019-06-21 | End: 2019-06-21

## 2019-06-21 RX ORDER — LIDOCAINE HCL/PF 100 MG/5ML
SYRINGE (ML) INTRAVENOUS
Status: DISCONTINUED | OUTPATIENT
Start: 2019-06-21 | End: 2019-06-21

## 2019-06-21 RX ORDER — ONDANSETRON 2 MG/ML
4 INJECTION INTRAMUSCULAR; INTRAVENOUS EVERY 12 HOURS PRN
Status: DISCONTINUED | OUTPATIENT
Start: 2019-06-21 | End: 2019-06-24 | Stop reason: HOSPADM

## 2019-06-21 RX ORDER — TRAMADOL HYDROCHLORIDE 50 MG/1
50 TABLET ORAL EVERY 6 HOURS PRN
Status: DISCONTINUED | OUTPATIENT
Start: 2019-06-21 | End: 2019-06-24 | Stop reason: HOSPADM

## 2019-06-21 RX ORDER — OXYCODONE HYDROCHLORIDE 5 MG/1
5 TABLET ORAL EVERY 6 HOURS PRN
Status: DISCONTINUED | OUTPATIENT
Start: 2019-06-21 | End: 2019-06-24 | Stop reason: HOSPADM

## 2019-06-21 RX ORDER — MUPIROCIN 20 MG/G
OINTMENT TOPICAL
Status: ACTIVE | OUTPATIENT
Start: 2019-06-21

## 2019-06-21 RX ORDER — HYDROMORPHONE HYDROCHLORIDE 1 MG/ML
0.2 INJECTION, SOLUTION INTRAMUSCULAR; INTRAVENOUS; SUBCUTANEOUS EVERY 5 MIN PRN
Status: DISCONTINUED | OUTPATIENT
Start: 2019-06-21 | End: 2019-06-21 | Stop reason: HOSPADM

## 2019-06-21 RX ORDER — ACETAMINOPHEN 500 MG
1000 TABLET ORAL
Status: ACTIVE | OUTPATIENT
Start: 2019-06-21 | End: 2019-06-22

## 2019-06-21 RX ORDER — SODIUM CHLORIDE 9 MG/ML
INJECTION, SOLUTION INTRAVENOUS CONTINUOUS
Status: DISCONTINUED | OUTPATIENT
Start: 2019-06-21 | End: 2019-06-22

## 2019-06-21 RX ORDER — KETAMINE HCL IN 0.9 % NACL 50 MG/5 ML
SYRINGE (ML) INTRAVENOUS
Status: DISCONTINUED | OUTPATIENT
Start: 2019-06-21 | End: 2019-06-21

## 2019-06-21 RX ORDER — MUPIROCIN 20 MG/G
1 OINTMENT TOPICAL 2 TIMES DAILY
Status: DISCONTINUED | OUTPATIENT
Start: 2019-06-21 | End: 2019-06-24 | Stop reason: HOSPADM

## 2019-06-21 RX ORDER — ACETAMINOPHEN 500 MG
1000 TABLET ORAL
Status: COMPLETED | OUTPATIENT
Start: 2019-06-21 | End: 2019-06-21

## 2019-06-21 RX ORDER — METRONIDAZOLE 500 MG/100ML
500 INJECTION, SOLUTION INTRAVENOUS
Status: ACTIVE | OUTPATIENT
Start: 2019-06-21

## 2019-06-21 RX ORDER — HEPARIN SODIUM 5000 [USP'U]/ML
5000 INJECTION, SOLUTION INTRAVENOUS; SUBCUTANEOUS
Status: ACTIVE | OUTPATIENT
Start: 2019-06-21

## 2019-06-21 RX ORDER — ROCURONIUM BROMIDE 10 MG/ML
INJECTION, SOLUTION INTRAVENOUS
Status: DISCONTINUED | OUTPATIENT
Start: 2019-06-21 | End: 2019-06-21

## 2019-06-21 RX ORDER — DEXAMETHASONE SODIUM PHOSPHATE 4 MG/ML
INJECTION, SOLUTION INTRA-ARTICULAR; INTRALESIONAL; INTRAMUSCULAR; INTRAVENOUS; SOFT TISSUE
Status: DISCONTINUED | OUTPATIENT
Start: 2019-06-21 | End: 2019-06-21

## 2019-06-21 RX ORDER — PROPOFOL 10 MG/ML
VIAL (ML) INTRAVENOUS
Status: DISCONTINUED | OUTPATIENT
Start: 2019-06-21 | End: 2019-06-21

## 2019-06-21 RX ORDER — ACETAMINOPHEN 500 MG
1000 TABLET ORAL EVERY 8 HOURS
Status: DISCONTINUED | OUTPATIENT
Start: 2019-06-22 | End: 2019-06-24 | Stop reason: HOSPADM

## 2019-06-21 RX ORDER — ACETAMINOPHEN 10 MG/ML
1000 INJECTION, SOLUTION INTRAVENOUS EVERY 8 HOURS
Status: COMPLETED | OUTPATIENT
Start: 2019-06-21 | End: 2019-06-22

## 2019-06-21 RX ORDER — HEPARIN SODIUM 5000 [USP'U]/ML
5000 INJECTION, SOLUTION INTRAVENOUS; SUBCUTANEOUS
Status: COMPLETED | OUTPATIENT
Start: 2019-06-21 | End: 2019-06-21

## 2019-06-21 RX ORDER — OXYCODONE HYDROCHLORIDE 10 MG/1
10 TABLET ORAL EVERY 4 HOURS PRN
Status: DISCONTINUED | OUTPATIENT
Start: 2019-06-21 | End: 2019-06-24 | Stop reason: HOSPADM

## 2019-06-21 RX ORDER — IBUPROFEN 400 MG/1
800 TABLET ORAL EVERY 8 HOURS
Status: DISCONTINUED | OUTPATIENT
Start: 2019-06-22 | End: 2019-06-24 | Stop reason: HOSPADM

## 2019-06-21 RX ORDER — SUCCINYLCHOLINE CHLORIDE 20 MG/ML
INJECTION INTRAMUSCULAR; INTRAVENOUS
Status: DISCONTINUED | OUTPATIENT
Start: 2019-06-21 | End: 2019-06-21

## 2019-06-21 RX ORDER — GLYCOPYRROLATE 0.2 MG/ML
INJECTION INTRAMUSCULAR; INTRAVENOUS
Status: DISCONTINUED | OUTPATIENT
Start: 2019-06-21 | End: 2019-06-21

## 2019-06-21 RX ORDER — LIDOCAINE HYDROCHLORIDE ANHYDROUS AND DEXTROSE MONOHYDRATE .8; 5 G/100ML; G/100ML
INJECTION, SOLUTION INTRAVENOUS CONTINUOUS PRN
Status: DISCONTINUED | OUTPATIENT
Start: 2019-06-21 | End: 2019-06-21

## 2019-06-21 RX ORDER — FENTANYL CITRATE 50 UG/ML
INJECTION, SOLUTION INTRAMUSCULAR; INTRAVENOUS
Status: DISCONTINUED | OUTPATIENT
Start: 2019-06-21 | End: 2019-06-21

## 2019-06-21 RX ORDER — SODIUM CHLORIDE 0.9 % (FLUSH) 0.9 %
10 SYRINGE (ML) INJECTION
Status: DISCONTINUED | OUTPATIENT
Start: 2019-06-21 | End: 2019-06-21 | Stop reason: HOSPADM

## 2019-06-21 RX ORDER — GABAPENTIN 300 MG/1
300 CAPSULE ORAL ONCE
Status: COMPLETED | OUTPATIENT
Start: 2019-06-21 | End: 2019-06-21

## 2019-06-21 RX ORDER — GABAPENTIN 300 MG/1
300 CAPSULE ORAL 3 TIMES DAILY
Status: DISCONTINUED | OUTPATIENT
Start: 2019-06-21 | End: 2019-06-24 | Stop reason: HOSPADM

## 2019-06-21 RX ORDER — METRONIDAZOLE 500 MG/100ML
500 INJECTION, SOLUTION INTRAVENOUS
Status: COMPLETED | OUTPATIENT
Start: 2019-06-21 | End: 2019-06-21

## 2019-06-21 RX ORDER — SODIUM CHLORIDE 0.9 % (FLUSH) 0.9 %
10 SYRINGE (ML) INJECTION
Status: DISCONTINUED | OUTPATIENT
Start: 2019-06-21 | End: 2019-06-24 | Stop reason: HOSPADM

## 2019-06-21 RX ORDER — MUPIROCIN 20 MG/G
OINTMENT TOPICAL
Status: DISPENSED | OUTPATIENT
Start: 2019-06-21

## 2019-06-21 RX ADMIN — MUPIROCIN: 20 OINTMENT TOPICAL at 08:06

## 2019-06-21 RX ADMIN — SUCCINYLCHOLINE CHLORIDE 160 MG: 20 INJECTION, SOLUTION INTRAMUSCULAR; INTRAVENOUS at 08:06

## 2019-06-21 RX ADMIN — Medication 10 MG: at 10:06

## 2019-06-21 RX ADMIN — HEPARIN SODIUM 5000 UNITS: 5000 INJECTION, SOLUTION INTRAVENOUS; SUBCUTANEOUS at 08:06

## 2019-06-21 RX ADMIN — IBUPROFEN 400 MG: 800 INJECTION INTRAVENOUS at 08:06

## 2019-06-21 RX ADMIN — ONDANSETRON 4 MG: 2 INJECTION INTRAMUSCULAR; INTRAVENOUS at 09:06

## 2019-06-21 RX ADMIN — OXYCODONE HYDROCHLORIDE 10 MG: 10 TABLET ORAL at 04:06

## 2019-06-21 RX ADMIN — FENTANYL CITRATE 100 MCG: 50 INJECTION, SOLUTION INTRAMUSCULAR; INTRAVENOUS at 08:06

## 2019-06-21 RX ADMIN — ROCURONIUM BROMIDE 20 MG: 10 INJECTION, SOLUTION INTRAVENOUS at 09:06

## 2019-06-21 RX ADMIN — METRONIDAZOLE 500 MG: 500 SOLUTION INTRAVENOUS at 09:06

## 2019-06-21 RX ADMIN — DEXAMETHASONE SODIUM PHOSPHATE 8 MG: 4 INJECTION, SOLUTION INTRAMUSCULAR; INTRAVENOUS at 09:06

## 2019-06-21 RX ADMIN — GABAPENTIN 300 MG: 300 CAPSULE ORAL at 02:06

## 2019-06-21 RX ADMIN — HYDROMORPHONE HYDROCHLORIDE 0.2 MG: 1 INJECTION, SOLUTION INTRAMUSCULAR; INTRAVENOUS; SUBCUTANEOUS at 01:06

## 2019-06-21 RX ADMIN — IBUPROFEN 800 MG: 800 INJECTION INTRAVENOUS at 09:06

## 2019-06-21 RX ADMIN — SUGAMMADEX 254 MG: 100 INJECTION, SOLUTION INTRAVENOUS at 12:06

## 2019-06-21 RX ADMIN — GENTAMICIN SULFATE 474 MG: 40 INJECTION, SOLUTION INTRAMUSCULAR; INTRAVENOUS at 09:06

## 2019-06-21 RX ADMIN — SODIUM CHLORIDE, SODIUM GLUCONATE, SODIUM ACETATE, POTASSIUM CHLORIDE, MAGNESIUM CHLORIDE, SODIUM PHOSPHATE, DIBASIC, AND POTASSIUM PHOSPHATE: .53; .5; .37; .037; .03; .012; .00082 INJECTION, SOLUTION INTRAVENOUS at 09:06

## 2019-06-21 RX ADMIN — ACETAMINOPHEN 1000 MG: 10 INJECTION, SOLUTION INTRAVENOUS at 09:06

## 2019-06-21 RX ADMIN — Medication 40 MG: at 09:06

## 2019-06-21 RX ADMIN — PROPOFOL 200 MG: 10 INJECTION, EMULSION INTRAVENOUS at 08:06

## 2019-06-21 RX ADMIN — OXYCODONE HYDROCHLORIDE 10 MG: 10 TABLET ORAL at 12:06

## 2019-06-21 RX ADMIN — LIDOCAINE HYDROCHLORIDE 0.02 MG/KG/MIN: 8 INJECTION, SOLUTION INTRAVENOUS at 09:06

## 2019-06-21 RX ADMIN — ROCURONIUM BROMIDE 40 MG: 10 INJECTION, SOLUTION INTRAVENOUS at 09:06

## 2019-06-21 RX ADMIN — ROCURONIUM BROMIDE 10 MG: 10 INJECTION, SOLUTION INTRAVENOUS at 10:06

## 2019-06-21 RX ADMIN — ACETAMINOPHEN 1000 MG: 500 TABLET ORAL at 08:06

## 2019-06-21 RX ADMIN — MIDAZOLAM HYDROCHLORIDE 2 MG: 1 INJECTION, SOLUTION INTRAMUSCULAR; INTRAVENOUS at 08:06

## 2019-06-21 RX ADMIN — ACETAMINOPHEN 1000 MG: 10 INJECTION, SOLUTION INTRAVENOUS at 04:06

## 2019-06-21 RX ADMIN — IBUPROFEN 800 MG: 800 INJECTION INTRAVENOUS at 02:06

## 2019-06-21 RX ADMIN — SODIUM CHLORIDE: 0.9 INJECTION, SOLUTION INTRAVENOUS at 08:06

## 2019-06-21 RX ADMIN — GLYCOPYRROLATE 0.2 MG: 0.2 INJECTION, SOLUTION INTRAMUSCULAR; INTRAVENOUS at 08:06

## 2019-06-21 RX ADMIN — MUPIROCIN 1 G: 20 OINTMENT TOPICAL at 09:06

## 2019-06-21 RX ADMIN — GABAPENTIN 300 MG: 300 CAPSULE ORAL at 08:06

## 2019-06-21 RX ADMIN — GABAPENTIN 300 MG: 300 CAPSULE ORAL at 09:06

## 2019-06-21 RX ADMIN — LIDOCAINE HYDROCHLORIDE 100 MG: 20 INJECTION, SOLUTION INTRAVENOUS at 08:06

## 2019-06-21 RX ADMIN — SODIUM CHLORIDE, SODIUM GLUCONATE, SODIUM ACETATE, POTASSIUM CHLORIDE, MAGNESIUM CHLORIDE, SODIUM PHOSPHATE, DIBASIC, AND POTASSIUM PHOSPHATE: .53; .5; .37; .037; .03; .012; .00082 INJECTION, SOLUTION INTRAVENOUS at 11:06

## 2019-06-21 RX ADMIN — ROCURONIUM BROMIDE 10 MG: 10 INJECTION, SOLUTION INTRAVENOUS at 08:06

## 2019-06-21 NOTE — ANESTHESIA POSTPROCEDURE EVALUATION
Anesthesia Post Evaluation    Patient: Kyle Abel    Procedure(s) Performed: Procedure(s) (LRB):  COLOSTOMY-REVERSAL (N/A)    Final Anesthesia Type: general  Patient location during evaluation: PACU  Patient participation: Yes- Able to Participate  Level of consciousness: awake and alert  Post-procedure vital signs: reviewed and stable  Pain management: adequate  Airway patency: patent  PONV status at discharge: No PONV  Anesthetic complications: no      Cardiovascular status: blood pressure returned to baseline  Respiratory status: unassisted and spontaneous ventilation  Hydration status: euvolemic  Follow-up not needed.          Vitals Value Taken Time   /70 6/21/2019  5:02 PM   Temp 36.7 °C (98 °F) 6/21/2019  5:00 PM   Pulse 74 6/21/2019  5:11 PM   Resp 23 6/21/2019  5:11 PM   SpO2 95 % 6/21/2019  5:11 PM   Vitals shown include unvalidated device data.      No case tracking events are documented in the log.      Pain/Daniel Score: Pain Rating Prior to Med Admin: 5 (6/21/2019  4:57 PM)  Pain Rating Post Med Admin: 3 (6/21/2019  4:26 PM)  Daniel Score: 10 (6/21/2019  2:30 PM)

## 2019-06-21 NOTE — TRANSFER OF CARE
"Anesthesia Transfer of Care Note    Patient: Kyle Abel    Procedure(s) Performed: Procedure(s) (LRB):  COLOSTOMY-REVERSAL (N/A)    Patient location: PACU    Anesthesia Type: general    Transport from OR: Transported from OR on 6-10 L/min O2 by face mask with adequate spontaneous ventilation    Post pain: adequate analgesia    Post assessment: no apparent anesthetic complications and tolerated procedure well    Post vital signs: stable    Level of consciousness: awake    Nausea/Vomiting: no nausea/vomiting    Complications: none    Transfer of care protocol was followed      Last vitals:   Visit Vitals  /70 (BP Location: Left arm, Patient Position: Lying)   Pulse 77   Temp 36.1 °C (97 °F) (Oral)   Resp 18   Ht 5' 10" (1.778 m)   Wt 127 kg (280 lb)   SpO2 (!) 92%   BMI 40.18 kg/m²     "

## 2019-06-21 NOTE — BRIEF OP NOTE
[facility]  Brief Operative Note    SUMMARY     Surgery Date: 6/21/2019     Surgeon(s) and Role:     * Samir Guidry MD - Primary     * Piotr Evans MD - Resident - Assisting        Pre-Operative Care:  Pre-operative bowel prep Mechanical and PO antibiotics  IV antibiotics given within 1 hour of incision? Yes  Preoperative multimodal pain control? Orfirmev, Calador and TAP    Pre-op Diagnosis:  Colostomy in place [Z93.3]    Operative Care:  Post-op Diagnosis: Post-Op Diagnosis Codes:     * Colostomy in place [Z93.3]    Procedure(s) (LRB):  COLOSTOMY-REVERSAL (N/A)    Anesthesia: General  Total volume administered .   Total UOP: .  Anesthesia Start: .  Anesthesia Stop: .    Technical Procedures Used:   1. Colostomy takedown, colorectal anastomosis   2. RENATA  3. Parastomal hernia repair    Use of closing instrument setup? Yes  Gown/Glove Change @ time of closing? Yes  Suction tip change at time of closing? Yes  Wound Protector used if open case? Yes    Description of the findings of the procedure:   1. Colostomy, parastomal hernia  2. Colorectal anastomosis, neg leak test    Wound Class (Clean-contaminated    Complications: no     Estimated Blood Loss: * No values recorded between 6/21/2019  9:29 AM and 6/21/2019 12:11 PM * 50cc          Specimens:   Specimen (12h ago, onward)    Start     Ordered    06/21/19 1008  Specimen to Pathology - Surgery  Once     Comments:  1. Colostomy - Permanent     Start Status     06/21/19 1008 Needs to be Collected Order ID: [id number]       06/21/19 1008          Implants:   Implant Name Type Inv. Item Serial No.  Lot No. LRB No. Used   MEMBRANE SEPRAFILM 5 X 6 - [id number]  MEMBRANE SEPRAFILM 5 X 6  [employer] WILLIAN [id number] N/A 2       Post-Operative Care:         Disposition: PACU - hemodynamically stable.           Condition: Good  PT Temp >36 upon leaving the OR? Yes

## 2019-06-21 NOTE — NURSING
Pt arrived to floor from PACU and walked from stretcher in hallway to bed in the room. Stated pain 3/10. Using IS. SCDs/TEDs in place. AVSS. RA. ABD ML to gauze. L sided  stoma takedown site to gauze. L side JOSE drain to bulb suction. Abdominal binder on. No telemetry. No accuchecks. 18 G L hand to NS @ 70. 18 G R hand SL. No c/o N/V, CP, or SOB. No acute events or injury. WCTM.

## 2019-06-22 LAB
ANION GAP SERPL CALC-SCNC: 9 MMOL/L (ref 8–16)
BASOPHILS # BLD AUTO: 0.02 K/UL (ref 0–0.2)
BASOPHILS NFR BLD: 0.2 % (ref 0–1.9)
BUN SERPL-MCNC: 10 MG/DL (ref 6–20)
CALCIUM SERPL-MCNC: 8.8 MG/DL (ref 8.7–10.5)
CHLORIDE SERPL-SCNC: 101 MMOL/L (ref 95–110)
CO2 SERPL-SCNC: 25 MMOL/L (ref 23–29)
CREAT SERPL-MCNC: 1.1 MG/DL (ref 0.5–1.4)
DIFFERENTIAL METHOD: ABNORMAL
EOSINOPHIL # BLD AUTO: 0 K/UL (ref 0–0.5)
EOSINOPHIL NFR BLD: 0.2 % (ref 0–8)
ERYTHROCYTE [DISTWIDTH] IN BLOOD BY AUTOMATED COUNT: 14.4 % (ref 11.5–14.5)
EST. GFR  (AFRICAN AMERICAN): >60 ML/MIN/1.73 M^2
EST. GFR  (NON AFRICAN AMERICAN): >60 ML/MIN/1.73 M^2
GLUCOSE SERPL-MCNC: 94 MG/DL (ref 70–110)
HCT VFR BLD AUTO: 45.4 % (ref 40–54)
HGB BLD-MCNC: 14.9 G/DL (ref 14–18)
IMM GRANULOCYTES # BLD AUTO: 0.08 K/UL (ref 0–0.04)
IMM GRANULOCYTES NFR BLD AUTO: 0.6 % (ref 0–0.5)
LYMPHOCYTES # BLD AUTO: 1.7 K/UL (ref 1–4.8)
LYMPHOCYTES NFR BLD: 13.5 % (ref 18–48)
MAGNESIUM SERPL-MCNC: 2.1 MG/DL (ref 1.6–2.6)
MCH RBC QN AUTO: 28.3 PG (ref 27–31)
MCHC RBC AUTO-ENTMCNC: 32.8 G/DL (ref 32–36)
MCV RBC AUTO: 86 FL (ref 82–98)
MONOCYTES # BLD AUTO: 1.5 K/UL (ref 0.3–1)
MONOCYTES NFR BLD: 12.4 % (ref 4–15)
NEUTROPHILS # BLD AUTO: 9.1 K/UL (ref 1.8–7.7)
NEUTROPHILS NFR BLD: 73.1 % (ref 38–73)
NRBC BLD-RTO: 0 /100 WBC
PHOSPHATE SERPL-MCNC: 3.9 MG/DL (ref 2.7–4.5)
PLATELET # BLD AUTO: 185 K/UL (ref 150–350)
PMV BLD AUTO: 11.4 FL (ref 9.2–12.9)
POCT GLUCOSE: 96 MG/DL (ref 70–110)
POTASSIUM SERPL-SCNC: 4.3 MMOL/L (ref 3.5–5.1)
RBC # BLD AUTO: 5.27 M/UL (ref 4.6–6.2)
SODIUM SERPL-SCNC: 135 MMOL/L (ref 136–145)
WBC # BLD AUTO: 12.38 K/UL (ref 3.9–12.7)

## 2019-06-22 PROCEDURE — 80048 BASIC METABOLIC PNL TOTAL CA: CPT

## 2019-06-22 PROCEDURE — 63600175 PHARM REV CODE 636 W HCPCS: Performed by: STUDENT IN AN ORGANIZED HEALTH CARE EDUCATION/TRAINING PROGRAM

## 2019-06-22 PROCEDURE — 20600001 HC STEP DOWN PRIVATE ROOM

## 2019-06-22 PROCEDURE — 84100 ASSAY OF PHOSPHORUS: CPT

## 2019-06-22 PROCEDURE — 25000003 PHARM REV CODE 250: Performed by: STUDENT IN AN ORGANIZED HEALTH CARE EDUCATION/TRAINING PROGRAM

## 2019-06-22 PROCEDURE — 85025 COMPLETE CBC W/AUTO DIFF WBC: CPT

## 2019-06-22 PROCEDURE — 36415 COLL VENOUS BLD VENIPUNCTURE: CPT

## 2019-06-22 PROCEDURE — 83735 ASSAY OF MAGNESIUM: CPT

## 2019-06-22 RX ORDER — ENOXAPARIN SODIUM 100 MG/ML
40 INJECTION SUBCUTANEOUS EVERY 24 HOURS
Status: DISCONTINUED | OUTPATIENT
Start: 2019-06-22 | End: 2019-06-24 | Stop reason: HOSPADM

## 2019-06-22 RX ORDER — HYDROMORPHONE HYDROCHLORIDE 1 MG/ML
1 INJECTION, SOLUTION INTRAMUSCULAR; INTRAVENOUS; SUBCUTANEOUS EVERY 4 HOURS PRN
Status: DISCONTINUED | OUTPATIENT
Start: 2019-06-22 | End: 2019-06-24 | Stop reason: HOSPADM

## 2019-06-22 RX ORDER — SODIUM CHLORIDE 9 MG/ML
INJECTION, SOLUTION INTRAVENOUS CONTINUOUS
Status: DISCONTINUED | OUTPATIENT
Start: 2019-06-22 | End: 2019-06-23

## 2019-06-22 RX ADMIN — GABAPENTIN 300 MG: 300 CAPSULE ORAL at 08:06

## 2019-06-22 RX ADMIN — SODIUM CHLORIDE 500 ML: 0.9 INJECTION, SOLUTION INTRAVENOUS at 09:06

## 2019-06-22 RX ADMIN — GABAPENTIN 300 MG: 300 CAPSULE ORAL at 02:06

## 2019-06-22 RX ADMIN — OXYCODONE HYDROCHLORIDE 10 MG: 10 TABLET ORAL at 05:06

## 2019-06-22 RX ADMIN — IBUPROFEN 800 MG: 400 TABLET, FILM COATED ORAL at 02:06

## 2019-06-22 RX ADMIN — SODIUM CHLORIDE: 0.9 INJECTION, SOLUTION INTRAVENOUS at 09:06

## 2019-06-22 RX ADMIN — HYDROMORPHONE HYDROCHLORIDE 1 MG: 1 INJECTION, SOLUTION INTRAMUSCULAR; INTRAVENOUS; SUBCUTANEOUS at 02:06

## 2019-06-22 RX ADMIN — HYDROMORPHONE HYDROCHLORIDE 1 MG: 1 INJECTION, SOLUTION INTRAMUSCULAR; INTRAVENOUS; SUBCUTANEOUS at 08:06

## 2019-06-22 RX ADMIN — ONDANSETRON 4 MG: 2 INJECTION INTRAMUSCULAR; INTRAVENOUS at 08:06

## 2019-06-22 RX ADMIN — IBUPROFEN 800 MG: 400 TABLET, FILM COATED ORAL at 08:06

## 2019-06-22 RX ADMIN — ACETAMINOPHEN 1000 MG: 500 TABLET ORAL at 02:06

## 2019-06-22 RX ADMIN — ACETAMINOPHEN 1000 MG: 500 TABLET ORAL at 08:06

## 2019-06-22 RX ADMIN — HYDROMORPHONE HYDROCHLORIDE 1 MG: 1 INJECTION, SOLUTION INTRAMUSCULAR; INTRAVENOUS; SUBCUTANEOUS at 09:06

## 2019-06-22 RX ADMIN — OXYCODONE HYDROCHLORIDE 10 MG: 10 TABLET ORAL at 12:06

## 2019-06-22 RX ADMIN — GABAPENTIN 300 MG: 300 CAPSULE ORAL at 09:06

## 2019-06-22 RX ADMIN — MUPIROCIN 1 G: 20 OINTMENT TOPICAL at 09:06

## 2019-06-22 RX ADMIN — IBUPROFEN 800 MG: 800 INJECTION INTRAVENOUS at 05:06

## 2019-06-22 RX ADMIN — ENOXAPARIN SODIUM 40 MG: 100 INJECTION SUBCUTANEOUS at 05:06

## 2019-06-22 RX ADMIN — ACETAMINOPHEN 1000 MG: 10 INJECTION, SOLUTION INTRAVENOUS at 05:06

## 2019-06-22 NOTE — PROGRESS NOTES
[facility]  Colorectal Surgery  Progress Note    Patient Name: Kyle Abel  MRN: [id number]  Admission Date: 6/21/2019  Hospital Length of Stay: 1 days  Attending Physician: Samir Guidry MD  PTA Medications   Medication Sig    metroNIDAZOLE (FLAGYL) 500 MG tablet Take 1 tablet (500 mg total) by mouth As instructed. Take 1 tablet at 1pm, 2pm, 11pm the day before surgery    neomycin (MYCIFRADIN) 500 mg Tab Take 2 tablets (1,000 mg total) by mouth As instructed. Take 2 tablets at 1pm, 2pm, 11pm the day before surgery    fluticasone (FLONASE) 50 mcg/actuation nasal spray 2 sprays (100 mcg total) by Each Nare route once daily.    metroNIDAZOLE (FLAGYL) 500 MG tablet On the day before your surgery, take 1 tablet (500 mg) by mouth at 1pm, 2pm and 11pm.    neomycin (MYCIFRADIN) 500 mg Tab On the day before your surgery, take 2 tablets (1,000 mg) by mouth at 1pm, 2pm and 11pm.       Review of patient's allergies indicates:   Allergen Reactions    Penicillins Anaphylaxis and Swelling     As a child age 11       Past Medical History:   Diagnosis Date    Colon polyps 2017    repeat colonoscopy in 5 years    Hemorrhoid     Hypertension     Diagnosed @ 1 1/2 years ago and medication was prescribed - patient reports he did not take medication and does not want treatment for blood pressure    Internal hemorrhoid, bleeding     Rectal bleed      Past Surgical History:   Procedure Laterality Date    CHOLECYSTECTOMY      COLECTOMY, SIGMOID, Colostomy N/A 2/1/2019    Performed [facility] OR [address] [place] [address]    COLONOSCOPY N/A 9/6/2017    Performed by Chapin Hernandez MD at [facility] ENDO     Family History     Problem Relation (Age of Onset)    No Known Problems Mother, Brother    Ulcerative colitis Father (57)        Tobacco Use    Smoking status: Current Every Day Smoker     Packs/day: 1.50     Years: 25.00     Pack years: 37.50     Types: Cigarettes    Smokeless tobacco: Current  User     Types: Chew    Tobacco comment: quit from 6/21/2018 to 4/1/2019 but now smoking again 1 ppd; had quit for 5 years from 2011 to 2016   Substance and Sexual Activity    Alcohol use: Yes     Frequency: 2-4 times a month     Drinks per session: 5 or 6     Binge frequency: Less than monthly     Comment: every weekend - 4 drinks either beer or vodka per occasion    Drug use: No    Sexual activity: Yes     Subj: Pt states pain was not well controlled overnight.  No bowel function yet. HDS      Review of Systems  Objective:     Vital Signs (Most Recent):  Temp: 97.4 °F (36.3 °C) (06/22/19 0918)  Pulse: 74 (06/22/19 0918)  Resp: 20 (06/22/19 0918)  BP: 123/72 (06/22/19 0918)  SpO2: (!) 93 % (06/22/19 0918) Vital Signs (24h Range):  Temp:  [96.3 °F (35.7 °C)-98.7 °F (37.1 °C)] 97.4 °F (36.3 °C)  Pulse:  [61-84] 74  Resp:  [15-24] 20  SpO2:  [92 %-100 %] 93 %  BP: ()/(58-87) 123/72     Weight: 127 kg (280 lb)  Body mass index is 40.18 kg/m².    Physical Exam   Constitutional: He is oriented to person, place, and time. He appears well-developed and well-nourished.   HENT:   Head: Normocephalic and atraumatic.   Eyes: Right eye exhibits no discharge. Left eye exhibits no discharge.   Neck: Normal range of motion. Neck supple.   Cardiovascular: Normal rate and regular rhythm.   Pulmonary/Chest: Effort normal. No respiratory distress.   Musculoskeletal: Normal range of motion.   Neurological: He is alert and oriented to person, place, and time.   Skin: Skin is warm and dry.   Psychiatric: He has a normal mood and affect. His behavior is normal.   Vitals reviewed.      Significant Labs:  BMP (Last 3 Results):   Recent Labs   Lab 06/22/19  0526   GLU 94   *   K 4.3      CO2 25   BUN 10   CREATININE 1.1   CALCIUM 8.8   MG 2.1     CBC (Last 3 Results):   Recent Labs   Lab 06/22/19  0526   WBC 12.38   RBC 5.27   HGB 14.9   HCT 45.4      MCV 86   MCH 28.3   MCHC 32.8       Significant  Diagnostics:  I have reviewed all pertinent imaging results/findings within the past 24 hours.    Assessment/Plan:     Colostomy care  Kyle Abel is a 44 y.o. male had ostomy takedown on 6/21    -Pain regiment changed  -Low res diet  -Lovenox  -Giving 500 bolus for bump in creatinine          Franko Subramanian MD  Colorectal Surgery  [facility]

## 2019-06-22 NOTE — ASSESSMENT & PLAN NOTE
Kyle Brijesh Abel is a 44 y.o. male had ostomy takedown on 6/21    -Pain regiment changed  -Low res diet  -Lovenox  -Giving 500 bolus for bump in creatinine

## 2019-06-22 NOTE — PLAN OF CARE
Pt is AAOx3.Pt able to perform all ADL's without assistance. Family at bedside. Mcbride to gravity. Draining clear yellow urine no odor noted. Pt is in good spirits.  Pt is afebrile, po fluids encouraged, TCBD encouraged, hand hygiene encouragedNAD noted.Pt denies pian and/or discomfort at this time.Standard precautions maintained.  Pt remains injury and fall free, non skid footwear donned, call light within reach, personal items within reach, bed in low/locked position, pt able to voice needs all needs voiced have been met at this time.

## 2019-06-22 NOTE — SUBJECTIVE & OBJECTIVE
PTA Medications   Medication Sig    metroNIDAZOLE (FLAGYL) 500 MG tablet Take 1 tablet (500 mg total) by mouth As instructed. Take 1 tablet at 1pm, 2pm, 11pm the day before surgery    neomycin (MYCIFRADIN) 500 mg Tab Take 2 tablets (1,000 mg total) by mouth As instructed. Take 2 tablets at 1pm, 2pm, 11pm the day before surgery    fluticasone (FLONASE) 50 mcg/actuation nasal spray 2 sprays (100 mcg total) by Each Nare route once daily.    metroNIDAZOLE (FLAGYL) 500 MG tablet On the day before your surgery, take 1 tablet (500 mg) by mouth at 1pm, 2pm and 11pm.    neomycin (MYCIFRADIN) 500 mg Tab On the day before your surgery, take 2 tablets (1,000 mg) by mouth at 1pm, 2pm and 11pm.       Review of patient's allergies indicates:   Allergen Reactions    Penicillins Anaphylaxis and Swelling     As a child age 11       Past Medical History:   Diagnosis Date    Colon polyps 2017    repeat colonoscopy in 5 years    Hemorrhoid     Hypertension     Diagnosed @ 1 1/2 years ago and medication was prescribed - patient reports he did not take medication and does not want treatment for blood pressure    Internal hemorrhoid, bleeding     Rectal bleed      Past Surgical History:   Procedure Laterality Date    CHOLECYSTECTOMY      COLECTOMY, SIGMOID, Colostomy N/A 2/1/2019    [facility]    COLONOSCOPY N/A 9/6/2017    Performed by Chapin Hernandez MD at [facility] ENDO     Family History     Problem Relation (Age of Onset)    No Known Problems Mother, Brother    Ulcerative colitis Father (57)        Tobacco Use    Smoking status: Current Every Day Smoker     Packs/day: 1.50     Years: 25.00     Pack years: 37.50     Types: Cigarettes    Smokeless tobacco: Current User     Types: Chew    Tobacco comment: quit from 6/21/2018 to 4/1/2019 but now smoking again 1 ppd; had quit for 5 years from 2011 to 2016   Substance and Sexual Activity    Alcohol use: Yes     Frequency: 2-4 times  a month     Drinks per session: 5 or 6     Binge frequency: Less than monthly     Comment: every weekend - 4 drinks either beer or vodka per occasion    Drug use: No    Sexual activity: Yes     Subj: Pt states pain was not well controlled overnight.  No bowel function yet. HDS      Review of Systems  Objective:     Vital Signs (Most Recent):  Temp: 97.4 °F (36.3 °C) (06/22/19 0918)  Pulse: 74 (06/22/19 0918)  Resp: 20 (06/22/19 0918)  BP: 123/72 (06/22/19 0918)  SpO2: (!) 93 % (06/22/19 0918) Vital Signs (24h Range):  Temp:  [96.3 °F (35.7 °C)-98.7 °F (37.1 °C)] 97.4 °F (36.3 °C)  Pulse:  [61-84] 74  Resp:  [15-24] 20  SpO2:  [92 %-100 %] 93 %  BP: ()/(58-87) 123/72     Weight: 127 kg (280 lb)  Body mass index is 40.18 kg/m².    Physical Exam   Constitutional: He is oriented to person, place, and time. He appears well-developed and well-nourished.   HENT:   Head: Normocephalic and atraumatic.   Eyes: Right eye exhibits no discharge. Left eye exhibits no discharge.   Neck: Normal range of motion. Neck supple.   Cardiovascular: Normal rate and regular rhythm.   Pulmonary/Chest: Effort normal. No respiratory distress.   Musculoskeletal: Normal range of motion.   Neurological: He is alert and oriented to person, place, and time.   Skin: Skin is warm and dry.   Psychiatric: He has a normal mood and affect. His behavior is normal.   Vitals reviewed.      Significant Labs:  BMP (Last 3 Results):   Recent Labs   Lab 06/22/19  0526   GLU 94   *   K 4.3      CO2 25   BUN 10   CREATININE 1.1   CALCIUM 8.8   MG 2.1     CBC (Last 3 Results):   Recent Labs   Lab 06/22/19  0526   WBC 12.38   RBC 5.27   HGB 14.9   HCT 45.4      MCV 86   MCH 28.3   MCHC 32.8       Significant Diagnostics:  I have reviewed all pertinent imaging results/findings within the past 24 hours.

## 2019-06-22 NOTE — PLAN OF CARE
Problem: Adult Inpatient Plan of Care  Goal: Plan of Care Review  Outcome: Ongoing (interventions implemented as appropriate)  AVSS on RA. CPAP HS and during naps. ABD ML to [facility]; ostomy closure site packed with gauze, covered with transparent film. Packing saturated but not leaking. R JOSE to bulb suction. ABD binder in place. Pain controlled with multi-modal agents: tylenol, ibuprofen, oxycodone, dilaudid IV. 18 G to L hand SL. 18 G to R hand infusing NS @ 75mL/hr. Up ad merlyn; ambulates in room. Voids per urinal. No bowel movement since surgery. Compliant with SCDs/TEDs and non-slip socks. Using IS independently. Tolerating approximately 50% of low fiber, low residue diet. Passing flatus, c/o gas pain. No c/o N/V, SOB or CP. No falls or acute events on this shift. WCTM.

## 2019-06-23 PROCEDURE — 20600001 HC STEP DOWN PRIVATE ROOM

## 2019-06-23 PROCEDURE — 63600175 PHARM REV CODE 636 W HCPCS: Performed by: STUDENT IN AN ORGANIZED HEALTH CARE EDUCATION/TRAINING PROGRAM

## 2019-06-23 PROCEDURE — 25000003 PHARM REV CODE 250: Performed by: STUDENT IN AN ORGANIZED HEALTH CARE EDUCATION/TRAINING PROGRAM

## 2019-06-23 RX ADMIN — HYDROMORPHONE HYDROCHLORIDE 1 MG: 1 INJECTION, SOLUTION INTRAMUSCULAR; INTRAVENOUS; SUBCUTANEOUS at 07:06

## 2019-06-23 RX ADMIN — HYDROMORPHONE HYDROCHLORIDE 1 MG: 1 INJECTION, SOLUTION INTRAMUSCULAR; INTRAVENOUS; SUBCUTANEOUS at 03:06

## 2019-06-23 RX ADMIN — HYDROMORPHONE HYDROCHLORIDE 1 MG: 1 INJECTION, SOLUTION INTRAMUSCULAR; INTRAVENOUS; SUBCUTANEOUS at 12:06

## 2019-06-23 RX ADMIN — HYDROMORPHONE HYDROCHLORIDE 1 MG: 1 INJECTION, SOLUTION INTRAMUSCULAR; INTRAVENOUS; SUBCUTANEOUS at 06:06

## 2019-06-23 RX ADMIN — GABAPENTIN 300 MG: 300 CAPSULE ORAL at 09:06

## 2019-06-23 RX ADMIN — IBUPROFEN 800 MG: 400 TABLET, FILM COATED ORAL at 01:06

## 2019-06-23 RX ADMIN — HYDROMORPHONE HYDROCHLORIDE 1 MG: 1 INJECTION, SOLUTION INTRAMUSCULAR; INTRAVENOUS; SUBCUTANEOUS at 10:06

## 2019-06-23 RX ADMIN — OXYCODONE HYDROCHLORIDE 10 MG: 10 TABLET ORAL at 01:06

## 2019-06-23 RX ADMIN — ACETAMINOPHEN 1000 MG: 500 TABLET ORAL at 09:06

## 2019-06-23 RX ADMIN — IBUPROFEN 800 MG: 400 TABLET, FILM COATED ORAL at 05:06

## 2019-06-23 RX ADMIN — MUPIROCIN 1 G: 20 OINTMENT TOPICAL at 11:06

## 2019-06-23 RX ADMIN — ACETAMINOPHEN 1000 MG: 500 TABLET ORAL at 01:06

## 2019-06-23 RX ADMIN — MUPIROCIN 1 G: 20 OINTMENT TOPICAL at 09:06

## 2019-06-23 RX ADMIN — ENOXAPARIN SODIUM 40 MG: 100 INJECTION SUBCUTANEOUS at 06:06

## 2019-06-23 RX ADMIN — OXYCODONE HYDROCHLORIDE 10 MG: 10 TABLET ORAL at 09:06

## 2019-06-23 RX ADMIN — HYDROMORPHONE HYDROCHLORIDE 1 MG: 1 INJECTION, SOLUTION INTRAMUSCULAR; INTRAVENOUS; SUBCUTANEOUS at 11:06

## 2019-06-23 RX ADMIN — GABAPENTIN 300 MG: 300 CAPSULE ORAL at 02:06

## 2019-06-23 RX ADMIN — IBUPROFEN 800 MG: 400 TABLET, FILM COATED ORAL at 09:06

## 2019-06-23 RX ADMIN — ONDANSETRON 4 MG: 2 INJECTION INTRAMUSCULAR; INTRAVENOUS at 09:06

## 2019-06-23 RX ADMIN — ACETAMINOPHEN 1000 MG: 500 TABLET ORAL at 05:06

## 2019-06-23 NOTE — SUBJECTIVE & OBJECTIVE
PTA Medications   Medication Sig    metroNIDAZOLE (FLAGYL) 500 MG tablet Take 1 tablet (500 mg total) by mouth As instructed. Take 1 tablet at 1pm, 2pm, 11pm the day before surgery    neomycin (MYCIFRADIN) 500 mg Tab Take 2 tablets (1,000 mg total) by mouth As instructed. Take 2 tablets at 1pm, 2pm, 11pm the day before surgery    fluticasone (FLONASE) 50 mcg/actuation nasal spray 2 sprays (100 mcg total) by Each Nare route once daily.    metroNIDAZOLE (FLAGYL) 500 MG tablet On the day before your surgery, take 1 tablet (500 mg) by mouth at 1pm, 2pm and 11pm.    neomycin (MYCIFRADIN) 500 mg Tab On the day before your surgery, take 2 tablets (1,000 mg) by mouth at 1pm, 2pm and 11pm.     SUBJ- DID well overnight. Pain controlled.    Review of patient's allergies indicates:   Allergen Reactions    Penicillins Anaphylaxis and Swelling     As a child age 11       Past Medical History:   Diagnosis Date    Colon polyps 2017    repeat colonoscopy in 5 years    Hemorrhoid     Hypertension     Diagnosed @ 1 1/2 years ago and medication was prescribed - patient reports he did not take medication and does not want treatment for blood pressure    Internal hemorrhoid, bleeding     Rectal bleed      Past Surgical History:   Procedure Laterality Date    CHOLECYSTECTOMY      COLECTOMY, SIGMOID, Colostomy N/A 2/1/2019    Performed by Samir Guid[facility] MD at [facility] OR 2ND F[facility]    COLONOSCOPY N/A 9/6/2017    Performed by Chapin Hernandez MD at [facility]     Family History     Problem Relation (Age of Onset)    No Known Problems Mother, Brother    Ulcerative colitis Father (57)        Tobacco Use    Smoking status: Current Every Day Smoker     Packs/day: 1.50     Years: 25.00     Pack years: 37.50     Types: Cigarettes    Smokeless tobacco: Current User     Types: Chew    Tobacco comment: quit from 6/21/2018 to 4/1/2019 but now smoking again 1 ppd; had quit for 5 years from 2011 to 2016   Substance and Sexual Activity     Alcohol use: Yes     Frequency: 2-4 times a month     Drinks per session: 5 or 6     Binge frequency: Less than monthly     Comment: every weekend - 4 drinks either beer or vodka per occasion    Drug use: No    Sexual activity: Yes     Review of Systems  Objective:     Vital Signs (Most Recent):  Temp: 98 °F (36.7 °C) (06/23/19 0747)  Pulse: 66 (06/23/19 0747)  Resp: 14 (06/23/19 0747)  BP: 117/76 (06/23/19 0747)  SpO2: 96 % (06/23/19 0747) Vital Signs (24h Range):  Temp:  [97.4 °F (36.3 °C)-98.4 °F (36.9 °C)] 98 °F (36.7 °C)  Pulse:  [59-82] 66  Resp:  [14-18] 14  SpO2:  [92 %-96 %] 96 %  BP: (101-128)/(68-76) 117/76     Weight: 127 kg (280 lb)  Body mass index is 40.18 kg/m².    Physical Exam   Constitutional: He is oriented to person, place, and time. He appears well-developed and well-nourished.   HENT:   Head: Normocephalic and atraumatic.   Eyes: Right eye exhibits no discharge. Left eye exhibits no discharge.   Neck: Normal range of motion. Neck supple.   Cardiovascular: Normal rate and regular rhythm.   Pulmonary/Chest: Effort normal. No respiratory distress.   Abdominal:   Incision c/d/i  Former stoma site healing well   Musculoskeletal: Normal range of motion.   Neurological: He is alert and oriented to person, place, and time.   Skin: Skin is warm and dry.   Psychiatric: He has a normal mood and affect. His behavior is normal.   Vitals reviewed.      Significant Labs:  BMP (Last 3 Results):   Recent Labs   Lab 06/22/19  0526   GLU 94   *   K 4.3      CO2 25   BUN 10   CREATININE 1.1   CALCIUM 8.8   MG 2.1     CBC (Last 3 Results):   Recent Labs   Lab 06/22/19  0526   WBC 12.38   RBC 5.27   HGB 14.9   HCT 45.4      MCV 86   MCH 28.3   MCHC 32.8       Significant Diagnostics:  I have reviewed all pertinent imaging results/findings within the past 24 hours.

## 2019-06-23 NOTE — PLAN OF CARE
Pt is AAOx3.Pt is ambulatory and independent.Pt able to perform all ADL's without assistance. Prn ondansetron given to pt for nauseaPt denies pian and/or discomfort at this time.No breakdown noted, po fluids encouraged.Standard precautions maintained.  Pt turns independently, pt is aware of bony area and pressure reduction positions V/S stable, within normal limits. O BM. Pt is in good spirits.  Pt remains injury and fall free, non skid footwear donned, call light within reach, personal items within reach, bed in low/locked position, pt able to voice needs all needs voiced have been met at this time.

## 2019-06-23 NOTE — PLAN OF CARE
Problem: Adult Inpatient Plan of Care  Goal: Plan of Care Review  Outcome: Ongoing (interventions implemented as appropriate)  AVSS on RA. ABD ML RON w/ staples.  LLQ Ostomy Closure site covered w/ gauze; c/d/i; ; changed today by MD. R JOSE to bulb suction. Pt wears ABD binder at all times. 20G R FA  placed today, SL.  Pt tolerating low fiber, low residue diet. Ambulates to bathroom and in halls independently. Voids independently to toilet. No BM since surgery but passing flatus. Showered independently today and changed clothes. Compliant w/ TEDs/SCDs when in bed. Uses IS independently. Wears CPAP HS and during naps. Multi-modal pain control w/ Tylenol/Ibuprofen and Hydrocodone PO w/ occasional dilaudid 1 mg IV medication. Stated pain 7/10 at worst; 4/10 at best. Has had family at bedside today and is currently resting alone in room. No c/o N/V, CP or SOB. No falls or injury on this shift. WCTM.

## 2019-06-23 NOTE — OP NOTE
DATE OF PROCEDURE:  06/21/2019.    PREOPERATIVE DIAGNOSES:  1.  Colostomy, status post Johny procedure for perforated diverticulitis.  2.  Parastomal hernia.    POSTOPERATIVE DIAGNOSES:  1.  Colostomy, status post Johny procedure for perforated diverticulitis.  2.  Parastomal hernia.    PROCEDURE:  1.  Colostomy takedown with colorectal anastomosis.  2.  Repair of parastomal hernia fascial defect.    SURGEON:  Samir Guidry M.D.    ASSISTANT:  Dr. Piotr Evans.    ANESTHESIA:  General endotracheal.    IV FLUIDS:  2 liters of crystalloid.    ESTIMATED BLOOD LOSS:  100 mL    URINE OUTPUT:  130 mL.    DRAINS:  A #10 Tino-Irving in the pelvis and a Mcbride catheter.    SPECIMENS:  1.  Colostomy.  2.  Rectum.  3.  Anastomotic rings.  4.  Portion of descending colon.    COMPLICATIONS:  None.    OPERATIVE FINDINGS:  1.  Large left lower quadrant parastomal hernia.  2.  Successful colostomy takedown and colorectal anastomosis without   evidence of extravasation on air leak testing.    INDICATIONS:  The patient is a 44-year-old male who several months ago had   undergone an emergent Johny procedure for perforated diverticulitis.  He   presents today for elective colostomy takedown as well as repair of a large   parastomal fascial defect resulting in a large parastomal hernia.     DESCRIPTION OF PROCEDURE:  The patient was identified, brought to the Operating   Room, placed on the table in a supine position after obtaining informed consent.    Venous sequential stockings were placed.  He was given preoperative   intravenous antibiotics as well as subcutaneous heparin.  After induction of   general endotracheal anesthesia, Mcbride catheter was placed using sterile   technique.  He was repositioned in a modified lithotomy position using Yellofin   stirrups.  The abdomen and perineum were prepped and draped in the usual sterile   fashion.  The left lower quadrant colostomy was covered with a Ray-Alberto sponge   and a  bioclusive dressing.  A midline skin incision was made through the   preexisting scar and carried down to the level of the fascia.  The fascia was   sharply incised to gain access to the peritoneal cavity.  Incision was opened   along its length with electrocautery, taking down adhesions to the anterior   abdominal wall.  Once we entered the abdominal cavity and had taken down all of the   adhesions, we then turned our attention towards mobilizing the colostomy.  A   circumferential incision was made around the ostomy site and this was carried   down through the subcutaneous tissue until we encountered the hernia sac.  The   hernia sac was incised.  The herniated small bowel was mobilized from the abdominal wall and   Delivered back through the trephination.  The end of the colon was stapled with a TA-30 stapler   and transected distal to the staple line.  The resected colostomy was passed   from the field.  We then passed the dirty instruments from the field and changed   gloves.  We completed mobilizing the colostomy from the undersurface of the   abdominal wall, delivering all the hernia contents back into the abdominal   cavity.  An Domenico wound protector was then placed. We then mobilized the   left colon, incising the lateral peritoneal attachments up to the level of the   splenic flexure.  The mesentery was mobilized  medially off of the retroperitoneum.    The small bowel adhesions to the mesentery also were lysed.  At this point, the end of   the colon easily reached the pelvis without any undue tension, so we did not need to   mobilize the splenic flexure.    We then identified the marking sutures on the proximal end of the rectal stump.    These were grasped and retracted anteriorly.  We mobilized the proximal end of   the rectal stump, skeletonizing it to a point approximately 3 to 4 cm distal to   the divided proximal end.  Rectum was stapled at this point with a TA-30 stapler   and transected proximal  to the staple line.  The resected rectum was passed   from the field.    We then chose a point on the descending colon that we would use as our distal   division point on the colon.  The mesentery was skeletonized.  We sharply   divided the marginal artery and identified pulsatile flow through the artery.    The proximal end of the artery was secured with a 2-0 Vicryl tie.  The   mesentery was cleared away from the colon.  At this point, a Furness clamp was   placed across the bowel.  A Monosof suture was placed through the Furness clamp   to create a pursestring suture.  The colon distal to the Furness clamp was   sharply divided and passed from the field as descending colon.  The anvil from a   29 mm powered EEA stapler was then inserted into the open end of the colon and   secured with the pursestring suture.  The stapler was then advanced transanally   to the level of the rectal staple line.  Stapler spike was advanced through the   wall of the rectum adjacent to the staple line and was engaged with the anvil in   the distal end of the colon.  Stapler was closed and fired.  Upon removing the   stapler, anastomotic rings were inspected and noted to be intact.  We then   performed air leak testing using a flexible endoscope.  There was no evidence of   extravasation from the staple line and the staple line appeared intact   circumferentially and hemostatic. The anterior aspect of the anastomosis was  reinforced with interrupted 3-0 vicryl seromuscular sutures.    The abdomen was then copiously irrigated with sterile saline.  Hemostasis was   noted to be adequate.  A #10 Tino-Irving drain was placed through a stab   incision in the right lower quadrant and directed into the pelvis adjacent to   the anastomosis.  This was secured to the skin level with a 3-0 nylon suture.    We then turned our attention towards closing the large fascial defect at the   site of the hernia.  The posterior fascia was mobilized from  the rectus muscle   and the posterior fascia was closed with several interrupted #1 Prolene sutures.    We then turned our attention towards the anterior portion of the fascial   defect.  A small Domenico wound retractor was placed in subcutaneous space to   efface the space and allow for better visualization of the fascia.  The fascia   was mobilized anteriorly and posteriorly away from the underlying rectus muscle   as well as from the overlying subcutaneous fat.  The anterior fascia was then   closed with several interrupted #1 Prolene figure-of-eight sutures.    A TAP block was then performed using Exparel diluted in injectable saline.    Seprafilm was placed between the bowel and the omentum.  The omentum was draped   over top of the bowel and additional Seprafilm was placed between the omentum   and the anterior abdominal wall.    We then changed gowns and gloves and brought the sterile closing tray to the   field.  The midline fascia was closed with a running looped #1 PDS suture.    Subcutaneous tissues were irrigated.  Skin was closed using skin staples.  The   ostomy trephination was partially closed with a 2-0 Vicryl pursestring suture   leaving the central portion of it open.  This was packed with sterile gauze.    Island dressing was placed on the midline incision and the Tino-Irving drain   was connected to bulb suction.    The patient tolerated the procedure well with no complications.  He was   extubated in the Operating Room and taken to Recovery in satisfactory condition.    All needle, instrument and sponge counts were correct at the end of the case.    I was present for the entire procedure.      SOFI  dd: 06/23/2019 06:37:12 (CDT)  td: 06/23/2019 09:34:47 (CDT)  Doc ID   #[id number]  Job ID #[id number]    CC:

## 2019-06-23 NOTE — ASSESSMENT & PLAN NOTE
Kyle Sweeneyanh Abel is a 44 y.o. male had ostomy takedown on 6/21    -Pain regiment changed  -Low res diet  -Lovenox  -Creatinine better today    -D/C tomorrow

## 2019-06-23 NOTE — PROGRESS NOTES
[facility]  Colorectal Surgery  Progress Note    Patient Name: Kyle Abel  MRN: [id number]  Admission Date: 6/21/2019  Hospital Length of Stay: 2 days  Attending Physician: Samir Guidry MD  [facility] Medications   Medication Sig    metroNIDAZOLE (FLAGYL) 500 MG tablet Take 1 tablet (500 mg total) by mouth As instructed. Take 1 tablet at 1pm, 2pm, 11pm the day before surgery    neomycin (MYCIFRADIN) 500 mg Tab Take 2 tablets (1,000 mg total) by mouth As instructed. Take 2 tablets at 1pm, 2pm, 11pm the day before surgery    fluticasone (FLONASE) 50 mcg/actuation nasal spray 2 sprays (100 mcg total) by Each Nare route once daily.    metroNIDAZOLE (FLAGYL) 500 MG tablet On the day before your surgery, take 1 tablet (500 mg) by mouth at 1pm, 2pm and 11pm.    neomycin (MYCIFRADIN) 500 mg Tab On the day before your surgery, take 2 tablets (1,000 mg) by mouth at 1pm, 2pm and 11pm.     SUBJ- DID well overnight. Pain controlled.    Review of patient's allergies indicates:   Allergen Reactions    Penicillins Anaphylaxis and Swelling     As a child age 11       Past Medical History:   Diagnosis Date    Colon polyps 2017    repeat colonoscopy in 5 years    Hemorrhoid     Hypertension     Diagnosed @ 1 1/2 years ago and medication was prescribed - patient reports he did not take medication and does not want treatment for blood pressure    Internal hemorrhoid, bleeding     Rectal bleed      Past Surgical History:   Procedure Laterality Date    CHOLECYSTECTOMY      COLECTOMY, SIGMOID, Colostomy N/A 2/1/2019    Performed by [facility] at [facility] [address] [place] [address]    COLONOSCOPY N/A 9/6/2017    Performed by Chapin Hernandez MD at [facility]     Family History     Problem Relation (Age of Onset)    No Known Problems Mother, Brother    Ulcerative colitis Father (57)        Tobacco Use    Smoking status: Current Every Day Smoker     Packs/day: 1.50     Years: 25.00     Pack years: 37.50     Types:  Cigarettes    Smokeless tobacco: Current User     Types: Chew    Tobacco comment: quit from 6/21/2018 to 4/1/2019 but now smoking again 1 ppd; had quit for 5 years from 2011 to 2016   Substance and Sexual Activity    Alcohol use: Yes     Frequency: 2-4 times a month     Drinks per session: 5 or 6     Binge frequency: Less than monthly     Comment: every weekend - 4 drinks either beer or vodka per occasion    Drug use: No    Sexual activity: Yes     Review of Systems  Objective:     Vital Signs (Most Recent):  Temp: 98 °F (36.7 °C) (06/23/19 0747)  Pulse: 66 (06/23/19 0747)  Resp: 14 (06/23/19 0747)  BP: 117/76 (06/23/19 0747)  SpO2: 96 % (06/23/19 0747) Vital Signs (24h Range):  Temp:  [97.4 °F (36.3 °C)-98.4 °F (36.9 °C)] 98 °F (36.7 °C)  Pulse:  [59-82] 66  Resp:  [14-18] 14  SpO2:  [92 %-96 %] 96 %  BP: (101-128)/(68-76) 117/76     Weight: 127 kg (280 lb)  Body mass index is 40.18 kg/m².    Physical Exam   Constitutional: He is oriented to person, place, and time. He appears well-developed and well-nourished.   HENT:   Head: Normocephalic and atraumatic.   Eyes: Right eye exhibits no discharge. Left eye exhibits no discharge.   Neck: Normal range of motion. Neck supple.   Cardiovascular: Normal rate and regular rhythm.   Pulmonary/Chest: Effort normal. No respiratory distress.   Abdominal:   Incision c/d/i  Former stoma site healing well   Musculoskeletal: Normal range of motion.   Neurological: He is alert and oriented to person, place, and time.   Skin: Skin is warm and dry.   Psychiatric: He has a normal mood and affect. His behavior is normal.   Vitals reviewed.      Significant Labs:  BMP (Last 3 Results):   Recent Labs   Lab 06/22/19  0526   GLU 94   *   K 4.3      CO2 25   BUN 10   CREATININE 1.1   CALCIUM 8.8   MG 2.1     CBC (Last 3 Results):   Recent Labs   Lab 06/22/19  0526   WBC 12.38   RBC 5.27   HGB 14.9   HCT 45.4      MCV 86   MCH 28.3   MCHC 32.8       Significant  Diagnostics:  I have reviewed all pertinent imaging results/findings within the past 24 hours.    Assessment/Plan:     Colostomy care  Kyle Abel is a 44 y.o. male had ostomy takedown on 6/21    -Pain regiment changed  -Low res diet  -Lovenox  -Creatinine better today    -D/C tomorrow          Franko Subramanian MD  Colorectal Surgery  [facility]

## 2019-06-24 ENCOUNTER — PATIENT MESSAGE (OUTPATIENT)
Dept: SURGERY | Facility: CLINIC | Age: 44
End: 2019-06-24

## 2019-06-24 VITALS
BODY MASS INDEX: 40.09 KG/M2 | DIASTOLIC BLOOD PRESSURE: 81 MMHG | HEART RATE: 73 BPM | SYSTOLIC BLOOD PRESSURE: 125 MMHG | RESPIRATION RATE: 16 BRPM | OXYGEN SATURATION: 95 % | HEIGHT: 70 IN | WEIGHT: 280 LBS | TEMPERATURE: 97 F

## 2019-06-24 LAB — CRP SERPL-MCNC: 86.1 MG/L (ref 0–8.2)

## 2019-06-24 PROCEDURE — 25000003 PHARM REV CODE 250: Performed by: STUDENT IN AN ORGANIZED HEALTH CARE EDUCATION/TRAINING PROGRAM

## 2019-06-24 PROCEDURE — 36415 COLL VENOUS BLD VENIPUNCTURE: CPT

## 2019-06-24 PROCEDURE — 86140 C-REACTIVE PROTEIN: CPT

## 2019-06-24 PROCEDURE — 63600175 PHARM REV CODE 636 W HCPCS: Performed by: STUDENT IN AN ORGANIZED HEALTH CARE EDUCATION/TRAINING PROGRAM

## 2019-06-24 RX ORDER — ACETAMINOPHEN 500 MG
500 TABLET ORAL EVERY 6 HOURS PRN
Refills: 0 | COMMUNITY
Start: 2019-06-24 | End: 2019-08-12

## 2019-06-24 RX ORDER — GABAPENTIN 300 MG/1
300 CAPSULE ORAL 3 TIMES DAILY
Qty: 90 CAPSULE | Refills: 11 | Status: SHIPPED | OUTPATIENT
Start: 2019-06-24 | End: 2019-08-12

## 2019-06-24 RX ORDER — DOCUSATE SODIUM 100 MG/1
100 CAPSULE, LIQUID FILLED ORAL 2 TIMES DAILY
Refills: 0 | COMMUNITY
Start: 2019-06-24 | End: 2019-08-12

## 2019-06-24 RX ORDER — HYDROCORTISONE 1 %
CREAM (GRAM) TOPICAL 2 TIMES DAILY
Qty: 85.2 G | Refills: 0 | COMMUNITY
Start: 2019-06-24 | End: 2019-08-12

## 2019-06-24 RX ORDER — HYDROCORTISONE 1 %
CREAM (GRAM) TOPICAL 2 TIMES DAILY
Status: DISCONTINUED | OUTPATIENT
Start: 2019-06-24 | End: 2019-06-24 | Stop reason: HOSPADM

## 2019-06-24 RX ORDER — HYDROMORPHONE HYDROCHLORIDE 2 MG/1
2 TABLET ORAL EVERY 4 HOURS PRN
Qty: 30 TABLET | Refills: 0 | Status: SHIPPED | OUTPATIENT
Start: 2019-06-24 | End: 2019-08-12

## 2019-06-24 RX ORDER — POLYETHYLENE GLYCOL 3350 17 G/17G
17 POWDER, FOR SOLUTION ORAL DAILY PRN
Qty: 238 G | Refills: 0 | Status: SHIPPED | OUTPATIENT
Start: 2019-06-24 | End: 2019-06-26 | Stop reason: SDUPTHER

## 2019-06-24 RX ORDER — IBUPROFEN 400 MG/1
400 TABLET ORAL EVERY 6 HOURS PRN
COMMUNITY
Start: 2019-06-24

## 2019-06-24 RX ADMIN — HYDROMORPHONE HYDROCHLORIDE 1 MG: 1 INJECTION, SOLUTION INTRAMUSCULAR; INTRAVENOUS; SUBCUTANEOUS at 05:06

## 2019-06-24 RX ADMIN — OXYCODONE HYDROCHLORIDE 10 MG: 10 TABLET ORAL at 11:06

## 2019-06-24 RX ADMIN — GABAPENTIN 300 MG: 300 CAPSULE ORAL at 08:06

## 2019-06-24 RX ADMIN — HYDROMORPHONE HYDROCHLORIDE 1 MG: 1 INJECTION, SOLUTION INTRAMUSCULAR; INTRAVENOUS; SUBCUTANEOUS at 09:06

## 2019-06-24 RX ADMIN — IBUPROFEN 800 MG: 400 TABLET, FILM COATED ORAL at 05:06

## 2019-06-24 RX ADMIN — OXYCODONE HYDROCHLORIDE 10 MG: 10 TABLET ORAL at 06:06

## 2019-06-24 RX ADMIN — ACETAMINOPHEN 1000 MG: 500 TABLET ORAL at 05:06

## 2019-06-24 NOTE — SUBJECTIVE & OBJECTIVE
Subjective:     Interval History: No acute events. No BM but is passing gas. Complains of hemorrhoid like pain. JOSE drain with minimal serous output.    Post-Op Info:  Procedure(s) (LRB):  COLOSTOMY-REVERSAL (N/A)   3 Days Post-Op      Medications:  Continuous Infusions:  Scheduled Meds:   acetaminophen  1,000 mg Oral Q8H    enoxaparin  40 mg Subcutaneous Daily    gabapentin  300 mg Oral TID    hydrocortisone   Topical (Top) BID    ibuprofen  800 mg Oral Q8H    mupirocin  1 g Nasal BID     PRN Meds:   metronidazole    And    gentamicin    heparin (porcine)    HYDROmorphone    mupirocin    mupirocin    ondansetron    oxyCODONE    oxyCODONE    sodium chloride 0.9%    traMADol        Objective:     Vital Signs (Most Recent):  Temp: 97.6 °F (36.4 °C) (06/24/19 0529)  Pulse: 77 (06/24/19 0529)  Resp: 18 (06/24/19 0529)  BP: 119/80 (06/24/19 0529)  SpO2: 95 % (06/24/19 0529) Vital Signs (24h Range):  Temp:  [97.6 °F (36.4 °C)-98.5 °F (36.9 °C)] 97.6 °F (36.4 °C)  Pulse:  [66-83] 77  Resp:  [14-18] 18  SpO2:  [94 %-96 %] 95 %  BP: (117-128)/(74-81) 119/80     Intake/Output - Last 3 Shifts       06/22 0700 - 06/23 0659 06/23 0700 - 06/24 0659 06/24 0700 - 06/25 0659    P.O. 1080 1200     I.V. (mL/kg) 1460 (11.5) 212.5 (1.7)     IV Piggyback 500      Total Intake(mL/kg) 3040 (23.9) 1412.5 (11.1)     Urine (mL/kg/hr) 1100 (0.4) 2175 (0.7)     Drains 35 0     Stool 0 0     Total Output 1135 2175     Net +1905 -762.5            Urine Occurrence  1 x     Stool Occurrence 0 x 0 x           Physical Exam   Constitutional: He is oriented to person, place, and time. He appears well-developed and well-nourished.   HENT:   Head: Normocephalic and atraumatic.   Eyes: Right eye exhibits no discharge. Left eye exhibits no discharge.   Neck: Normal range of motion. Neck supple.   Cardiovascular: Normal rate and regular rhythm.   Pulmonary/Chest: Effort normal. No respiratory distress.   Abdominal:   Incision  c/d/i  Former stoma site with dressing in place with serous output   Musculoskeletal: Normal range of motion.   Neurological: He is alert and oriented to person, place, and time.   Skin: Skin is warm and dry.   Psychiatric: He has a normal mood and affect. His behavior is normal.   Vitals reviewed.      Anorectal Exam:  Anal Skin: External hemorrhoids    Significant Labs:  CBC (Last 3 Results):   Recent Labs   Lab 06/22/19  0526   WBC 12.38   RBC 5.27   HGB 14.9   HCT 45.4      MCV 86   MCH 28.3   MCHC 32.8     CMP (Last 3 Results):   Recent Labs   Lab 06/22/19  0526   GLU 94   CALCIUM 8.8   *   K 4.3   CO2 25      BUN 10   CREATININE 1.1       Significant Diagnostics:  I have reviewed all pertinent imaging results/findings within the past 24 hours.

## 2019-06-24 NOTE — DISCHARGE SUMMARY
[facility]  Colorectal Surgery  Discharge Summary      Patient Name: Kyle Abel  MRN: [id number]  Admission Date: 6/21/2019  Hospital Length of Stay: 3 days  Discharge Date and Time:  06/24/2019 10:29 AM  Attending Physician: Samir Guidry MD   Discharging Provider: JANY Meng MD  Primary Care Provider: Gilma Cuba NP     HPI:     I have interviewed and examined the patient, reviewed the notes and assessments, and/or personally supervised the procedure(s) performed by Dr. Israel France, and I concur with her/his documentation of Kyle Abel.  See below addendum for my evaluation and additional findings.     45 yo M status post Johny's procedure for complicated diverticulitis February 2019, presents today for a preop visit prior to colostomy closure scheduled for 06/21/2019.  He has developed a parastomal hernia which at the present time is minimally symptomatic.  He is eating well with no nausea or vomiting.  He is emptying his ostomy appliance easily and pouching easily.  No abdominal pain.  No fevers or chills.  He had previously quit smoking, but has since resumed smoking 1 pack per day.     He had a colonoscopy in 2017 which revealed 1 small adenoma and 1 hyperplastic polyp which were both removed, as well as diverticulosis and internal hemorrhoids.  He has undergone prior internal hemorrhoid banding.    Large left lower quadrant parastomal hernia.     Will proceed with colostomy takedown and parastomal hernia repair a scheduled on 06/21/2019.     I have discussed the procedure at length with Kyle Abel.  We discussed the rationale, risks, benefits, and alternatives in depth.  We discussed the expected outcomes and potential complications including but not limited to Possible diverting ileostomy, bleeding, infection, anastomotic leak, recurrence, prolonged pain, need for further procedures, altered continence, incisional hernia, post-operative sexual  dysfunction, post-operative bladder dysfunction and injury to adjacent organs.  He verbalized his understanding of the procedure and wishes to proceed.  Written consent was obtained.     Discussed smoking cessation.    Procedure(s) (LRB):  COLOSTOMY-REVERSAL (N/A)     Hospital Course:  Admitted following colostomy takedown on 6/21/2019. Tolerated post-op period well. Tolerated diet. Passing gas. Voiding, ambulating. Pain controlled. Noted external hemorrhoids and gave steroid cream.         Significant Diagnostic Studies: see chart    Pending Diagnostic Studies:     None        Final Active Diagnoses:    Diagnosis Date Noted POA    PRINCIPAL PROBLEM:  Colostomy care [Z43.3] 06/21/2019 Not Applicable      Problems Resolved During this Admission:      Discharged Condition: good    Disposition: Home or Self Care    Follow Up:    Patient Instructions:     Call clinic or MD on call for abdominal distension/bloating, nausea or emesis.       Notify your health care provider if you experience any of the following:  increased confusion or weakness     Notify your health care provider if you experience any of the following:  persistent dizziness, light-headedness, or visual disturbances     Notify your health care provider if you experience any of the following:  worsening rash     Notify your health care provider if you experience any of the following:  severe persistent headache     Notify your health care provider if you experience any of the following:  difficulty breathing or increased cough     Notify your health care provider if you experience any of the following:  redness, tenderness, or signs of infection (pain, swelling, redness, odor or green/yellow discharge around incision site)     Notify your health care provider if you experience any of the following:  severe uncontrolled pain     Notify your health care provider if you experience any of the following:  persistent nausea and vomiting or diarrhea     Notify  your health care provider if you experience any of the following:  temperature >100.4     Change dressing (specify)   Order Comments: Dressing change daily if desired or can keep open to air. Clean with soap and water at least daily.     Activity as tolerated     Medications:  Reconciled Home Medications:      Medication List      START taking these medications    acetaminophen 500 MG tablet  Commonly known as:  TYLENOL  Take 1 tablet (500 mg total) by mouth every 6 (six) hours as needed for Pain (alternate with ibuprofen).     docusate sodium 100 MG capsule  Commonly known as:  COLACE  Take 1 capsule (100 mg total) by mouth 2 (two) times daily. Take so long as you are using dilaudid     gabapentin 300 MG capsule  Commonly known as:  NEURONTIN  Take 1 capsule (300 mg total) by mouth 3 (three) times daily.     hydrocortisone 1 % cream  Apply topically 2 (two) times daily.     HYDROmorphone 2 MG tablet  Commonly known as:  DILAUDID  Take 1 tablet (2 mg total) by mouth every 4 (four) hours as needed for Pain.     ibuprofen 400 MG tablet  Commonly known as:  ADVIL,MOTRIN  Take 1 tablet (400 mg total) by mouth every 6 (six) hours as needed for Other (pain). Alternate with tylenol     polyethylene glycol 17 gram/dose powder  Commonly known as:  GLYCOLAX  Take 17 g by mouth daily as needed. Mix in 8 oz of clear liquid        CONTINUE taking these medications    fluticasone propionate 50 mcg/actuation nasal spray  Commonly known as:  FLONASE  2 sprays (100 mcg total) by Each Nare route once daily.        STOP taking these medications    metroNIDAZOLE 500 MG tablet  Commonly known as:  FLAGYL     neomycin 500 mg Tab  Commonly known as:  MYCIFRADIN            W Rikki Meng MD  Colorectal Surgery  [facility]

## 2019-06-24 NOTE — HPI
I have interviewed and examined the patient, reviewed the notes and assessments, and/or personally supervised the procedure(s) performed by Dr. Israel France, and I concur with her/his documentation of Kyle Abel.  See below addendum for my evaluation and additional findings.     43 yo M status post Johny's procedure for complicated diverticulitis February 2019, presents today for a preop visit prior to colostomy closure scheduled for 06/21/2019.  He has developed a parastomal hernia which at the present time is minimally symptomatic.  He is eating well with no nausea or vomiting.  He is emptying his ostomy appliance easily and pouching easily.  No abdominal pain.  No fevers or chills.  He had previously quit smoking, but has since resumed smoking 1 pack per day.     He had a colonoscopy in 2017 which revealed 1 small adenoma and 1 hyperplastic polyp which were both removed, as well as diverticulosis and internal hemorrhoids.  He has undergone prior internal hemorrhoid banding.    Large left lower quadrant parastomal hernia.     Will proceed with colostomy takedown and parastomal hernia repair a scheduled on 06/21/2019.     I have discussed the procedure at length with Kyle Abel.  We discussed the rationale, risks, benefits, and alternatives in depth.  We discussed the expected outcomes and potential complications including but not limited to Possible diverting ileostomy, bleeding, infection, anastomotic leak, recurrence, prolonged pain, need for further procedures, altered continence, incisional hernia, post-operative sexual dysfunction, post-operative bladder dysfunction and injury to adjacent organs.  He verbalized his understanding of the procedure and wishes to proceed.  Written consent was obtained.     Discussed smoking cessation.

## 2019-06-24 NOTE — ASSESSMENT & PLAN NOTE
Kyleelmer Abel is a 44 y.o. male had ostomy takedown on 6/21    -Low res diet  -Passing gas  -Lovenox  -Creatinine better today  -Remove JOSE drain prior to DC    -DC today

## 2019-06-24 NOTE — NURSING
Discharge instructions reviewed with pt. Pt given prescriptions as well as supplies to care for incision and drain site. No further questions at this time.

## 2019-06-24 NOTE — PLAN OF CARE
06/24/19 1107   Post-Acute Status   Post-Acute Authorization Other   Other Status No Post-Acute Service Needs   This SW in communication with . SW will continue to follow for discharge needs and offer support as needed.    No SW needs identified at this time.    Rylie Ferreira LMSW  [facility]  [id number]

## 2019-06-24 NOTE — PLAN OF CARE
Discharge planning assessment obtained from chart as patient discharged prior to CM making rounds.  Planned discharge is home - Plan (A) or home with family - Plan (B).    PCP:  Gilma Cuba NP     Payor:  Payor: BLUE CROSS BLUE SHIELD / Plan: [place] PPO / Product Type: PPO /      Pharmacy:    [facility] - [place], [place] - [address]  [address] [address]  Phone: [phone] Fax: [phone]       06/24/19 1534   Discharge Assessment   Assessment Type Discharge Planning Assessment   Confirmed/corrected address and phone number on facesheet? No   Assessment information obtained from? Medical Record   Expected Length of Stay (days) 2   Communicated expected length of stay with patient/caregiver no   Prior to hospitilization cognitive status: Unable to Assess   Prior to hospitalization functional status: Independent   Current cognitive status: Unable to Assess   Current Functional Status: Independent   Able to Return to Prior Arrangements yes   Is patient able to care for self after discharge? Yes   Who are your caregiver(s) and their phone number(s)? Talib - father [phone]   Patient's perception of discharge disposition home or selfcare   Readmission Within the Last 30 Days no previous admission in last 30 days   Patient currently being followed by outpatient case management? No   Patient currently receives any other outside agency services? No   Equipment Currently Used at Home none   Do you have any problems affording any of your prescribed medications? No   Is the patient taking medications as prescribed? yes   Does the patient have transportation home? Yes   Transportation Anticipated family or friend will provide   Does the patient receive services at the [facility]mad[facility] Clinic? No   Discharge Plan A Home   Discharge Plan B Home with family   DME Needed Upon Discharge  none   Patient/Family in Agreement with Plan yes

## 2019-06-24 NOTE — PLAN OF CARE
Patient discharged home.  The patient does not have any home needs.  Family provided transportation home.      Future Appointments   Date Time Provider Department Center   7/8/2019 11:15 AM Samir Guidry MD [facility]          06/24/19 1537   Final Note   Assessment Type Final Discharge Note   Anticipated Discharge Disposition Home   Discharge plans and expectations educations in teach back method with documentation complete? Yes

## 2019-06-24 NOTE — HOSPITAL COURSE
Admitted following colostomy takedown on 6/21/2019. Tolerated post-op period well. Tolerated diet. Passing gas. Voiding, ambulating. Pain controlled. Noted external hemorrhoids and gave steroid cream.

## 2019-06-24 NOTE — PROGRESS NOTES
[facility]  Colorectal Surgery  Progress Note    Patient Name: Kyle Abel  MRN: [id number]  Admission Date: 6/21/2019  Hospital Length of Stay: 3 days  Attending Physician: Samir Guidry MD    Subjective:     Interval History: No acute events. No BM but is passing gas. Complains of hemorrhoid like pain. JOSE drain with minimal serous output.    Post-Op Info:  Procedure(s) (LRB):  COLOSTOMY-REVERSAL (N/A)   3 Days Post-Op      Medications:  Continuous Infusions:  Scheduled Meds:   acetaminophen  1,000 mg Oral Q8H    enoxaparin  40 mg Subcutaneous Daily    gabapentin  300 mg Oral TID    hydrocortisone   Topical (Top) BID    ibuprofen  800 mg Oral Q8H    mupirocin  1 g Nasal BID     PRN Meds:   metronidazole    And    gentamicin    heparin (porcine)    HYDROmorphone    mupirocin    mupirocin    ondansetron    oxyCODONE    oxyCODONE    sodium chloride 0.9%    traMADol        Objective:     Vital Signs (Most Recent):  Temp: 97.6 °F (36.4 °C) (06/24/19 0529)  Pulse: 77 (06/24/19 0529)  Resp: 18 (06/24/19 0529)  BP: 119/80 (06/24/19 0529)  SpO2: 95 % (06/24/19 0529) Vital Signs (24h Range):  Temp:  [97.6 °F (36.4 °C)-98.5 °F (36.9 °C)] 97.6 °F (36.4 °C)  Pulse:  [66-83] 77  Resp:  [14-18] 18  SpO2:  [94 %-96 %] 95 %  BP: (117-128)/(74-81) 119/80     Intake/Output - Last 3 Shifts       06/22 0700 - 06/23 0659 06/23 0700 - 06/24 0659 06/24 0700 - 06/25 0659    P.O. 1080 1200     I.V. (mL/kg) 1460 (11.5) 212.5 (1.7)     IV Piggyback 500      Total Intake(mL/kg) 3040 (23.9) 1412.5 (11.1)     Urine (mL/kg/hr) 1100 (0.4) 2175 (0.7)     Drains 35 0     Stool 0 0     Total Output 1135 2175     Net +1905 -762.5            Urine Occurrence  1 x     Stool Occurrence 0 x 0 x           Physical Exam   Constitutional: He is oriented to person, place, and time. He appears well-developed and well-nourished.   HENT:   Head: Normocephalic and atraumatic.   Eyes: Right eye exhibits no discharge. Left  eye exhibits no discharge.   Neck: Normal range of motion. Neck supple.   Cardiovascular: Normal rate and regular rhythm.   Pulmonary/Chest: Effort normal. No respiratory distress.   Abdominal:   Incision c/d/i  Former stoma site with dressing in place with serous output   Musculoskeletal: Normal range of motion.   Neurological: He is alert and oriented to person, place, and time.   Skin: Skin is warm and dry.   Psychiatric: He has a normal mood and affect. His behavior is normal.   Vitals reviewed.      Anorectal Exam:  Anal Skin: External hemorrhoids    Significant Labs:  CBC (Last 3 Results):   Recent Labs   Lab 06/22/19  0526   WBC 12.38   RBC 5.27   HGB 14.9   HCT 45.4      MCV 86   MCH 28.3   MCHC 32.8     CMP (Last 3 Results):   Recent Labs   Lab 06/22/19  0526   GLU 94   CALCIUM 8.8   *   K 4.3   CO2 25      BUN 10   CREATININE 1.1       Significant Diagnostics:  I have reviewed all pertinent imaging results/findings within the past 24 hours.    Assessment/Plan:     Colostomy care  Kyle Abel is a 44 y.o. male had ostomy takedown on 6/21    -Low res diet  -Passing gas  -Lovenox  -Creatinine better today  -Remove JOSE drain prior to DC    -DC today          W Rikki Meng MD  Colorectal Surgery  [facility]

## 2019-06-26 ENCOUNTER — TELEPHONE (OUTPATIENT)
Dept: SURGERY | Facility: CLINIC | Age: 44
End: 2019-06-26

## 2019-06-26 ENCOUNTER — PATIENT OUTREACH (OUTPATIENT)
Dept: ADMINISTRATIVE | Facility: CLINIC | Age: 44
End: 2019-06-26

## 2019-06-26 ENCOUNTER — PATIENT MESSAGE (OUTPATIENT)
Dept: SURGERY | Facility: CLINIC | Age: 44
End: 2019-06-26

## 2019-06-26 RX ORDER — POLYETHYLENE GLYCOL 3350 17 G/17G
17 POWDER, FOR SOLUTION ORAL DAILY PRN
Qty: 238 G | Refills: 0 | Status: SHIPPED | OUTPATIENT
Start: 2019-06-26 | End: 2019-08-12

## 2019-06-26 NOTE — TELEPHONE ENCOUNTER
----- Message from Ina Garry sent at 6/26/2019 12:05 PM CDT -----  Contact: Self- [phone]  Chao- pt has an additional question to ask- trying to determine if abdomin binder inhibits his bowel movements- please contact pt at [phone]

## 2019-06-26 NOTE — TELEPHONE ENCOUNTER
Spoke with patient and informed him that he can take the Miralax that he has on hand.  Instructed him to call back if it does not work.

## 2019-06-26 NOTE — TELEPHONE ENCOUNTER
Spoke with patient.  Informed him to take the binder off for a little while but should not be causing a problem with going to the bathroom.

## 2019-06-26 NOTE — PATIENT INSTRUCTIONS
Sepsis   Sepsis occurs when your body responds to bacteremia - the presence of bacteria in your bloodstream. Sepsis can be deadly. Blood pressure may drop and the lungs and kidneys may start to fail. Emergency care for sepsis is crucial   When to Go to the Emergency Department (ED)   Sepsis is a medical emergency. Go to the nearest emergency department if a fever is present with any of these symptoms:   Chills and shaking   Fever or low body temperature (hypothermia)   Rapid heartbeat and breathing; shortness of breath   Nausea or vomiting   Confusion, dizziness   Skin rash   Decreased urination     © 3415-9413 [facility], [address] [place] [address], [place] [address]. All rights reserved. This information is not intended as a substitute for professional medical care. Always follow your healthcare professional's instructions.         Discharge Instructions for Abdominal Surgery  Abdominal surgery is done through an incision in your belly. It may take a few weeks or longer to heal from the surgery. This sheet gives instructions on how to care for yourself once youre home.  Medicines  Here is what to expect:  · You may be prescribed pain medicine. Do not wait until your pain becomes severe before taking the medicine. It may not work as well if you wait too long to take it between doses.  · Most surgeons prescribe stool softeners along with opioid prescriptions. Take these as prescribed.   · You may be prescribed antibiotics to help treat or prevent infection. Be sure to take all of the antibiotics even if you start to feel better.  Diet  Dietary tips include:  · Follow any diet instructions given by your healthcare provider. You may need to start with liquids and then slowly add solid foods back into your diet.   · If you have constipation, your healthcare provider may tell you to add more fiber to your diet. You may also be told to use a laxative or stool softener. These can often be bought over the  counter.  · Drink plenty of fluids.  Activity  Recommendations include the following:  · Rest as often as needed.  · Ask your healthcare provider when you can shower or bathe. Have someone nearby in case you need help.  · Ask your family and friends to help with chores and errands.  · Dont mow the lawn, vacuum, or do any strenuous activities for 4 to 6 weeks.  · Avoid lifting anything over 10 pounds for 4 to 6 weeks.  · Avoid driving until your healthcare provider says its OK.  · Walk as often as you feel able.  · Do the coughing and breathing exercises you were taught in the hospital. If you were given an incentive spirometer to help with breathing, use it as directed. This is important and will help prevent lung infections.   · Ask your healthcare provider when you can return to work.  Incision and drain care  Do's and don'ts include the following:  · Keep your incision clean and dry. Its OK to wash the skin around your incision with mild soap and water.  · If you have a dressing over your incision, change it as you were told. Replace the dressing if it becomes wet or dirty. In most cases, the dressing can be removed after 48 hours.  · If you have a drain, record the amount of drainage daily. You may also need to empty the drain and clean the attached tubing daily. Check with your healthcare provider if you can get your drain wet or if it needs to stay dry at all times.  · Dont sit in a bathtub, pool, or hot tub until your incision is closed and any drains are removed.  · When coughing or sneezing, hold a pillow firmly against your incision with both hands. This is called splinting. Doing this helps protect your incision and decreases belly discomfort.  · Avoid picking, scratching, or pulling at your incision.  · Dont use oils, or creams on your incision. Ask your healthcare provider before using lotions on your incision.  Follow-up  You will have one or more follow-up visits with your healthcare provider.  These are needed to check how well youre healing. Your drain, stitches, or staples may also be removed during these visits.  When to call the healthcare provider  Call your healthcare provider right away if you have any of the following:  · Fever of 100.4°F (38°C) or higher, or as advised by your healthcare provider  · Chest pain or trouble breathing  · Pain or tenderness in the leg  · Increased pain, redness, swelling, bleeding, or foul-smelling drainage at the incision site  · Incision separates or comes apart  · Problems with the drain if you have one  · Pain or hardness in your belly that gets worse or isnt relieved by pain medicine  · Nausea and vomiting that wont go away  · Diarrhea that lasts more than 3 days  · Constipation or inability to pass gas for more than 3 days  · Dark-colored or bloody urine  · Bright red or dark black stools  · Itchy, swollen skin; skin rash   Date Last Reviewed: 7/1/2016  © 7382-4350 [employer], [employer] [address] [place] [address], [place] [address]. All rights reserved. This information is not intended as a substitute for professional medical care. Always follow your healthcare professional's instructions.

## 2019-06-27 ENCOUNTER — PATIENT MESSAGE (OUTPATIENT)
Dept: SURGERY | Facility: CLINIC | Age: 44
End: 2019-06-27

## 2019-06-28 ENCOUNTER — PATIENT MESSAGE (OUTPATIENT)
Dept: SURGERY | Facility: CLINIC | Age: 44
End: 2019-06-28

## 2019-06-28 ENCOUNTER — TELEPHONE (OUTPATIENT)
Dept: SURGERY | Facility: CLINIC | Age: 44
End: 2019-06-28

## 2019-06-28 NOTE — TELEPHONE ENCOUNTER
Spoke with patient. Reports blood on tissue paper this morning. No other evidence of bleeding. Denies fever. Pain 4/10. States has been constipated. Started colace today.

## 2019-07-01 ENCOUNTER — PATIENT MESSAGE (OUTPATIENT)
Dept: SURGERY | Facility: CLINIC | Age: 44
End: 2019-07-01

## 2019-07-08 ENCOUNTER — PATIENT MESSAGE (OUTPATIENT)
Dept: SURGERY | Facility: CLINIC | Age: 44
End: 2019-07-08

## 2019-07-08 ENCOUNTER — OFFICE VISIT (OUTPATIENT)
Dept: SURGERY | Facility: CLINIC | Age: 44
End: 2019-07-08
Payer: COMMERCIAL

## 2019-07-08 VITALS
BODY MASS INDEX: 39.74 KG/M2 | SYSTOLIC BLOOD PRESSURE: 131 MMHG | HEIGHT: 70 IN | DIASTOLIC BLOOD PRESSURE: 79 MMHG | WEIGHT: 277.56 LBS | HEART RATE: 71 BPM

## 2019-07-08 DIAGNOSIS — K94.09 COLOSTOMY HERNIA: ICD-10-CM

## 2019-07-08 DIAGNOSIS — K57.32 SIGMOID DIVERTICULITIS: Primary | ICD-10-CM

## 2019-07-08 PROCEDURE — 99999 PR PBB SHADOW E&M-EST. PATIENT-LVL III: ICD-10-PCS | Mod: PBBFAC,,, | Performed by: COLON & RECTAL SURGERY

## 2019-07-08 PROCEDURE — 99024 POSTOP FOLLOW-UP VISIT: CPT | Mod: S$GLB,,, | Performed by: COLON & RECTAL SURGERY

## 2019-07-08 PROCEDURE — 99024 PR POST-OP FOLLOW-UP VISIT: ICD-10-PCS | Mod: S$GLB,,, | Performed by: COLON & RECTAL SURGERY

## 2019-07-08 PROCEDURE — 99999 PR PBB SHADOW E&M-EST. PATIENT-LVL III: CPT | Mod: PBBFAC,,, | Performed by: COLON & RECTAL SURGERY

## 2019-07-08 NOTE — LETTER
July 20, 2019      Gilma Cuba NP  [address]  [address]  [place] [address]           [facility]  [address]  [facility] [address]  Phone: [phone]          Patient: Kyle Abel   MR Number: [id number]   YOB: 1975   Date of Visit: 7/8/2019       Dear Gilma Cuba NP:    Thank you for referring Kyle Abel to me for evaluation. Attached you will find relevant portions of my assessment and plan of care.    If you have questions, please do not hesitate to call me. I look forward to following Kyle Abel along with you.    Sincerely,    Samir Guidry MD    Enclosure  CC:  No Recipients    If you would like to receive this communication electronically, please contact [email or web address] or [phone] to request more information on [employer] Link access.    For providers and/or their staff who would like to refer a patient to [facility], please contact us through our one-stop-shop provider referral line, [facility] , at [phone].    If you feel you have received this communication in error or would no longer like to receive these types of communications, please e-mail [email or web address]

## 2019-07-20 NOTE — PROGRESS NOTES
CRS Post-operative visit    Visit Info:     Procedure:   1.  Colostomy takedown with colorectal anastomosis.  2.  Repair of parastomal hernia fascial defect.    Date of Procedure: June 21, 2019    Indication: 44-year-old male who several months ago had undergone an emergent Johny procedure for perforated diverticulitis.  He subsequently developed a parastomal hernia.    Date of Discharge: June 24, 2019    Current Status:  Overall doing well.  He is eating with no nausea or vomiting.  He is having some mild constipation but overall has been moving his bowels well. No fevers or chills.    Pathology:   1. Colostomy (excision):  Benign colonic tissue  2. Rectum (resection):  Diverticulosis  Associated reactive changes  3. Descending colon (resection):  No neoplasm identified  Diverticulosis  4. Proximal and distal donuts (excision):  Benign colonic tissue    Physical Exam:  General: White male in NAD   Neuro: aaox4   Respiratory: resps even unlabored  Abdomen:  Soft, nondistended, appropriately tender, well-healed midline incision with staples, left lower quadrant former stoma site nearly completely healed  Extremities: Warm dry and intact      Assessment:  S/p colostomy closure and repair of parastomal hernia defect following prior Johny's procedure for perforated diverticulitis    Plan:  Okay for light activity, hold on unrestricted activity until 3 months postop to minimize risk of hernia recurrence.  Abdominal binder.  Diet as tolerated.  RTO 4 weeks.    Samir Guidry MD, FACS, FASCRS  Senior Staff Surgeon  Department of Colon & Rectal Surgery     This note was created using voice recognition software, and may contain some unrecognized transcriptional errors.

## 2019-07-22 ENCOUNTER — PATIENT MESSAGE (OUTPATIENT)
Dept: SURGERY | Facility: CLINIC | Age: 44
End: 2019-07-22

## 2019-07-29 ENCOUNTER — TELEPHONE (OUTPATIENT)
Dept: SURGERY | Facility: CLINIC | Age: 44
End: 2019-07-29

## 2019-07-29 ENCOUNTER — PATIENT MESSAGE (OUTPATIENT)
Dept: SURGERY | Facility: CLINIC | Age: 44
End: 2019-07-29

## 2019-07-29 NOTE — TELEPHONE ENCOUNTER
Left message for patient informing him of an appointment available for him on Thursday 8/1 with Dr. Gray.  Dr. Guidry said that it looks like a wound granuloma.

## 2019-07-29 NOTE — TELEPHONE ENCOUNTER
----- Message from Ina Castañeda sent at 7/29/2019 11:27 AM CDT -----  Contact: lddd- [phone]  Patient sent a message through [employer]. Patient thinks he has a umbilical hernia. He can be reach at [phone]

## 2019-08-01 ENCOUNTER — OFFICE VISIT (OUTPATIENT)
Dept: SURGERY | Facility: CLINIC | Age: 44
End: 2019-08-01
Payer: COMMERCIAL

## 2019-08-01 VITALS
WEIGHT: 285.25 LBS | HEART RATE: 71 BPM | DIASTOLIC BLOOD PRESSURE: 79 MMHG | BODY MASS INDEX: 40.84 KG/M2 | HEIGHT: 70 IN | SYSTOLIC BLOOD PRESSURE: 133 MMHG

## 2019-08-01 DIAGNOSIS — Z09 POSTOP CHECK: Primary | ICD-10-CM

## 2019-08-01 PROCEDURE — 99024 POSTOP FOLLOW-UP VISIT: CPT | Mod: S$GLB,,, | Performed by: COLON & RECTAL SURGERY

## 2019-08-01 PROCEDURE — 99999 PR PBB SHADOW E&M-EST. PATIENT-LVL III: ICD-10-PCS | Mod: PBBFAC,,, | Performed by: COLON & RECTAL SURGERY

## 2019-08-01 PROCEDURE — 99999 PR PBB SHADOW E&M-EST. PATIENT-LVL III: CPT | Mod: PBBFAC,,, | Performed by: COLON & RECTAL SURGERY

## 2019-08-01 PROCEDURE — 99024 PR POST-OP FOLLOW-UP VISIT: ICD-10-PCS | Mod: S$GLB,,, | Performed by: COLON & RECTAL SURGERY

## 2019-08-01 NOTE — PROGRESS NOTES
HPI:  Kyle Abel is a 44 y.o. male who several months ago had undergone an emergent Johny procedure for perforated diverticulitis.  He subsequently developed a parastomal hernia.  In 6/21 he underwent a Johny takedown without complications.   Now presents to clinic, doing well, no abdominal pain, no nausea, tolerating regular diet.   Yellowish discharge from periumbilical incision, that began 3 days ago, no pruritus or redness around incision.    Small amounts of intermittent discharge.     Past Medical History:   Diagnosis Date    Colon polyps 2017    repeat colonoscopy in 5 years    Hemorrhoid     Hypertension     Diagnosed @ 1 1/2 years ago and medication was prescribed - patient reports he did not take medication and does not want treatment for blood pressure    Internal hemorrhoid, bleeding     Rectal bleed         Past Surgical History:   Procedure Laterality Date    CHOLECYSTECTOMY      COLECTOMY, SIGMOID, Colostomy N/A 2/1/2019    Performed by [facility]ir Guidry [facility] at [facility] OR 2ND [facility]    COLONOSCOPY N/A 9/6/2017    Performed by Chapin Hernandez [facility] at [facility] ENDO    COLOSTOMY-REVERSAL N/A 6/21/2019    Performed by Samir Guidry MD at [facility] OR 2ND [facility]       Review of patient's allergies indicates:   Allergen Reactions    Penicillins Anaphylaxis and Swelling     As a child age 11       Family History   Problem Relation Age of Onset    No Known Problems Mother     Ulcerative colitis Father 57        intestines     No Known Problems Brother     Anesthesia problems Neg Hx        Social History     Socioeconomic History    Marital status:      Spouse name: Not on file    Number of children: Not on file    Years of education: Not on file    Highest education level: Not on file   Occupational History    Occupation:      Employer: [employer]    Social Needs    Financial resource strain: Somewhat hard    Food insecurity:     Worry: Sometimes true      "Inability: Never true    Transportation needs:     Medical: No     Non-medical: No   Tobacco Use    Smoking status: Current Every Day Smoker     Packs/day: 1.50     Years: 25.00     Pack years: 37.50     Types: Cigarettes    Smokeless tobacco: Current User     Types: Chew    Tobacco comment: quit from 6/21/2018 to 4/1/2019 but now smoking again 1 ppd; had quit for 5 years from 2011 to 2016   Substance and Sexual Activity    Alcohol use: Yes     Frequency: 2-4 times a month     Drinks per session: 5 or 6     Binge frequency: Less than monthly     Comment: every weekend - 4 drinks either beer or vodka per occasion    Drug use: No    Sexual activity: Yes   Lifestyle    Physical activity:     Days per week: 5 days     Minutes per session: 40 min    Stress: Only a little   Relationships    Social connections:     Talks on phone: Twice a week     Gets together: Twice a week     Attends Zoroastrianism service: Not on file     Active member of club or organization: No     Attends meetings of clubs or organizations: Never     Relationship status: Living with partner   Other Topics Concern    Not on file   Social History Narrative    Not on file       ROS:  GENERAL: No fever, chills, fatigability or weight loss.  Integument: No rashes, redness, icterus  CHEST: Denies GONZALEZ, cyanosis, wheezing, cough and sputum production.  CARDIOVASCULAR: Denies chest pain, PND, orthopnea or reduced exercise tolerance.  GI: Denies abd pain, dysphagia, nausea, vomiting, no hematemesis   : Denies burning on urination, no hematuria, no bacteriuria  MSK: No deformities, swelling, joint pain swelling  Neurologic: No HAs, seizures, weakness, paresthesias, gait problems    PE:  General appearance healthy  /79 (BP Location: Left arm, Patient Position: Sitting, BP Method: Large (Automatic))   Pulse 71   Ht 5' 10" (1.778 m)   Wt 129.4 kg (285 lb 4.4 oz)   BMI 40.93 kg/m²   Sclera/ Skin nonicteric  LN nonpalpable  AT NC EOMI  Neck " supple trachea midline   Chest symmetric, nl excursion, no retractions, breathing comfortably  Abdomen  ND soft NT.  no masses, no organomegaly. Midline incision healing properly  Small 2 cm granuloma at umbilicus.   EXT - no CCE  Neuro:  Mood/ affect nl, alert and oriented x 3, moves all ext's, gait nl      Assessment:  44 y.o. male s/p Johny procedure for perforated diverticulitis.    In 6/21 he underwent a Johny takedown without complications.   Doing well, now with incision granuloma.     Plan:  Follow up is schedule with Dr. Guidry       Midline Wound exploratrion Procedure Note:   Verbal consent obtained    Indications:  Suture granuloma     Post procedure diagnosis:  Suture granuloma    Surgeon Isaac     Findings:   Suture granuloma was removed without complications   Wound was opened and explored with q tips  Fascia was intact     Pt tolerated procedure well.      No complications.

## 2019-08-12 ENCOUNTER — OFFICE VISIT (OUTPATIENT)
Dept: SURGERY | Facility: CLINIC | Age: 44
End: 2019-08-12
Payer: COMMERCIAL

## 2019-08-12 VITALS
BODY MASS INDEX: 40.05 KG/M2 | WEIGHT: 279.75 LBS | SYSTOLIC BLOOD PRESSURE: 140 MMHG | DIASTOLIC BLOOD PRESSURE: 85 MMHG | HEIGHT: 70 IN | HEART RATE: 74 BPM

## 2019-08-12 DIAGNOSIS — K57.32 SIGMOID DIVERTICULITIS: Primary | ICD-10-CM

## 2019-08-12 PROCEDURE — 99999 PR PBB SHADOW E&M-EST. PATIENT-LVL III: ICD-10-PCS | Mod: PBBFAC,,, | Performed by: COLON & RECTAL SURGERY

## 2019-08-12 PROCEDURE — 99024 PR POST-OP FOLLOW-UP VISIT: ICD-10-PCS | Mod: S$GLB,,, | Performed by: COLON & RECTAL SURGERY

## 2019-08-12 PROCEDURE — 99999 PR PBB SHADOW E&M-EST. PATIENT-LVL III: CPT | Mod: PBBFAC,,, | Performed by: COLON & RECTAL SURGERY

## 2019-08-12 PROCEDURE — 99024 POSTOP FOLLOW-UP VISIT: CPT | Mod: S$GLB,,, | Performed by: COLON & RECTAL SURGERY

## 2019-08-14 ENCOUNTER — PATIENT MESSAGE (OUTPATIENT)
Dept: SURGERY | Facility: CLINIC | Age: 44
End: 2019-08-14

## 2019-08-15 ENCOUNTER — PATIENT MESSAGE (OUTPATIENT)
Dept: SURGERY | Facility: CLINIC | Age: 44
End: 2019-08-15

## 2019-08-15 ENCOUNTER — TELEPHONE (OUTPATIENT)
Dept: SURGERY | Facility: CLINIC | Age: 44
End: 2019-08-15

## 2019-08-15 NOTE — TELEPHONE ENCOUNTER
----- Message from Bernice Vides sent at 8/15/2019  2:34 PM CDT -----  Reason for call:Pt calling to speak with Brittany Sabillon in regards to his return to work letter.        Communication Preference:[phone]    Additional Information:

## 2019-08-19 ENCOUNTER — PATIENT MESSAGE (OUTPATIENT)
Dept: SURGERY | Facility: CLINIC | Age: 44
End: 2019-08-19

## 2019-08-20 ENCOUNTER — PATIENT MESSAGE (OUTPATIENT)
Dept: SURGERY | Facility: CLINIC | Age: 44
End: 2019-08-20

## 2019-08-27 NOTE — PROGRESS NOTES
CRS Post-operative visit    Visit Info:     Procedure:   1.  Colostomy takedown with colorectal anastomosis.  2.  Repair of parastomal hernia fascial defect.    Date of Procedure: June 21, 2019    Indication: 44-year-old male who several months ago had undergone an emergent Johny procedure for perforated diverticulitis.  He subsequently developed a parastomal hernia.    Date of Discharge: June 24, 2019    Current Status:  Overall doing well.  He is eating with no nausea or vomiting.  He is having some mild constipation but overall has been moving his bowels well. No fevers or chills.  No pain.    Pathology:   1. Colostomy (excision):  Benign colonic tissue  2. Rectum (resection):  Diverticulosis  Associated reactive changes  3. Descending colon (resection):  No neoplasm identified  Diverticulosis  4. Proximal and distal donuts (excision):  Benign colonic tissue    Physical Exam:  General: White male in NAD   Neuro: aaox4   Respiratory: resps even unlabored  Abdomen:  Soft, nondistended, appropriately tender, well-healed midline incision with staples, left lower quadrant former stoma site nearly completely healed  Extremities: Warm dry and intact      Assessment:  S/p colostomy closure and repair of parastomal hernia defect following prior Johny's procedure for perforated diverticulitis    Plan:  Okay to return to work.  Abdominal binder.  Diet as tolerated.  RTO prn  Colonoscopy 1 year postop.    Samir Guidry MD, FACS, FASCRS  Senior Staff Surgeon  Department of Colon & Rectal Surgery     This note was created using voice recognition software, and may contain some unrecognized transcriptional errors.

## 2020-11-02 ENCOUNTER — TELEPHONE (OUTPATIENT)
Dept: SURGERY | Facility: CLINIC | Age: 45
End: 2020-11-02

## 2020-11-07 ENCOUNTER — PATIENT OUTREACH (OUTPATIENT)
Dept: ADMINISTRATIVE | Facility: OTHER | Age: 45
End: 2020-11-07

## 2020-11-07 NOTE — PROGRESS NOTES
LINKS immunization registry updated  Care Everywhere updated  Health Maintenance updated  Chart reviewed for overdue Proactive [facility] Encounters (JOCELYNE) health maintenance testing (CRS, Breast Ca, Diabetic Eye Exam)   Orders entered:N/A

## 2020-11-09 ENCOUNTER — TELEPHONE (OUTPATIENT)
Dept: ENDOSCOPY | Facility: HOSPITAL | Age: 45
End: 2020-11-09

## 2020-11-09 ENCOUNTER — OFFICE VISIT (OUTPATIENT)
Dept: SURGERY | Facility: CLINIC | Age: 45
End: 2020-11-09
Payer: COMMERCIAL

## 2020-11-09 VITALS
DIASTOLIC BLOOD PRESSURE: 77 MMHG | WEIGHT: 315 LBS | HEART RATE: 87 BPM | SYSTOLIC BLOOD PRESSURE: 146 MMHG | HEIGHT: 70 IN | BODY MASS INDEX: 45.1 KG/M2

## 2020-11-09 DIAGNOSIS — K43.2 INCISIONAL HERNIA, WITHOUT OBSTRUCTION OR GANGRENE: Primary | ICD-10-CM

## 2020-11-09 DIAGNOSIS — Z01.812 PRE-PROCEDURE LAB EXAM: Primary | ICD-10-CM

## 2020-11-09 DIAGNOSIS — Z86.010 HISTORY OF COLON POLYPS: ICD-10-CM

## 2020-11-09 DIAGNOSIS — Z12.11 SPECIAL SCREENING FOR MALIGNANT NEOPLASMS, COLON: Primary | ICD-10-CM

## 2020-11-09 DIAGNOSIS — Z87.19 HISTORY OF DIVERTICULITIS: ICD-10-CM

## 2020-11-09 PROCEDURE — 99999 PR PBB SHADOW E&M-EST. PATIENT-LVL III: ICD-10-PCS | Mod: PBBFAC,,, | Performed by: COLON & RECTAL SURGERY

## 2020-11-09 PROCEDURE — 3008F PR BODY MASS INDEX (BMI) DOCUMENTED: ICD-10-PCS | Mod: CPTII,S$GLB,, | Performed by: COLON & RECTAL SURGERY

## 2020-11-09 PROCEDURE — 99999 PR PBB SHADOW E&M-EST. PATIENT-LVL III: CPT | Mod: PBBFAC,,, | Performed by: COLON & RECTAL SURGERY

## 2020-11-09 PROCEDURE — 1126F PR PAIN SEVERITY QUANTIFIED, NO PAIN PRESENT: ICD-10-PCS | Mod: S$GLB,,, | Performed by: COLON & RECTAL SURGERY

## 2020-11-09 PROCEDURE — 99213 PR OFFICE/OUTPT VISIT, EST, LEVL III, 20-29 MIN: ICD-10-PCS | Mod: S$GLB,,, | Performed by: COLON & RECTAL SURGERY

## 2020-11-09 PROCEDURE — 1126F AMNT PAIN NOTED NONE PRSNT: CPT | Mod: S$GLB,,, | Performed by: COLON & RECTAL SURGERY

## 2020-11-09 PROCEDURE — 3077F PR MOST RECENT SYSTOLIC BLOOD PRESSURE >= 140 MM HG: ICD-10-PCS | Mod: CPTII,S$GLB,, | Performed by: COLON & RECTAL SURGERY

## 2020-11-09 PROCEDURE — 99213 OFFICE O/P EST LOW 20 MIN: CPT | Mod: S$GLB,,, | Performed by: COLON & RECTAL SURGERY

## 2020-11-09 PROCEDURE — 3008F BODY MASS INDEX DOCD: CPT | Mod: CPTII,S$GLB,, | Performed by: COLON & RECTAL SURGERY

## 2020-11-09 PROCEDURE — 3078F DIAST BP <80 MM HG: CPT | Mod: CPTII,S$GLB,, | Performed by: COLON & RECTAL SURGERY

## 2020-11-09 PROCEDURE — 3077F SYST BP >= 140 MM HG: CPT | Mod: CPTII,S$GLB,, | Performed by: COLON & RECTAL SURGERY

## 2020-11-09 PROCEDURE — 3078F PR MOST RECENT DIASTOLIC BLOOD PRESSURE < 80 MM HG: ICD-10-PCS | Mod: CPTII,S$GLB,, | Performed by: COLON & RECTAL SURGERY

## 2020-11-09 RX ORDER — DOXYCYCLINE 100 MG/1
CAPSULE ORAL
COMMUNITY
Start: 2020-10-13 | End: 2021-01-20

## 2020-11-09 RX ORDER — SODIUM, POTASSIUM,MAG SULFATES 17.5-3.13G
1 SOLUTION, RECONSTITUTED, ORAL ORAL DAILY
Qty: 1 KIT | Refills: 0 | Status: SHIPPED | OUTPATIENT
Start: 2020-11-09 | End: 2020-11-11

## 2020-11-09 NOTE — PROGRESS NOTES
CRS Office Visit    SUBJECTIVE:     Chief Complaint: Hernia    History of Present Illness:  Patient is a 45 y.o. male presents with concerns of a hernia at a previous incision site.  The patient has a history of perforated diverticulitis, necessitating resection with end colostomy in 2/2019.  The patient underwent colostomy reversal in 6/2019.  Patient states he noted pain and tenderness in the distribution of the midline incision a few months ago.  At that time, he also noticed a bulge present with valsalva.  He denies any obstructive symptoms.  Reports a longstanding history of constipation.  Denies nausea, vomiting.    Review of patient's allergies indicates:   Allergen Reactions    Penicillins Anaphylaxis and Swelling     As a child age 11       Past Medical History:   Diagnosis Date    Colon polyps 2017    repeat colonoscopy in 5 years    Hemorrhoid     Hypertension     Diagnosed @ 1 1/2 years ago and medication was prescribed - patient reports he did not take medication and does not want treatment for blood pressure    Internal hemorrhoid, bleeding     Rectal bleed      Past Surgical History:   Procedure Laterality Date    CHOLECYSTECTOMY      COLONOSCOPY N/A 9/6/2017    Procedure: COLONOSCOPY;  Surgeon: Chapin Hernandez MD;  [facility];  Service: Endoscopy;  Laterality: N/A;    REVISION COLOSTOMY N/A 6/21/2019    Procedure: COLOSTOMY-REVERSAL;  Surgeon: Samir Guidry MD;  Location: [facility] OR [address] [place] [address];  Service: Colon and Rectal;  Laterality: N/A;  ERAS LOW     Family History   Problem Relation Age of Onset    No Known Problems Mother     Ulcerative colitis Father 57        intestines     No Known Problems Brother     Anesthesia problems Neg Hx      Social History     Tobacco Use    Smoking status: Current Every Day Smoker     Packs/day: 1.50     Years: 25.00     Pack years: 37.50     Types: Cigarettes    Smokeless tobacco: Current User     Types: Chew    Tobacco comment: quit from  6/21/2018 to 4/1/2019 but now smoking again 1 ppd; had quit for 5 years from 2011 to 2016   Substance Use Topics    Alcohol use: Yes     Frequency: 2-4 times a month     Drinks per session: 5 or 6     Binge frequency: Less than monthly     Comment: every weekend - 4 drinks either beer or vodka per occasion    Drug use: No        Review of Systems:  Constitutional: no fever or chills  Eyes: no visual changes  ENT: no nasal congestion or sore throat  Respiratory: no cough or shortness of breath  Cardiovascular: no chest pain or palpitations  Gastrointestinal: no nausea or vomiting, tolerating diet  Genitourinary: no hematuria or dysuria  Integument/Breast: no rash or pruritis  Hematologic/Lymphatic: no easy bruising or lymphadenopathy  Musculoskeletal: no arthralgias or myalgias  Neurological: no seizures or tremors  Behavioral/Psych: no auditory or visual hallucinations  Endocrine: no heat or cold intolerance    OBJECTIVE:     Vital Signs (Most Recent)  Pulse: 87 (11/09/20 1417)  BP: (!) 146/77 (11/09/20 1417)    Physical Exam:  General: White male in NAD sitting in chair in clinic  Neuro: aaox4 maex4 perrl  Respiratory: resps even unlabored  Cardiac: cap refill <2 sec  Abdomen: Abdomen soft, nontender. BS normal. Obese. Midline and previous colostomy incisions intact. Large hernia defect associated with midline incision present - minimally tender to palpation  Extremities: Warm dry and intact  Anorectal: deferred    ASSESSMENT/PLAN:     Kyle was seen today for hernia.    Diagnoses and all orders for this visit:    Incisional hernia, without obstruction or gangrene  -     Ambulatory referral/consult to General Surgery; Future    History of colon polyps  -     Case request GI: COLONOSCOPY    History of diverticulitis  -     Case request GI: COLONOSCOPY      Mr. Abel presents today with a ventral hernia at the site of his previous midline incision.  The hernia defect is of large enough size that it is of low risk  for incarceration.  We will plan to refer the patient to general surgery for a minimally invasive ventral hernia repair with Dr. Blair.  The patient has expressed interest in a follow up colonoscopy.  His last colonoscopy was in 2017 and demonstrated a couple polyps at that time.  We believe it is reasonable to perform a repeat colonoscopy at this time and we will plan to schedule that procedure for the near future.  Patient expressed understanding to the above. All questions were answered.    Kar Herbert MD  CRS Fellow    I have interviewed and examined the patient, reviewed the notes and assessments, and/or personally supervised the procedure(s) performed by Dr. Herbert, and I concur with her/his documentation of Kyle Abel.  See below addendum for my evaluation and additional findings.    44 yo M with hx of perforated diverticulitis requiring Johny's procedure and subsequent colostomy reversal. He now has a symptomatic incisional hernia.    Exam as noted above.    Will refer to Gen Surg (Rossy) for laparoscopic/robotic incisional hernia repair.  He has a hx of colon polyps on last colonscopy in 2017 - will schedule him for colonoscopy prior to hernia repair.    Samir Guidry MD, FACS, FASCRS  Senior Staff Surgeon  Department of Colon & Rectal Surgery

## 2020-11-09 NOTE — LETTER
December 17, 2020      Gilma Cuba NP  [address]  [address]  [place] [address]           [facility] [address]  [address]  [facility] [address]  Phone: [phone]          Patient: Kyle Abel   MR Number: [id number]   YOB: 1975   Date of Visit: 11/9/2020       Dear Gilma Cuba NP:    Thank you for referring Kyle Abel to me for evaluation. Attached you will find relevant portions of my assessment and plan of care.    If you have questions, please do not hesitate to call me. I look forward to following Kyle Abel along with you.    Sincerely,    Samir Guidry MD    Enclosure  CC:  Josemanuel Blair Jr., MD    If you would like to receive this communication electronically, please contact [email or web address] or [phone] to request more information on [employer] access.    For providers and/or their staff who would like to refer a patient to [facility], please contact us through our one-stop-shop provider referral line, [facility], at [phone].    If you feel you have received this communication in error or would no longer like to receive these types of communications, please e-mail [email or web address]          H/O ovarian cystectomy    Status post breast reduction

## 2020-11-18 ENCOUNTER — TELEPHONE (OUTPATIENT)
Dept: SURGERY | Facility: CLINIC | Age: 45
End: 2020-11-18

## 2020-11-18 ENCOUNTER — TELEPHONE (OUTPATIENT)
Dept: ENDOSCOPY | Facility: HOSPITAL | Age: 45
End: 2020-11-18

## 2020-11-18 NOTE — TELEPHONE ENCOUNTER
----- Message from Alisia Kumar sent at 11/18/2020  2:04 PM CST -----  Regarding: Cancel Appt  Contact: Patient  Patient is requesting to cancel colonoscopy scheduled for 12/01/2020   Patient stated he would like to have it completed after the 1st of the year     Patient can be reached at [phone]

## 2020-12-31 ENCOUNTER — TELEPHONE (OUTPATIENT)
Dept: SURGERY | Facility: CLINIC | Age: 45
End: 2020-12-31

## 2020-12-31 NOTE — TELEPHONE ENCOUNTER
----- Message from Lin Jesus RN sent at 12/30/2020  5:12 PM CST -----  Please schedule patient for hernia repair.    Thank you.

## 2021-01-04 ENCOUNTER — PATIENT MESSAGE (OUTPATIENT)
Dept: ADMINISTRATIVE | Facility: HOSPITAL | Age: 46
End: 2021-01-04

## 2021-01-14 ENCOUNTER — TELEPHONE (OUTPATIENT)
Dept: SURGERY | Facility: CLINIC | Age: 46
End: 2021-01-14

## 2021-01-20 ENCOUNTER — HOSPITAL ENCOUNTER (OUTPATIENT)
Dept: RADIOLOGY | Facility: HOSPITAL | Age: 46
Discharge: HOME | End: 2021-01-20
Attending: SURGERY
Payer: COMMERCIAL

## 2021-01-20 ENCOUNTER — OFFICE VISIT (OUTPATIENT)
Dept: SURGERY | Facility: CLINIC | Age: 46
End: 2021-01-20
Payer: COMMERCIAL

## 2021-01-20 VITALS
SYSTOLIC BLOOD PRESSURE: 126 MMHG | HEART RATE: 83 BPM | DIASTOLIC BLOOD PRESSURE: 82 MMHG | BODY MASS INDEX: 45.1 KG/M2 | WEIGHT: 315 LBS | HEIGHT: 70 IN

## 2021-01-20 DIAGNOSIS — F17.200 SMOKING: ICD-10-CM

## 2021-01-20 DIAGNOSIS — E66.01 MORBID OBESITY: ICD-10-CM

## 2021-01-20 DIAGNOSIS — K43.2 INCISIONAL HERNIA, WITHOUT OBSTRUCTION OR GANGRENE: Primary | ICD-10-CM

## 2021-01-20 DIAGNOSIS — K43.2 INCISIONAL HERNIA, WITHOUT OBSTRUCTION OR GANGRENE: ICD-10-CM

## 2021-01-20 PROCEDURE — 3074F PR MOST RECENT SYSTOLIC BLOOD PRESSURE < 130 MM HG: ICD-10-PCS | Mod: CPTII,S$GLB,, | Performed by: SURGERY

## 2021-01-20 PROCEDURE — 99999 PR PBB SHADOW E&M-EST. PATIENT-LVL III: ICD-10-PCS | Mod: PBBFAC,,, | Performed by: SURGERY

## 2021-01-20 PROCEDURE — 1125F PR PAIN SEVERITY QUANTIFIED, PAIN PRESENT: ICD-10-PCS | Mod: S$GLB,,, | Performed by: SURGERY

## 2021-01-20 PROCEDURE — 3079F DIAST BP 80-89 MM HG: CPT | Mod: CPTII,S$GLB,, | Performed by: SURGERY

## 2021-01-20 PROCEDURE — 74176 CT ABD & PELVIS W/O CONTRAST: CPT | Mod: 26,,, | Performed by: RADIOLOGY

## 2021-01-20 PROCEDURE — 99204 OFFICE O/P NEW MOD 45 MIN: CPT | Mod: S$GLB,,, | Performed by: SURGERY

## 2021-01-20 PROCEDURE — 3008F PR BODY MASS INDEX (BMI) DOCUMENTED: ICD-10-PCS | Mod: CPTII,S$GLB,, | Performed by: SURGERY

## 2021-01-20 PROCEDURE — 3074F SYST BP LT 130 MM HG: CPT | Mod: CPTII,S$GLB,, | Performed by: SURGERY

## 2021-01-20 PROCEDURE — 3008F BODY MASS INDEX DOCD: CPT | Mod: CPTII,S$GLB,, | Performed by: SURGERY

## 2021-01-20 PROCEDURE — 99204 PR OFFICE/OUTPT VISIT, NEW, LEVL IV, 45-59 MIN: ICD-10-PCS | Mod: S$GLB,,, | Performed by: SURGERY

## 2021-01-20 PROCEDURE — 74176 CT ABDOMEN PELVIS WITHOUT CONTRAST: ICD-10-PCS | Mod: 26,,, | Performed by: RADIOLOGY

## 2021-01-20 PROCEDURE — 99999 PR PBB SHADOW E&M-EST. PATIENT-LVL III: CPT | Mod: PBBFAC,,, | Performed by: SURGERY

## 2021-01-20 PROCEDURE — 74176 CT ABD & PELVIS W/O CONTRAST: CPT | Mod: TC

## 2021-01-20 PROCEDURE — 1125F AMNT PAIN NOTED PAIN PRSNT: CPT | Mod: S$GLB,,, | Performed by: SURGERY

## 2021-01-20 PROCEDURE — 3079F PR MOST RECENT DIASTOLIC BLOOD PRESSURE 80-89 MM HG: ICD-10-PCS | Mod: CPTII,S$GLB,, | Performed by: SURGERY

## 2021-01-20 PROCEDURE — 25500020 PHARM REV CODE 255: Performed by: SURGERY

## 2021-01-20 RX ADMIN — IOHEXOL 15 ML: 350 INJECTION, SOLUTION INTRAVENOUS at 10:01

## 2021-01-22 ENCOUNTER — TELEPHONE (OUTPATIENT)
Dept: FAMILY MEDICINE | Facility: CLINIC | Age: 46
End: 2021-01-22

## 2021-01-22 DIAGNOSIS — Z13.0 SCREENING FOR DEFICIENCY ANEMIA: ICD-10-CM

## 2021-01-22 DIAGNOSIS — Z00.00 ENCOUNTER FOR BLOOD TEST FOR ROUTINE GENERAL PHYSICAL EXAMINATION: ICD-10-CM

## 2021-01-22 DIAGNOSIS — Z11.4 SCREENING FOR HIV WITHOUT PRESENCE OF RISK FACTORS: ICD-10-CM

## 2021-01-22 DIAGNOSIS — Z11.59 ENCOUNTER FOR HEPATITIS C SCREENING TEST FOR LOW RISK PATIENT: ICD-10-CM

## 2021-01-22 DIAGNOSIS — R91.1 PULMONARY NODULE: Primary | ICD-10-CM

## 2021-01-22 DIAGNOSIS — Z13.220 SCREENING CHOLESTEROL LEVEL: ICD-10-CM

## 2021-01-22 DIAGNOSIS — Z13.29 THYROID DISORDER SCREEN: ICD-10-CM

## 2021-01-22 DIAGNOSIS — Z13.1 DIABETES MELLITUS SCREENING: ICD-10-CM

## 2021-02-10 ENCOUNTER — PATIENT OUTREACH (OUTPATIENT)
Dept: ADMINISTRATIVE | Facility: OTHER | Age: 46
End: 2021-02-10

## 2021-02-12 ENCOUNTER — OFFICE VISIT (OUTPATIENT)
Dept: SURGERY | Facility: CLINIC | Age: 46
End: 2021-02-12
Payer: COMMERCIAL

## 2021-02-12 VITALS
WEIGHT: 315 LBS | HEART RATE: 73 BPM | SYSTOLIC BLOOD PRESSURE: 151 MMHG | DIASTOLIC BLOOD PRESSURE: 87 MMHG | BODY MASS INDEX: 45.1 KG/M2 | HEIGHT: 70 IN

## 2021-02-12 DIAGNOSIS — E66.01 MORBID OBESITY: ICD-10-CM

## 2021-02-12 DIAGNOSIS — K43.2 INCISIONAL HERNIA, WITHOUT OBSTRUCTION OR GANGRENE: Primary | ICD-10-CM

## 2021-02-12 PROCEDURE — 3077F PR MOST RECENT SYSTOLIC BLOOD PRESSURE >= 140 MM HG: ICD-10-PCS | Mod: CPTII,S$GLB,, | Performed by: SURGERY

## 2021-02-12 PROCEDURE — 3079F PR MOST RECENT DIASTOLIC BLOOD PRESSURE 80-89 MM HG: ICD-10-PCS | Mod: CPTII,S$GLB,, | Performed by: SURGERY

## 2021-02-12 PROCEDURE — 99213 PR OFFICE/OUTPT VISIT, EST, LEVL III, 20-29 MIN: ICD-10-PCS | Mod: S$GLB,,, | Performed by: SURGERY

## 2021-02-12 PROCEDURE — 3079F DIAST BP 80-89 MM HG: CPT | Mod: CPTII,S$GLB,, | Performed by: SURGERY

## 2021-02-12 PROCEDURE — 99999 PR PBB SHADOW E&M-EST. PATIENT-LVL III: CPT | Mod: PBBFAC,,, | Performed by: SURGERY

## 2021-02-12 PROCEDURE — 3008F BODY MASS INDEX DOCD: CPT | Mod: CPTII,S$GLB,, | Performed by: SURGERY

## 2021-02-12 PROCEDURE — 3077F SYST BP >= 140 MM HG: CPT | Mod: CPTII,S$GLB,, | Performed by: SURGERY

## 2021-02-12 PROCEDURE — 99213 OFFICE O/P EST LOW 20 MIN: CPT | Mod: S$GLB,,, | Performed by: SURGERY

## 2021-02-12 PROCEDURE — 3008F PR BODY MASS INDEX (BMI) DOCUMENTED: ICD-10-PCS | Mod: CPTII,S$GLB,, | Performed by: SURGERY

## 2021-02-12 PROCEDURE — 99999 PR PBB SHADOW E&M-EST. PATIENT-LVL III: ICD-10-PCS | Mod: PBBFAC,,, | Performed by: SURGERY

## 2021-04-05 ENCOUNTER — PATIENT MESSAGE (OUTPATIENT)
Dept: ADMINISTRATIVE | Facility: HOSPITAL | Age: 46
End: 2021-04-05

## 2021-04-29 ENCOUNTER — PATIENT MESSAGE (OUTPATIENT)
Dept: RESEARCH | Facility: HOSPITAL | Age: 46
End: 2021-04-29

## 2021-07-07 ENCOUNTER — PATIENT MESSAGE (OUTPATIENT)
Dept: ADMINISTRATIVE | Facility: HOSPITAL | Age: 46
End: 2021-07-07

## 2021-10-05 ENCOUNTER — PATIENT MESSAGE (OUTPATIENT)
Dept: ADMINISTRATIVE | Facility: HOSPITAL | Age: 46
End: 2021-10-05

## 2022-01-10 ENCOUNTER — PATIENT MESSAGE (OUTPATIENT)
Dept: ADMINISTRATIVE | Facility: HOSPITAL | Age: 47
End: 2022-01-10

## 2022-01-29 ENCOUNTER — HOSPITAL ENCOUNTER (EMERGENCY)
Facility: HOSPITAL | Age: 47
Discharge: HOME | End: 2022-01-29
Attending: EMERGENCY MEDICINE
Payer: COMMERCIAL

## 2022-01-29 VITALS
HEIGHT: 70 IN | SYSTOLIC BLOOD PRESSURE: 129 MMHG | OXYGEN SATURATION: 99 % | HEART RATE: 80 BPM | BODY MASS INDEX: 45.1 KG/M2 | DIASTOLIC BLOOD PRESSURE: 76 MMHG | RESPIRATION RATE: 18 BRPM | TEMPERATURE: 98 F | WEIGHT: 315 LBS

## 2022-01-29 DIAGNOSIS — S82.891A CLOSED FRACTURE OF RIGHT ANKLE, INITIAL ENCOUNTER: Primary | ICD-10-CM

## 2022-01-29 DIAGNOSIS — R52 PAIN: ICD-10-CM

## 2022-01-29 PROCEDURE — 99283 EMERGENCY DEPT VISIT LOW MDM: CPT | Mod: 25

## 2022-01-29 PROCEDURE — 25000003 PHARM REV CODE 250: Performed by: EMERGENCY MEDICINE

## 2022-01-29 PROCEDURE — 29515 APPLICATION SHORT LEG SPLINT: CPT | Mod: RT

## 2022-01-29 RX ORDER — HYDROCODONE BITARTRATE AND ACETAMINOPHEN 5; 325 MG/1; MG/1
1 TABLET ORAL
Status: COMPLETED | OUTPATIENT
Start: 2022-01-29 | End: 2022-01-29

## 2022-01-29 RX ORDER — ONDANSETRON 4 MG/1
4 TABLET, FILM COATED ORAL EVERY 6 HOURS
Qty: 12 TABLET | Refills: 0 | Status: SHIPPED | OUTPATIENT
Start: 2022-01-29

## 2022-01-29 RX ORDER — ONDANSETRON 4 MG/1
4 TABLET, FILM COATED ORAL EVERY 6 HOURS
Qty: 12 TABLET | Refills: 0 | Status: SHIPPED | OUTPATIENT
Start: 2022-01-29 | End: 2022-01-29 | Stop reason: SDUPTHER

## 2022-01-29 RX ORDER — DIPHENHYDRAMINE HCL 25 MG
25 CAPSULE ORAL
Status: COMPLETED | OUTPATIENT
Start: 2022-01-29 | End: 2022-01-29

## 2022-01-29 RX ORDER — ONDANSETRON 4 MG/1
4 TABLET, ORALLY DISINTEGRATING ORAL
Status: COMPLETED | OUTPATIENT
Start: 2022-01-29 | End: 2022-01-29

## 2022-01-29 RX ORDER — OXYCODONE AND ACETAMINOPHEN 5; 325 MG/1; MG/1
1 TABLET ORAL EVERY 8 HOURS PRN
Qty: 9 TABLET | Refills: 0 | Status: SHIPPED | OUTPATIENT
Start: 2022-01-29

## 2022-01-29 RX ADMIN — HYDROCODONE BITARTRATE AND ACETAMINOPHEN 1 TABLET: 5; 325 TABLET ORAL at 08:01

## 2022-01-29 RX ADMIN — ONDANSETRON 4 MG: 4 TABLET, ORALLY DISINTEGRATING ORAL at 07:01

## 2022-01-29 RX ADMIN — HYDROCODONE BITARTRATE AND ACETAMINOPHEN 1 TABLET: 5; 325 TABLET ORAL at 07:01

## 2022-01-29 RX ADMIN — DIPHENHYDRAMINE HYDROCHLORIDE 25 MG: 25 CAPSULE ORAL at 08:01

## 2022-01-30 NOTE — DISCHARGE INSTRUCTIONS
Please read and follow discharge instructions and return precautions.  Rest, avoid any strenuous activity, over exertion or overheating.  Keep well hydrated.  Return immediately if you develop new or worsening symptoms or if you have new problems or concerns.  Elevate your ankle as much as possible.  Do not bear weight  Percocet as directed if needed for pain.  You may also take ibuprofen over-the-counter as directed if needed for pain.  MiraLax or Colace over-the-counter as directed if constipation occurs.  Important to see your orthopedic physician in the next 3 days.
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
## 2022-05-27 NOTE — PRE-PROCEDURE INSTRUCTIONS
Preop screen completed.  Preop instructions(bathing/fasting/directions/location of surgery/ and preop medication instructions reviewed with patient).  Patient instructed to hold/stop all blood thinning medications, prior to surgery, following the pre-surgery recommended guidelines.  Patient verbalized understanding.  
Home

## 2024-05-17 ENCOUNTER — OCCUPATIONAL HEALTH (OUTPATIENT)
Dept: URGENT CARE | Facility: CLINIC | Age: 49
End: 2024-05-17

## 2024-05-17 DIAGNOSIS — F12.90 MARIJUANA USE: Primary | ICD-10-CM

## 2024-05-17 LAB
CTP QC/QA: YES
POC 5 PANEL DRUG SCREEN: NEGATIVE

## 2024-05-17 PROCEDURE — 80305 DRUG TEST PRSMV DIR OPT OBS: CPT | Mod: S$GLB,,, | Performed by: FAMILY MEDICINE

## 2024-05-17 RX ORDER — MELOXICAM 7.5 MG/1
7.5 TABLET ORAL 2 TIMES DAILY
COMMUNITY

## 2024-05-17 RX ORDER — ALBUTEROL SULFATE 90 UG/1
2 AEROSOL, METERED RESPIRATORY (INHALATION) EVERY 6 HOURS PRN
COMMUNITY
Start: 2023-07-27

## 2024-05-17 NOTE — PROGRESS NOTES
Subjective:      Patient ID: Kyle Abel is a 49 y.o. male.    Chief Complaint: Drug / Alcohol Assessment    49n y/o male is here for a drug screen for personal reasons, non employment.     Drug / Alcohol Assessment  ROS  Objective:     Physical Exam   Assessment:      No diagnosis found.  Plan:                 No follow-ups on file.

## (undated) DEVICE — TRAY FOLEY 16FR INFECTION CONT

## (undated) DEVICE — SEE MEDLINE ITEM 152622

## (undated) DEVICE — DEVICE ENSEAL X1 LARGE JAW

## (undated) DEVICE — SEE L#95700

## (undated) DEVICE — SUT ETHILON 2-0 PSLX 30IN

## (undated) DEVICE — DRAPE ABDOMINAL TIBURON 14X11

## (undated) DEVICE — SUT 1 48IN PDS II VIO MONO

## (undated) DEVICE — GAUZE SPONGE 4X4 12PLY

## (undated) DEVICE — SEE MEDLINE ITEM 146347

## (undated) DEVICE — TUBING HF INSUFFLATION W/ FLTR

## (undated) DEVICE — SEE MEDLINE ITEM 146417

## (undated) DEVICE — SOL NS 1000CC

## (undated) DEVICE — CONNECTOR Y 3/8X3/8X3/8

## (undated) DEVICE — SUT MCRYL PLUS 4-0 PS2 27IN

## (undated) DEVICE — DRESSING MEPORE ADH 3.5X12

## (undated) DEVICE — NDL 22GA X1 1/2 REG BEVEL

## (undated) DEVICE — SEE MEDLINE ITEM 156902

## (undated) DEVICE — Device

## (undated) DEVICE — SUT CHROMIC 3-0 SH 27IN GUT

## (undated) DEVICE — POWDER ARISTA AH 3G

## (undated) DEVICE — CART STAPLE RELD 30X3.5 BLU

## (undated) DEVICE — SUT VICRYL PLUS 0 CT1 18IN

## (undated) DEVICE — SUT 0 VICRYL / UR6 (J603)

## (undated) DEVICE — SEE MEDLINE ITEM 157144

## (undated) DEVICE — SUT CTD VICRYL 0 VIL BR/CT

## (undated) DEVICE — SPONGE IV DRAIN 4X4 STERILE

## (undated) DEVICE — LUBRICANT SURGILUBE 2 OZ

## (undated) DEVICE — SUT VICRYL PLUS 3-0 SH 18IN

## (undated) DEVICE — SUT 2-0 NYLON D/A

## (undated) DEVICE — BINDER AB 10IN W/STAVES XLARGE

## (undated) DEVICE — SET DECANTER MEDICHOICE

## (undated) DEVICE — BELLOW CANN HEMOBLAST 1.65GR

## (undated) DEVICE — COVER LIGHT HANDLE 80/CA

## (undated) DEVICE — SUT 3-0 VICRYL SH CR/8 18

## (undated) DEVICE — LEGGINGS 48X31 INCH

## (undated) DEVICE — STAPLER INT PROX TX 30X3.5MM

## (undated) DEVICE — SUT 1 36IN PDS II VIO MONO

## (undated) DEVICE — STAPLER SKIN PROXIMATE WIDE

## (undated) DEVICE — ADHESIVE DERMABOND ADVANCED

## (undated) DEVICE — SYR 30CC LUER LOCK

## (undated) DEVICE — SEE MEDLINE ITEM 157181

## (undated) DEVICE — SEE MEDLINE ITEM 152487

## (undated) DEVICE — STAPLER INT PROX TX 60X4.8MM

## (undated) DEVICE — NDL BOX COUNTER

## (undated) DEVICE — KIT EVACUATOR FLAT DRAIN 100CC

## (undated) DEVICE — SEE MEDLINE ITEM 157117

## (undated) DEVICE — SEE MEDLINE ITEM 154981

## (undated) DEVICE — ELECTRODE REM PLYHSV RETURN 9

## (undated) DEVICE — SUT VICRYL CTD 2-0 GI 27 SH

## (undated) DEVICE — CUTTER PROXIMATE GREEN 75MM

## (undated) DEVICE — SET IRR URLGY 2LINE UNIV SPIKE

## (undated) DEVICE — SUT VICRYL 3-0 27 SH

## (undated) DEVICE — KIT ANTIFOG

## (undated) DEVICE — SUT CTD VICRYL VIL BR SH 27

## (undated) DEVICE — CART STAPLE RELD PROX 60X4.8MM